# Patient Record
Sex: FEMALE | Race: WHITE | NOT HISPANIC OR LATINO | Employment: FULL TIME | URBAN - NONMETROPOLITAN AREA
[De-identification: names, ages, dates, MRNs, and addresses within clinical notes are randomized per-mention and may not be internally consistent; named-entity substitution may affect disease eponyms.]

---

## 2020-10-18 ENCOUNTER — APPOINTMENT (EMERGENCY)
Dept: RADIOLOGY | Facility: HOSPITAL | Age: 50
End: 2020-10-18
Payer: COMMERCIAL

## 2020-10-18 ENCOUNTER — HOSPITAL ENCOUNTER (OUTPATIENT)
Facility: HOSPITAL | Age: 50
Setting detail: OBSERVATION
Discharge: HOME | End: 2020-10-19
Attending: EMERGENCY MEDICINE | Admitting: INTERNAL MEDICINE
Payer: COMMERCIAL

## 2020-10-18 DIAGNOSIS — R79.89 ELEVATED TSH: ICD-10-CM

## 2020-10-18 DIAGNOSIS — R07.9 CHEST PAIN: Primary | ICD-10-CM

## 2020-10-18 DIAGNOSIS — I16.0 HYPERTENSIVE URGENCY: ICD-10-CM

## 2020-10-18 DIAGNOSIS — R79.89 ABNORMAL SERUM THYROID STIMULATING HORMONE (TSH) LEVEL: ICD-10-CM

## 2020-10-18 PROBLEM — R07.2 PRECORDIAL CHEST PAIN: Status: ACTIVE | Noted: 2020-10-18

## 2020-10-18 LAB
ALBUMIN SERPL BCP-MCNC: 3.5 G/DL (ref 3.5–5)
ALP SERPL-CCNC: 80 U/L (ref 46–116)
ALT SERPL W P-5'-P-CCNC: 25 U/L (ref 12–78)
ANION GAP SERPL CALCULATED.3IONS-SCNC: 8 MMOL/L (ref 4–13)
AST SERPL W P-5'-P-CCNC: 21 U/L (ref 5–45)
BASOPHILS # BLD AUTO: 0.03 THOUSANDS/ΜL (ref 0–0.1)
BASOPHILS NFR BLD AUTO: 1 % (ref 0–1)
BILIRUB SERPL-MCNC: 0.42 MG/DL (ref 0.2–1)
BUN SERPL-MCNC: 10 MG/DL (ref 5–25)
CALCIUM SERPL-MCNC: 8.3 MG/DL (ref 8.3–10.1)
CHLORIDE SERPL-SCNC: 104 MMOL/L (ref 100–108)
CHOLEST SERPL-MCNC: 214 MG/DL (ref 50–200)
CK SERPL-CCNC: 104 U/L (ref 26–192)
CO2 SERPL-SCNC: 27 MMOL/L (ref 21–32)
CREAT SERPL-MCNC: 0.67 MG/DL (ref 0.6–1.3)
EOSINOPHIL # BLD AUTO: 0.2 THOUSAND/ΜL (ref 0–0.61)
EOSINOPHIL NFR BLD AUTO: 3 % (ref 0–6)
ERYTHROCYTE [DISTWIDTH] IN BLOOD BY AUTOMATED COUNT: 12.5 % (ref 11.6–15.1)
EST. AVERAGE GLUCOSE BLD GHB EST-MCNC: 114 MG/DL
GFR SERPL CREATININE-BSD FRML MDRD: 104 ML/MIN/1.73SQ M
GLUCOSE SERPL-MCNC: 104 MG/DL (ref 65–140)
HBA1C MFR BLD: 5.6 %
HCG SERPL QL: NEGATIVE
HCT VFR BLD AUTO: 40.8 % (ref 34.8–46.1)
HDLC SERPL-MCNC: 47 MG/DL
HGB BLD-MCNC: 13.6 G/DL (ref 11.5–15.4)
IMM GRANULOCYTES # BLD AUTO: 0.01 THOUSAND/UL (ref 0–0.2)
IMM GRANULOCYTES NFR BLD AUTO: 0 % (ref 0–2)
INR PPP: 0.94 (ref 0.84–1.19)
LDLC SERPL CALC-MCNC: 143 MG/DL (ref 0–100)
LIPASE SERPL-CCNC: 57 U/L (ref 73–393)
LYMPHOCYTES # BLD AUTO: 1.68 THOUSANDS/ΜL (ref 0.6–4.47)
LYMPHOCYTES NFR BLD AUTO: 28 % (ref 14–44)
MAGNESIUM SERPL-MCNC: 1.9 MG/DL (ref 1.6–2.6)
MCH RBC QN AUTO: 30.6 PG (ref 26.8–34.3)
MCHC RBC AUTO-ENTMCNC: 33.3 G/DL (ref 31.4–37.4)
MCV RBC AUTO: 92 FL (ref 82–98)
MONOCYTES # BLD AUTO: 0.37 THOUSAND/ΜL (ref 0.17–1.22)
MONOCYTES NFR BLD AUTO: 6 % (ref 4–12)
NEUTROPHILS # BLD AUTO: 3.65 THOUSANDS/ΜL (ref 1.85–7.62)
NEUTS SEG NFR BLD AUTO: 62 % (ref 43–75)
NONHDLC SERPL-MCNC: 167 MG/DL
NRBC BLD AUTO-RTO: 0 /100 WBCS
PLATELET # BLD AUTO: 290 THOUSANDS/UL (ref 149–390)
PMV BLD AUTO: 8.9 FL (ref 8.9–12.7)
POTASSIUM SERPL-SCNC: 3.5 MMOL/L (ref 3.5–5.3)
PROT SERPL-MCNC: 7.6 G/DL (ref 6.4–8.2)
PROTHROMBIN TIME: 12.4 SECONDS (ref 11.6–14.5)
RBC # BLD AUTO: 4.44 MILLION/UL (ref 3.81–5.12)
SODIUM SERPL-SCNC: 139 MMOL/L (ref 136–145)
TRIGL SERPL-MCNC: 121 MG/DL
TROPONIN I SERPL-MCNC: <0.02 NG/ML
TSH SERPL DL<=0.05 MIU/L-ACNC: 18.61 UIU/ML (ref 0.36–3.74)
WBC # BLD AUTO: 5.94 THOUSAND/UL (ref 4.31–10.16)

## 2020-10-18 PROCEDURE — 83690 ASSAY OF LIPASE: CPT | Performed by: EMERGENCY MEDICINE

## 2020-10-18 PROCEDURE — 85610 PROTHROMBIN TIME: CPT | Performed by: EMERGENCY MEDICINE

## 2020-10-18 PROCEDURE — 84484 ASSAY OF TROPONIN QUANT: CPT | Performed by: FAMILY MEDICINE

## 2020-10-18 PROCEDURE — 85025 COMPLETE CBC W/AUTO DIFF WBC: CPT | Performed by: EMERGENCY MEDICINE

## 2020-10-18 PROCEDURE — 71045 X-RAY EXAM CHEST 1 VIEW: CPT

## 2020-10-18 PROCEDURE — 83735 ASSAY OF MAGNESIUM: CPT | Performed by: EMERGENCY MEDICINE

## 2020-10-18 PROCEDURE — 36415 COLL VENOUS BLD VENIPUNCTURE: CPT | Performed by: EMERGENCY MEDICINE

## 2020-10-18 PROCEDURE — 96360 HYDRATION IV INFUSION INIT: CPT

## 2020-10-18 PROCEDURE — 80053 COMPREHEN METABOLIC PANEL: CPT | Performed by: EMERGENCY MEDICINE

## 2020-10-18 PROCEDURE — 99285 EMERGENCY DEPT VISIT HI MDM: CPT | Performed by: EMERGENCY MEDICINE

## 2020-10-18 PROCEDURE — 99220 PR INITIAL OBSERVATION CARE/DAY 70 MINUTES: CPT | Performed by: FAMILY MEDICINE

## 2020-10-18 PROCEDURE — 84703 CHORIONIC GONADOTROPIN ASSAY: CPT | Performed by: EMERGENCY MEDICINE

## 2020-10-18 PROCEDURE — 82550 ASSAY OF CK (CPK): CPT | Performed by: EMERGENCY MEDICINE

## 2020-10-18 PROCEDURE — 99285 EMERGENCY DEPT VISIT HI MDM: CPT

## 2020-10-18 PROCEDURE — 80061 LIPID PANEL: CPT | Performed by: FAMILY MEDICINE

## 2020-10-18 PROCEDURE — 94760 N-INVAS EAR/PLS OXIMETRY 1: CPT

## 2020-10-18 PROCEDURE — 84484 ASSAY OF TROPONIN QUANT: CPT | Performed by: EMERGENCY MEDICINE

## 2020-10-18 PROCEDURE — 93005 ELECTROCARDIOGRAM TRACING: CPT

## 2020-10-18 PROCEDURE — 83036 HEMOGLOBIN GLYCOSYLATED A1C: CPT | Performed by: FAMILY MEDICINE

## 2020-10-18 PROCEDURE — 84443 ASSAY THYROID STIM HORMONE: CPT | Performed by: EMERGENCY MEDICINE

## 2020-10-18 RX ORDER — ATORVASTATIN CALCIUM 40 MG/1
40 TABLET, FILM COATED ORAL EVERY EVENING
Status: DISCONTINUED | OUTPATIENT
Start: 2020-10-18 | End: 2020-10-19 | Stop reason: HOSPADM

## 2020-10-18 RX ORDER — ACETAMINOPHEN 325 MG/1
650 TABLET ORAL EVERY 6 HOURS PRN
Status: DISCONTINUED | OUTPATIENT
Start: 2020-10-18 | End: 2020-10-19 | Stop reason: HOSPADM

## 2020-10-18 RX ORDER — KETOROLAC TROMETHAMINE 30 MG/ML
15 INJECTION, SOLUTION INTRAMUSCULAR; INTRAVENOUS ONCE
Status: COMPLETED | OUTPATIENT
Start: 2020-10-18 | End: 2020-10-18

## 2020-10-18 RX ORDER — ASPIRIN 81 MG/1
324 TABLET, CHEWABLE ORAL ONCE
Status: COMPLETED | OUTPATIENT
Start: 2020-10-18 | End: 2020-10-18

## 2020-10-18 RX ORDER — DOCUSATE SODIUM 100 MG/1
100 CAPSULE, LIQUID FILLED ORAL 2 TIMES DAILY
Status: DISCONTINUED | OUTPATIENT
Start: 2020-10-18 | End: 2020-10-19 | Stop reason: HOSPADM

## 2020-10-18 RX ORDER — ONDANSETRON 2 MG/ML
4 INJECTION INTRAMUSCULAR; INTRAVENOUS EVERY 6 HOURS PRN
Status: DISCONTINUED | OUTPATIENT
Start: 2020-10-18 | End: 2020-10-19 | Stop reason: HOSPADM

## 2020-10-18 RX ORDER — SODIUM CHLORIDE 9 MG/ML
3 INJECTION INTRAVENOUS
Status: DISCONTINUED | OUTPATIENT
Start: 2020-10-18 | End: 2020-10-19 | Stop reason: HOSPADM

## 2020-10-18 RX ORDER — POLYETHYLENE GLYCOL 3350 17 G/17G
17 POWDER, FOR SOLUTION ORAL DAILY
Status: DISCONTINUED | OUTPATIENT
Start: 2020-10-18 | End: 2020-10-19 | Stop reason: HOSPADM

## 2020-10-18 RX ORDER — NITROGLYCERIN 0.4 MG/1
0.4 TABLET SUBLINGUAL
Status: DISCONTINUED | OUTPATIENT
Start: 2020-10-18 | End: 2020-10-19 | Stop reason: HOSPADM

## 2020-10-18 RX ORDER — ASPIRIN 81 MG/1
81 TABLET ORAL DAILY
Status: DISCONTINUED | OUTPATIENT
Start: 2020-10-19 | End: 2020-10-19 | Stop reason: HOSPADM

## 2020-10-18 RX ORDER — LISINOPRIL 5 MG/1
5 TABLET ORAL DAILY
Status: DISCONTINUED | OUTPATIENT
Start: 2020-10-18 | End: 2020-10-19 | Stop reason: HOSPADM

## 2020-10-18 RX ORDER — MAGNESIUM HYDROXIDE/ALUMINUM HYDROXICE/SIMETHICONE 120; 1200; 1200 MG/30ML; MG/30ML; MG/30ML
30 SUSPENSION ORAL EVERY 6 HOURS PRN
Status: DISCONTINUED | OUTPATIENT
Start: 2020-10-18 | End: 2020-10-19 | Stop reason: HOSPADM

## 2020-10-18 RX ORDER — NITROGLYCERIN 0.4 MG/1
0.4 TABLET SUBLINGUAL ONCE
Status: COMPLETED | OUTPATIENT
Start: 2020-10-18 | End: 2020-10-18

## 2020-10-18 RX ADMIN — ASPIRIN 81 MG 324 MG: 81 TABLET ORAL at 09:28

## 2020-10-18 RX ADMIN — SODIUM CHLORIDE 500 ML: 0.9 INJECTION, SOLUTION INTRAVENOUS at 09:32

## 2020-10-18 RX ADMIN — NITROGLYCERIN 0.4 MG: 0.4 TABLET SUBLINGUAL at 09:37

## 2020-10-18 RX ADMIN — ATORVASTATIN CALCIUM 40 MG: 40 TABLET, FILM COATED ORAL at 17:05

## 2020-10-18 RX ADMIN — KETOROLAC TROMETHAMINE 15 MG: 30 INJECTION, SOLUTION INTRAMUSCULAR at 22:16

## 2020-10-18 RX ADMIN — ENOXAPARIN SODIUM 40 MG: 40 INJECTION SUBCUTANEOUS at 12:20

## 2020-10-18 RX ADMIN — DOCUSATE SODIUM 100 MG: 100 CAPSULE, LIQUID FILLED ORAL at 17:05

## 2020-10-18 RX ADMIN — Medication 400 MG: at 17:05

## 2020-10-18 RX ADMIN — NITROGLYCERIN 0.4 MG: 0.4 TABLET SUBLINGUAL at 09:29

## 2020-10-18 RX ADMIN — Medication 400 MG: at 13:38

## 2020-10-18 RX ADMIN — LISINOPRIL 5 MG: 10 TABLET ORAL at 12:18

## 2020-10-19 ENCOUNTER — APPOINTMENT (OUTPATIENT)
Dept: NUCLEAR MEDICINE | Facility: HOSPITAL | Age: 50
End: 2020-10-19
Payer: COMMERCIAL

## 2020-10-19 ENCOUNTER — APPOINTMENT (OUTPATIENT)
Dept: NON INVASIVE DIAGNOSTICS | Facility: HOSPITAL | Age: 50
End: 2020-10-19
Payer: COMMERCIAL

## 2020-10-19 VITALS
WEIGHT: 191.8 LBS | DIASTOLIC BLOOD PRESSURE: 77 MMHG | OXYGEN SATURATION: 97 % | BODY MASS INDEX: 32.74 KG/M2 | TEMPERATURE: 98.1 F | HEART RATE: 62 BPM | SYSTOLIC BLOOD PRESSURE: 120 MMHG | RESPIRATION RATE: 17 BRPM | HEIGHT: 64 IN

## 2020-10-19 LAB
ATRIAL RATE: 61 BPM
ATRIAL RATE: 69 BPM
P AXIS: 55 DEGREES
P AXIS: 59 DEGREES
PR INTERVAL: 168 MS
PR INTERVAL: 178 MS
QRS AXIS: -6 DEGREES
QRS AXIS: 12 DEGREES
QRSD INTERVAL: 76 MS
QRSD INTERVAL: 82 MS
QT INTERVAL: 428 MS
QT INTERVAL: 440 MS
QTC INTERVAL: 442 MS
QTC INTERVAL: 458 MS
T WAVE AXIS: 12 DEGREES
T WAVE AXIS: 17 DEGREES
T4 FREE SERPL-MCNC: 0.59 NG/DL (ref 0.76–1.46)
TSH SERPL DL<=0.05 MIU/L-ACNC: 21.91 UIU/ML (ref 0.36–3.74)
VENTRICULAR RATE: 61 BPM
VENTRICULAR RATE: 69 BPM

## 2020-10-19 PROCEDURE — G1004 CDSM NDSC: HCPCS

## 2020-10-19 PROCEDURE — 99217 PR OBSERVATION CARE DISCHARGE MANAGEMENT: CPT | Performed by: INTERNAL MEDICINE

## 2020-10-19 PROCEDURE — 84443 ASSAY THYROID STIM HORMONE: CPT | Performed by: FAMILY MEDICINE

## 2020-10-19 PROCEDURE — 93017 CV STRESS TEST TRACING ONLY: CPT

## 2020-10-19 PROCEDURE — 78452 HT MUSCLE IMAGE SPECT MULT: CPT

## 2020-10-19 PROCEDURE — A9502 TC99M TETROFOSMIN: HCPCS

## 2020-10-19 PROCEDURE — 84439 ASSAY OF FREE THYROXINE: CPT | Performed by: FAMILY MEDICINE

## 2020-10-19 RX ORDER — LISINOPRIL 5 MG/1
5 TABLET ORAL DAILY
Qty: 30 TABLET | Refills: 0 | Status: SHIPPED | OUTPATIENT
Start: 2020-10-20 | End: 2021-10-22

## 2020-10-19 RX ORDER — LEVOTHYROXINE SODIUM 0.05 MG/1
50 TABLET ORAL DAILY
Qty: 30 TABLET | Refills: 0 | Status: SHIPPED | OUTPATIENT
Start: 2020-10-19

## 2020-10-19 RX ADMIN — Medication 400 MG: at 09:01

## 2020-10-19 RX ADMIN — POLYETHYLENE GLYCOL 3350 17 G: 17 POWDER, FOR SOLUTION ORAL at 09:03

## 2020-10-19 RX ADMIN — REGADENOSON 0.4 MG: 0.08 INJECTION, SOLUTION INTRAVENOUS at 12:31

## 2020-10-19 RX ADMIN — LISINOPRIL 5 MG: 10 TABLET ORAL at 09:01

## 2020-10-19 RX ADMIN — DOCUSATE SODIUM 100 MG: 100 CAPSULE, LIQUID FILLED ORAL at 09:01

## 2020-10-19 RX ADMIN — ENOXAPARIN SODIUM 40 MG: 40 INJECTION SUBCUTANEOUS at 09:00

## 2020-10-19 RX ADMIN — ASPIRIN 81 MG: 81 TABLET, COATED ORAL at 09:01

## 2020-10-20 LAB
ARRHY DURING EX: NORMAL
CHEST PAIN STATEMENT: NORMAL
MAX DIASTOLIC BP: 110 MMHG
MAX HEART RATE: 125 BPM
MAX PREDICTED HEART RATE: 171 BPM
MAX. SYSTOLIC BP: 197 MMHG
PROTOCOL NAME: NORMAL
REASON FOR TERMINATION: NORMAL
TARGET HR FORMULA: NORMAL
TEST INDICATION: NORMAL
TIME IN EXERCISE PHASE: NORMAL

## 2021-10-22 ENCOUNTER — APPOINTMENT (EMERGENCY)
Dept: RADIOLOGY | Facility: HOSPITAL | Age: 51
End: 2021-10-22
Payer: COMMERCIAL

## 2021-10-22 ENCOUNTER — HOSPITAL ENCOUNTER (EMERGENCY)
Facility: HOSPITAL | Age: 51
Discharge: HOME | End: 2021-10-22
Attending: EMERGENCY MEDICINE | Admitting: EMERGENCY MEDICINE
Payer: COMMERCIAL

## 2021-10-22 VITALS
WEIGHT: 177 LBS | OXYGEN SATURATION: 100 % | HEIGHT: 64 IN | SYSTOLIC BLOOD PRESSURE: 163 MMHG | BODY MASS INDEX: 30.22 KG/M2 | RESPIRATION RATE: 17 BRPM | DIASTOLIC BLOOD PRESSURE: 79 MMHG | HEART RATE: 59 BPM | TEMPERATURE: 98.5 F

## 2021-10-22 DIAGNOSIS — R07.9 CHEST PAIN: Primary | ICD-10-CM

## 2021-10-22 LAB
ANION GAP SERPL CALCULATED.3IONS-SCNC: 7 MMOL/L (ref 4–13)
BASOPHILS # BLD AUTO: 0.04 THOUSANDS/ΜL (ref 0–0.1)
BASOPHILS NFR BLD AUTO: 1 % (ref 0–1)
BUN SERPL-MCNC: 19 MG/DL (ref 5–25)
CALCIUM SERPL-MCNC: 8.9 MG/DL (ref 8.3–10.1)
CHLORIDE SERPL-SCNC: 103 MMOL/L (ref 100–108)
CO2 SERPL-SCNC: 29 MMOL/L (ref 21–32)
CREAT SERPL-MCNC: 0.71 MG/DL (ref 0.6–1.3)
D DIMER PPP FEU-MCNC: 0.38 UG/ML FEU
EOSINOPHIL # BLD AUTO: 0.22 THOUSAND/ΜL (ref 0–0.61)
EOSINOPHIL NFR BLD AUTO: 3 % (ref 0–6)
ERYTHROCYTE [DISTWIDTH] IN BLOOD BY AUTOMATED COUNT: 12.2 % (ref 11.6–15.1)
GFR SERPL CREATININE-BSD FRML MDRD: 100 ML/MIN/1.73SQ M
GLUCOSE SERPL-MCNC: 96 MG/DL (ref 65–140)
HCT VFR BLD AUTO: 39.6 % (ref 34.8–46.1)
HGB BLD-MCNC: 13.7 G/DL (ref 11.5–15.4)
IMM GRANULOCYTES # BLD AUTO: 0.02 THOUSAND/UL (ref 0–0.2)
IMM GRANULOCYTES NFR BLD AUTO: 0 % (ref 0–2)
LYMPHOCYTES # BLD AUTO: 1.94 THOUSANDS/ΜL (ref 0.6–4.47)
LYMPHOCYTES NFR BLD AUTO: 28 % (ref 14–44)
MCH RBC QN AUTO: 31.5 PG (ref 26.8–34.3)
MCHC RBC AUTO-ENTMCNC: 34.6 G/DL (ref 31.4–37.4)
MCV RBC AUTO: 91 FL (ref 82–98)
MONOCYTES # BLD AUTO: 0.53 THOUSAND/ΜL (ref 0.17–1.22)
MONOCYTES NFR BLD AUTO: 8 % (ref 4–12)
NEUTROPHILS # BLD AUTO: 4.21 THOUSANDS/ΜL (ref 1.85–7.62)
NEUTS SEG NFR BLD AUTO: 60 % (ref 43–75)
NRBC BLD AUTO-RTO: 0 /100 WBCS
NT-PROBNP SERPL-MCNC: 303 PG/ML
PLATELET # BLD AUTO: 290 THOUSANDS/UL (ref 149–390)
PMV BLD AUTO: 9.2 FL (ref 8.9–12.7)
POTASSIUM SERPL-SCNC: 3.9 MMOL/L (ref 3.5–5.3)
RBC # BLD AUTO: 4.35 MILLION/UL (ref 3.81–5.12)
SODIUM SERPL-SCNC: 139 MMOL/L (ref 136–145)
TROPONIN I SERPL-MCNC: <0.02 NG/ML
TROPONIN I SERPL-MCNC: <0.02 NG/ML
WBC # BLD AUTO: 6.96 THOUSAND/UL (ref 4.31–10.16)

## 2021-10-22 PROCEDURE — 99285 EMERGENCY DEPT VISIT HI MDM: CPT

## 2021-10-22 PROCEDURE — 99285 EMERGENCY DEPT VISIT HI MDM: CPT | Performed by: EMERGENCY MEDICINE

## 2021-10-22 PROCEDURE — 93005 ELECTROCARDIOGRAM TRACING: CPT

## 2021-10-22 PROCEDURE — 36415 COLL VENOUS BLD VENIPUNCTURE: CPT | Performed by: EMERGENCY MEDICINE

## 2021-10-22 PROCEDURE — 85379 FIBRIN DEGRADATION QUANT: CPT | Performed by: EMERGENCY MEDICINE

## 2021-10-22 PROCEDURE — 84484 ASSAY OF TROPONIN QUANT: CPT | Performed by: EMERGENCY MEDICINE

## 2021-10-22 PROCEDURE — 85025 COMPLETE CBC W/AUTO DIFF WBC: CPT | Performed by: EMERGENCY MEDICINE

## 2021-10-22 PROCEDURE — 71046 X-RAY EXAM CHEST 2 VIEWS: CPT

## 2021-10-22 PROCEDURE — 80048 BASIC METABOLIC PNL TOTAL CA: CPT | Performed by: EMERGENCY MEDICINE

## 2021-10-22 PROCEDURE — 83880 ASSAY OF NATRIURETIC PEPTIDE: CPT | Performed by: EMERGENCY MEDICINE

## 2021-10-23 LAB
ATRIAL RATE: 70 BPM
P AXIS: 68 DEGREES
PR INTERVAL: 164 MS
QRS AXIS: -12 DEGREES
QRSD INTERVAL: 88 MS
QT INTERVAL: 432 MS
QTC INTERVAL: 466 MS
T WAVE AXIS: 52 DEGREES
VENTRICULAR RATE: 70 BPM

## 2021-12-14 ENCOUNTER — APPOINTMENT (EMERGENCY)
Dept: RADIOLOGY | Facility: HOSPITAL | Age: 51
End: 2021-12-14
Payer: COMMERCIAL

## 2021-12-14 ENCOUNTER — HOSPITAL ENCOUNTER (EMERGENCY)
Facility: HOSPITAL | Age: 51
Discharge: HOME | End: 2021-12-14
Attending: STUDENT IN AN ORGANIZED HEALTH CARE EDUCATION/TRAINING PROGRAM
Payer: COMMERCIAL

## 2021-12-14 VITALS
WEIGHT: 180 LBS | OXYGEN SATURATION: 99 % | HEART RATE: 77 BPM | RESPIRATION RATE: 18 BRPM | HEIGHT: 64 IN | BODY MASS INDEX: 30.73 KG/M2 | SYSTOLIC BLOOD PRESSURE: 205 MMHG | DIASTOLIC BLOOD PRESSURE: 99 MMHG | TEMPERATURE: 98.8 F

## 2021-12-14 DIAGNOSIS — S92.412A: Primary | ICD-10-CM

## 2021-12-14 PROCEDURE — 73630 X-RAY EXAM OF FOOT: CPT

## 2021-12-14 PROCEDURE — 99284 EMERGENCY DEPT VISIT MOD MDM: CPT | Performed by: STUDENT IN AN ORGANIZED HEALTH CARE EDUCATION/TRAINING PROGRAM

## 2021-12-14 PROCEDURE — 99283 EMERGENCY DEPT VISIT LOW MDM: CPT

## 2021-12-14 RX ORDER — OXYCODONE HYDROCHLORIDE 5 MG/1
5 TABLET ORAL EVERY 6 HOURS PRN
Qty: 6 TABLET | Refills: 0 | Status: SHIPPED | OUTPATIENT
Start: 2021-12-14

## 2021-12-14 RX ORDER — IBUPROFEN 600 MG/1
600 TABLET ORAL ONCE
Status: COMPLETED | OUTPATIENT
Start: 2021-12-14 | End: 2021-12-14

## 2021-12-14 RX ADMIN — IBUPROFEN 600 MG: 600 TABLET ORAL at 02:08

## 2022-06-23 DIAGNOSIS — M77.8 OTHER ENTHESOPATHIES, NOT ELSEWHERE CLASSIFIED: ICD-10-CM

## 2022-06-23 DIAGNOSIS — M67.879 OTHER SPECIFIED DISORDERS OF SYNOVIUM AND TENDON, UNSPECIFIED ANKLE AND FOOT: ICD-10-CM

## 2022-06-23 DIAGNOSIS — M21.40 FLAT FOOT (PES PLANUS) (ACQUIRED), UNSPECIFIED FOOT: ICD-10-CM

## 2022-10-28 ENCOUNTER — APPOINTMENT (EMERGENCY)
Dept: RADIOLOGY | Facility: HOSPITAL | Age: 52
End: 2022-10-28
Payer: COMMERCIAL

## 2022-10-28 ENCOUNTER — HOSPITAL ENCOUNTER (EMERGENCY)
Facility: HOSPITAL | Age: 52
Discharge: HOME | End: 2022-10-28
Attending: EMERGENCY MEDICINE
Payer: COMMERCIAL

## 2022-10-28 VITALS
RESPIRATION RATE: 18 BRPM | TEMPERATURE: 97.6 F | WEIGHT: 180 LBS | DIASTOLIC BLOOD PRESSURE: 120 MMHG | HEIGHT: 64 IN | OXYGEN SATURATION: 98 % | SYSTOLIC BLOOD PRESSURE: 174 MMHG | HEART RATE: 82 BPM | BODY MASS INDEX: 30.73 KG/M2

## 2022-10-28 DIAGNOSIS — R68.89 FLU-LIKE SYMPTOMS: Primary | ICD-10-CM

## 2022-10-28 DIAGNOSIS — J02.9 PHARYNGITIS: ICD-10-CM

## 2022-10-28 LAB
FLUAV RNA RESP QL NAA+PROBE: NEGATIVE
FLUBV RNA RESP QL NAA+PROBE: NEGATIVE
RSV RNA RESP QL NAA+PROBE: NEGATIVE
S PYO DNA THROAT QL NAA+PROBE: NOT DETECTED
SARS-COV-2 RNA RESP QL NAA+PROBE: NEGATIVE

## 2022-10-28 PROCEDURE — 0241U HB NFCT DS VIR RESP RNA 4 TRGT: CPT | Performed by: PHYSICIAN ASSISTANT

## 2022-10-28 PROCEDURE — 71045 X-RAY EXAM CHEST 1 VIEW: CPT

## 2022-10-28 PROCEDURE — 87651 STREP A DNA AMP PROBE: CPT | Performed by: PHYSICIAN ASSISTANT

## 2022-10-28 RX ORDER — KETOROLAC TROMETHAMINE 30 MG/ML
15 INJECTION, SOLUTION INTRAMUSCULAR; INTRAVENOUS ONCE
Status: COMPLETED | OUTPATIENT
Start: 2022-10-28 | End: 2022-10-28

## 2022-10-28 RX ADMIN — KETOROLAC TROMETHAMINE 15 MG: 30 INJECTION, SOLUTION INTRAMUSCULAR at 13:47

## 2022-10-28 NOTE — ED PROVIDER NOTES
History  Chief Complaint   Patient presents with   • Flu Symptoms     Pt reports sore throat with pain with swallowing and body aches x 3 days  Pt now reporting headache  Pt last took aleve 9pm last night  61-year-old female presents the emergency department for evaluation of flu-like symptoms onset yesterday 3 days ago  Patient reports she has a nonproductive cough, sore throat, body aches  Reports chills low-grade fever subjectively last night which resolved with Tylenol  Patient admits to mild headache  She denies any nausea or vomiting  Denies abdominal pain  Denies chest pain, shortness of breath on insulin  History provided by:  Patient  Flu Symptoms  Presenting symptoms: cough, fatigue, fever, headache, myalgias and sore throat    Presenting symptoms: no diarrhea, no nausea, no rhinorrhea, no shortness of breath and no vomiting    Cough:     Cough characteristics:  Non-productive  Fatigue:     Severity:  Mild  Fever:     Temp source:  Subjective    Progression:  Resolved  Headaches:     Severity:  Mild    Onset quality:  Gradual    Timing:  Constant    Progression:  Unchanged    Chronicity:  New  Severity:  Mild  Onset quality:  Gradual  Chronicity:  New  Associated symptoms: chills    Associated symptoms: no decreased appetite, no decrease in physical activity, no ear pain, no mental status change, no congestion, no neck stiffness and no syncope        Prior to Admission Medications   Prescriptions Last Dose Informant Patient Reported?  Taking?   levothyroxine 50 mcg tablet   No No   Sig: Take 1 tablet (50 mcg total) by mouth daily   Patient taking differently: Take 75 mcg by mouth daily    oxyCODONE (Roxicodone) 5 immediate release tablet   No No   Sig: Take 1 tablet (5 mg total) by mouth every 6 (six) hours as needed for moderate pain or severe pain Max Daily Amount: 20 mg      Facility-Administered Medications: None       Past Medical History:   Diagnosis Date   • Coronary artery disease • Disease of thyroid gland    • Hypertension    • MI (myocardial infarction) [facility])        Past Surgical History:   Procedure Laterality Date   • HYSTERECTOMY         History reviewed  No pertinent family history  I have reviewed and agree with the history as documented  E-Cigarette/Vaping   • E-Cigarette Use Never User      E-Cigarette/Vaping Substances     Social History     Tobacco Use   • Smoking status: Never Smoker   • Smokeless tobacco: Never Used   Vaping Use   • Vaping Use: Never used   Substance Use Topics   • Alcohol use: Never   • Drug use: Never       Review of Systems   Constitutional: Positive for chills, fatigue and fever  Negative for appetite change, decreased appetite and diaphoresis  HENT: Positive for sore throat  Negative for congestion, ear pain and rhinorrhea  Respiratory: Positive for cough  Negative for choking, chest tightness, shortness of breath, wheezing and stridor  Cardiovascular: Negative for chest pain, palpitations and leg swelling  Gastrointestinal: Negative  Negative for diarrhea, nausea and vomiting  Musculoskeletal: Positive for myalgias  Negative for back pain, gait problem, joint swelling, neck pain and neck stiffness  Skin: Negative  Neurological: Positive for headaches  Negative for dizziness, tremors, syncope, facial asymmetry, speech difficulty, weakness and light-headedness  All other systems reviewed and are negative  Physical Exam  Physical Exam  Vitals and nursing note reviewed  Constitutional:       General: She is not in acute distress  Appearance: Normal appearance  She is normal weight  She is not ill-appearing, toxic-appearing or diaphoretic  HENT:      Head: Normocephalic  Right Ear: Tympanic membrane, ear canal and external ear normal       Left Ear: Tympanic membrane, ear canal and external ear normal       Nose: Nose normal       Mouth/Throat:      Mouth: Mucous membranes are moist       Pharynx: Oropharynx is clear  Posterior oropharyngeal erythema present  Eyes:      Conjunctiva/sclera: Conjunctivae normal       Pupils: Pupils are equal, round, and reactive to light  Cardiovascular:      Rate and Rhythm: Normal rate and regular rhythm  Pulmonary:      Effort: Pulmonary effort is normal  No respiratory distress  Breath sounds: Normal breath sounds  No stridor  No wheezing, rhonchi or rales  Chest:      Chest wall: No tenderness  Abdominal:      General: Abdomen is flat  Bowel sounds are normal  There is no distension  Palpations: Abdomen is soft  Tenderness: There is no abdominal tenderness  There is no guarding  Musculoskeletal:         General: Normal range of motion  Cervical back: Normal range of motion  No tenderness  Lymphadenopathy:      Cervical: No cervical adenopathy  Skin:     General: Skin is warm and dry  Capillary Refill: Capillary refill takes less than 2 seconds  Findings: No bruising, erythema or rash  Neurological:      General: No focal deficit present  Mental Status: She is alert and oriented to person, place, and time     Psychiatric:         Mood and Affect: Mood normal          Behavior: Behavior normal          Vital Signs  ED Triage Vitals [10/28/22 1255]   Temperature Pulse Respirations Blood Pressure SpO2   97 6 °F (36 4 °C) 82 18 (!) 174/120 98 %      Temp src Heart Rate Source Patient Position - Orthostatic VS BP Location FiO2 (%)   -- -- -- -- --      Pain Score       6           Vitals:    10/28/22 1255   BP: (!) 174/120   Pulse: 82         Visual Acuity      ED Medications  Medications   ketorolac (TORADOL) injection 15 mg (15 mg Intramuscular Given 10/28/22 1347)       Diagnostic Studies  Results Reviewed     Procedure Component Value Units Date/Time    FLU/RSV/COVID - if FLU/RSV clinically relevant [[id number]]  (Normal) Collected: 10/28/22 1347    Lab Status: Final result Specimen: Nares from Nasopharyngeal Swab Updated: 10/28/22 7637 SARS-CoV-2 Negative     INFLUENZA A PCR Negative     INFLUENZA B PCR Negative     RSV PCR Negative    Narrative:      FOR PEDIATRIC PATIENTS - copy/paste COVID Guidelines URL to browser: [email or web address] [email or web address]  ashx    SARS-CoV-2 assay is a Nucleic Acid Amplification assay intended for the  qualitative detection of nucleic acid from SARS-CoV-2 in nasopharyngeal  swabs  Results are for the presumptive identification of SARS-CoV-2 RNA  Positive results are indicative of infection with SARS-CoV-2, the virus  causing COVID-19, but do not rule out bacterial infection or co-infection  with other viruses  Laboratories within the [place] and its  territories are required to report all positive results to the appropriate  public health authorities  Negative results do not preclude SARS-CoV-2  infection and should not be used as the sole basis for treatment or other  patient management decisions  Negative results must be combined with  clinical observations, patient history, and epidemiological information  This test has not been FDA cleared or approved  This test has been authorized by FDA under an Emergency Use Authorization  (EUA)  This test is only authorized for the duration of time the  declaration that circumstances exist justifying the authorization of the  emergency use of an in vitro diagnostic tests for detection of SARS-CoV-2  virus and/or diagnosis of COVID-19 infection under section 564(b)(1) of  the Act, 21 U  S C  811DOS-6(W)(7), unless the authorization is terminated  or revoked sooner  The test has been validated but independent review by FDA  and CLIA is pending  Test performed using [employer] Gene[employer]pert: This RT-PCR assay targets N2,  a region unique to SARS-CoV-2  A conserved region in the E-gene was chosen  for pan-Sarbecovirus detection which includes SARS-CoV-2      According to CMS-2020-01-R, this platform meets the definition of high-throughput technology  Strep A PCR [[id number]]  (Normal) Collected: 10/28/22 1347    Lab Status: Final result Specimen: Throat Updated: 10/28/22 1418     STREP A PCR Not Detected                 XR chest 1 view portable   ED Interpretation by Antonietta Prader, MD (10/28 1423)   NAD                 Procedures  Procedures         ED Course  ED Course as of 10/28/22 1458   Fri Oct 28, 2022   1420 STREP A PCR: Not Detected                               SBIRT 22yo+    Flowsheet Row Most Recent Value   SBIRT (25 yo +)    In order to provide better care to our patients, we are screening all of our patients for alcohol and drug use  Would it be okay to ask you these screening questions? Yes Filed at: 10/28/2022 1349   Initial Alcohol Screen: US AUDIT-C     1  How often do you have a drink containing alcohol? 0 Filed at: 10/28/2022 1349   2  How many drinks containing alcohol do you have on a typical day you are drinking? 0 Filed at: 10/28/2022 1349   3a  Male UNDER 65: How often do you have five or more drinks on one occasion? 0 Filed at: 10/28/2022 1349   3b  FEMALE Any Age, or MALE 65+: How often do you have 4 or more drinks on one occassion? 0 Filed at: 10/28/2022 1349   Audit-C Score 0 Filed at: 10/28/2022 1349   BILLY: How many times in the past year have you    Used an illegal drug or used a prescription medication for non-medical reasons? Never Filed at: 10/28/2022 1349                    MDM  Number of Diagnoses or Management Options  Flu-like symptoms: new and requires workup  Pharyngitis: new and requires workup  Diagnosis management comments: 47year old female presenting emergency department for evaluation of flu-like symptoms onset 3 days ago  Vitals and medical records reviewed  Patient made aware of elevated blood pressure will follow up with PCP  On exam she had some mild erythema in posterior oropharynx, no tonsillar hypertrophy or exudate  Heart regular rate and rhythm  Lungs clear  Strep negative    ED interpretation chest x-ray negative for acute cardiopulmonary findings  COVID, flu, RSV are pending and I educated patient on symptomatic care, will comment results  These results are negative and patient was made aware  She was clinically and hemodynamically stable for discharge       Amount and/or Complexity of Data Reviewed  Clinical lab tests: ordered and reviewed  Tests in the radiology section of CPT®: ordered and reviewed  Review and summarize past medical records: yes  Independent visualization of images, tracings, or specimens: yes        Disposition  Final diagnoses:   Flu-like symptoms   Pharyngitis     Time reflects when diagnosis was documented in both MDM as applicable and the Disposition within this note     Time User Action Codes Description Comment    10/28/2022  2:08 PM Jorge Dress [R68 89] Flu-like symptoms     10/28/2022  2:21 PM Rosaammon Ernandezjose miguel Add [J02 9] Pharyngitis       ED Disposition     ED Disposition   Discharge    Condition   Stable    Date/Time   Fri Oct 28, 2022  2:08 PM    Comment   Jodi Leila discharge to home/self care  Follow-up Information     Follow up With Specialties Details Why Contact Info    [address], [place] [address] [facility]  [phone]            Discharge Medication List as of 10/28/2022  2:21 PM      CONTINUE these medications which have NOT CHANGED    Details   levothyroxine 50 mcg tablet Take 1 tablet (50 mcg total) by mouth daily, Starting Mon 10/19/2020, Normal      oxyCODONE (Roxicodone) 5 immediate release tablet Take 1 tablet (5 mg total) by mouth every 6 (six) hours as needed for moderate pain or severe pain Max Daily Amount: 20 mg, Starting Tue 12/14/2021, Normal             No discharge procedures on file      PDMP Review       Value Time User    PDMP Reviewed  Yes 10/18/2020 11:24 AM Jordana Rush MD          ED Provider  Electronically Signed by           John Wilson PA-C  10/28/22 7912

## 2022-10-28 NOTE — DISCHARGE INSTRUCTIONS
Please alternate between Tylenol and ibuprofen as needed for body aches and chills  Please get plenty fluids, soft foods and rest  We will call you with the results of your COVID, flu RSV  Please follow-up with your family doctor, your blood pressure was high today and should be re-evaluated    Return with any new or worsening symptoms

## 2023-02-23 ENCOUNTER — OFFICE VISIT (OUTPATIENT)
Dept: URGENT CARE | Facility: CLINIC | Age: 53
End: 2023-02-23

## 2023-02-23 VITALS
HEART RATE: 64 BPM | WEIGHT: 181 LBS | BODY MASS INDEX: 30.9 KG/M2 | TEMPERATURE: 97 F | DIASTOLIC BLOOD PRESSURE: 90 MMHG | OXYGEN SATURATION: 99 % | SYSTOLIC BLOOD PRESSURE: 184 MMHG | RESPIRATION RATE: 18 BRPM | HEIGHT: 64 IN

## 2023-02-23 DIAGNOSIS — R68.89 FLU-LIKE SYMPTOMS: Primary | ICD-10-CM

## 2023-02-23 DIAGNOSIS — F41.9 ANXIETY AND DEPRESSION: ICD-10-CM

## 2023-02-23 DIAGNOSIS — L20.89 OTHER ATOPIC DERMATITIS: ICD-10-CM

## 2023-02-23 DIAGNOSIS — F32.A ANXIETY AND DEPRESSION: ICD-10-CM

## 2023-02-23 RX ORDER — BETAMETHASONE DIPROPIONATE 0.5 MG/ML
LOTION, AUGMENTED TOPICAL 2 TIMES DAILY
Qty: 30 ML | Refills: 0 | Status: SHIPPED | OUTPATIENT
Start: 2023-02-23

## 2023-02-23 NOTE — PROGRESS NOTES
[address] [facility]        NAME: Cassandra Childs is a 47 y o  female  : 1970    MRN: [id number]  DATE: 2023  TIME: 9:43 AM    Assessment and Plan   Flu-like symptoms [R68 89]  1  Flu-like symptoms        2  Anxiety and depression  Ambulatory Referral to Psychiatry      3  Other atopic dermatitis  betamethasone, augmented, (DIPROLENE) 0 05 % lotion        Reinforced the importance of calling 911, suicide hotline, or going to the emergency room she begins having suicidal homicidal ideation  Advised patient to try to follow-up with current PCP or attempt new office  And referral for practice  Patient Instructions   Drink plenty of fluids  May use over the counter cold medications for symptomatic treatment  Do not use medications with Pseudoephedrine or Phenylphrine if you have high blood pressure because it may worsen your blood pressure  Follow up with your PCP in 3-5 days if your symptoms do not improve or if you have any concerns  Go to the ER if symptoms become severe  Follow up with PCP in 3-5 days  Proceed to  ER if symptoms worsen  Chief Complaint     Chief Complaint   Patient presents with   • Cold Like Symptoms     Symptoms started Monday, chills, sinus congestion, fever, fatigue, sore throat, headache, both ears ache, productive cough with dark green and yellow mucus that is getting lighter  Tested for Covid  morning  History of Present Illness       Patient is a 60-year-old female with significant past medical history of hypertension, hypothyroidism, MI, coronary artery disease, and side attempt presents the office complaining of fever 101 2, chills, fatigue, congestion, rhinorrhea, sore throat, cough, earache, nausea, and vomiting for 4 days  Denies chest pain, shortness of breath, or abdominal pain  At home COVID test negative  States she is been having on and off sickness for few months    Reports significant difficulty getting into her PCP for follow-up  Reports she also has eczema on her hands which she has been unable to get rid of with over-the-counter medications  Of note, patient had positive depression screening  Admits she has suicidal thoughts few weeks ago due to finding out that she will likely lose her medical insurance  States she was able to talk to a coworker friend who made her feel better  Denies any active suicidal homicidal thoughts today  States she would not hurt herself because she has 2 grandchildren that she loves very much  Reports she has various abnormal labs according to her PCP including B12, lipids, and vitamin D deficiency but her PCP would not make an appointment for follow-up for 3 months  Review of Systems   Review of Systems   Constitutional: Positive for chills, fatigue and fever (101 )  HENT: Positive for congestion, ear pain and sore throat  Respiratory: Positive for cough  Negative for shortness of breath  Cardiovascular: Negative for chest pain and palpitations  Gastrointestinal: Positive for nausea and vomiting  Negative for abdominal pain and diarrhea  Genitourinary: Positive for flank pain  Negative for dysuria, frequency, hematuria and urgency  Musculoskeletal: Positive for myalgias  Skin: Negative for rash  Neurological: Positive for headaches  Negative for dizziness and light-headedness           Current Medications       Current Outpatient Medications:   •  betamethasone, augmented, (DIPROLENE) 0 05 % lotion, Apply topically 2 (two) times a day, Disp: 30 mL, Rfl: 0  •  levothyroxine 50 mcg tablet, Take 1 tablet (50 mcg total) by mouth daily (Patient taking differently: Take 75 mcg by mouth daily), Disp: 30 tablet, Rfl: 0  •  oxyCODONE (Roxicodone) 5 immediate release tablet, Take 1 tablet (5 mg total) by mouth every 6 (six) hours as needed for moderate pain or severe pain Max Daily Amount: 20 mg (Patient not taking: Reported on 2/23/2023), Disp: 6 tablet, Rfl: 0    Current Allergies     Allergies as of 02/23/2023 - Reviewed 02/23/2023   Allergen Reaction Noted   • Acetaminophen Other (See Comments) 10/22/2021            The following portions of the patient's history were reviewed and updated as appropriate: allergies, current medications, past family history, past medical history, past social history, past surgical history and problem list      Past Medical History:   Diagnosis Date   • Coronary artery disease    • Disease of thyroid gland    • Hypertension    • MI (myocardial infarction) ([facility] [address] )    • Suicide ([facility] [address] )     Attempt to OD on tylenol in 2021       Past Surgical History:   Procedure Laterality Date   • HYSTERECTOMY         History reviewed  No pertinent family history  Medications have been verified  Objective   BP (!) 184/90   Pulse 64   Temp (!) 97 °F (36 1 °C)   Resp 18   Ht 5' 4" (1 626 m)   Wt 82 1 kg (181 lb)   SpO2 99%   BMI 31 07 kg/m²   No LMP recorded  Patient has had a hysterectomy  Physical Exam     Physical Exam  Vitals and nursing note reviewed  Constitutional:       Appearance: Normal appearance  She is well-developed  Comments: Tearful on exam   HENT:      Head: Normocephalic and atraumatic  Right Ear: Tympanic membrane, ear canal and external ear normal       Left Ear: Tympanic membrane, ear canal and external ear normal       Nose: Congestion and rhinorrhea present  Mouth/Throat:      Pharynx: Uvula midline  Eyes:      General: Lids are normal       Conjunctiva/sclera: Conjunctivae normal       Pupils: Pupils are equal, round, and reactive to light  Cardiovascular:      Rate and Rhythm: Normal rate and regular rhythm  Pulses: Normal pulses  Heart sounds: Normal heart sounds  No murmur heard  No friction rub  No gallop  Pulmonary:      Effort: Pulmonary effort is normal       Breath sounds: Normal breath sounds  No wheezing, rhonchi or rales     Abdominal:      General: Bowel sounds are normal  Palpations: Abdomen is soft  Tenderness: There is no abdominal tenderness  Musculoskeletal:         General: Normal range of motion  Cervical back: Neck supple  Lymphadenopathy:      Cervical: No cervical adenopathy  Skin:     General: Skin is warm and dry  Capillary Refill: Capillary refill takes less than 2 seconds  Comments: Eczematic rash on bilateral hands and wrists   Neurological:      Mental Status: She is alert

## 2023-02-23 NOTE — LETTER
February 23, 2023     Patient: Jodi Dee   YOB: 1970   Date of Visit: 2/23/2023       To Whom It May Concern: It is my medical opinion that David Staff should remain out of work until fever free and symptoms improve           Sincerely,        Sheryle Chambers, PA-C

## 2023-02-23 NOTE — LETTER
February 23, 2023     Patient: Jodi Dee   YOB: 1970   Date of Visit: 2/23/2023       To Whom It May Concern: It is my medical opinion that Yamel Vanegas should remain out of work until 2/26/2023             Sincerely,        Adiel Henderson PA-C

## 2023-02-27 ENCOUNTER — TELEPHONE (OUTPATIENT)
Dept: URGENT CARE | Facility: CLINIC | Age: 53
End: 2023-02-27

## 2023-02-27 DIAGNOSIS — L30.9 DERMATITIS: Primary | ICD-10-CM

## 2023-02-27 RX ORDER — TRIAMCINOLONE ACETONIDE 1 MG/G
CREAM TOPICAL 2 TIMES DAILY
Qty: 30 G | Refills: 0 | Status: SHIPPED | OUTPATIENT
Start: 2023-02-27

## 2023-02-27 NOTE — TELEPHONE ENCOUNTER
Patient called stating that she needs a prior authorization for betamethasone prescription  Prior Auth not performed in the urgent care  We will replace betamethasone with triamcinolone

## 2023-03-02 ENCOUNTER — HOSPITAL ENCOUNTER (EMERGENCY)
Facility: HOSPITAL | Age: 53
Discharge: HOME | End: 2023-03-02
Attending: EMERGENCY MEDICINE

## 2023-03-02 VITALS
HEART RATE: 73 BPM | OXYGEN SATURATION: 100 % | WEIGHT: 189.6 LBS | DIASTOLIC BLOOD PRESSURE: 108 MMHG | RESPIRATION RATE: 18 BRPM | HEIGHT: 64 IN | BODY MASS INDEX: 32.37 KG/M2 | TEMPERATURE: 97.6 F | SYSTOLIC BLOOD PRESSURE: 218 MMHG

## 2023-03-02 DIAGNOSIS — L30.9 ECZEMA, UNSPECIFIED TYPE: Primary | ICD-10-CM

## 2023-03-02 RX ORDER — CLOBETASOL PROPIONATE 0.5 MG/G
OINTMENT TOPICAL 2 TIMES DAILY
Qty: 30 G | Refills: 0 | Status: SHIPPED | OUTPATIENT
Start: 2023-03-02

## 2023-03-02 NOTE — Clinical Note
Stefanie Lomas was seen and treated in our emergency department on 3/2/2023  Diagnosis:     Khushbu Lao  may return to work on return date  She may return on this date: 03/06/2023    Please call the ED with any questions you may have  [phone]       If you have any questions or concerns, please don't hesitate to call        Valdemar Aguilar MD    ______________________________           _______________          _______________  Hospital Representative                              Date                                Time

## 2023-03-03 NOTE — ED PROVIDER NOTES
History  Chief Complaint   Patient presents with   • Rash     Pt c/o worsening rash to right hand w/swelling and pain shooting up to shoulder over past 2-3 wks  Pt applying cortisone cream w/o relief  Pt unable to get appt with pcp due to office cancelled  Denies fevers     2 weeks of itchy painful rash to right hand  No rash elsewhere  No fevers  No other complaints  History provided by:  Patient   used: No    Rash  Location: Right hand  Quality: dryness, itchiness, painful, peeling, redness and scaling    Pain details:     Severity:  Moderate    Onset quality:  Gradual    Duration:  2 weeks    Timing:  Constant    Progression:  Unchanged  Severity:  Severe  Onset quality:  Gradual  Duration:  2 weeks  Timing:  Constant  Progression:  Unchanged  Chronicity:  New  Relieved by:  Nothing  Worsened by:  Nothing  Ineffective treatments:  Topical steroids (Has been using hydrocortisone without improvement)  Associated symptoms: no abdominal pain, no diarrhea, no fatigue, no fever, no headaches, no joint pain, no myalgias, no nausea, no shortness of breath, no sore throat, not vomiting and not wheezing        Prior to Admission Medications   Prescriptions Last Dose Informant Patient Reported? Taking?    betamethasone, augmented, (DIPROLENE) 0 05 % lotion   No No   Sig: Apply topically 2 (two) times a day   levothyroxine 50 mcg tablet   No No   Sig: Take 1 tablet (50 mcg total) by mouth daily   Patient taking differently: Take 75 mcg by mouth daily   oxyCODONE (Roxicodone) 5 immediate release tablet   No No   Sig: Take 1 tablet (5 mg total) by mouth every 6 (six) hours as needed for moderate pain or severe pain Max Daily Amount: 20 mg   Patient not taking: Reported on 2/23/2023   triamcinolone (KENALOG) 0 1 % cream   No No   Sig: Apply topically 2 (two) times a day      Facility-Administered Medications: None       Past Medical History:   Diagnosis Date   • Coronary artery disease    • Disease of thyroid gland    • Hypertension    • [facility] infarction) [facility] [address] [facility]    • [facility]     Attempt to OD on tylenol in 2021       Past Surgical History:   Procedure Laterality Date   • HYSTERECTOMY         History reviewed  No pertinent family history  I have reviewed and agree with the history as documented  E-Cigarette/Vaping   • E-Cigarette Use Never User      E-Cigarette/Vaping Substances     Social History     Tobacco Use   • Smoking status: Never   • Smokeless tobacco: Never   Vaping Use   • Vaping Use: Never used   Substance Use Topics   • Alcohol use: Never   • Drug use: Never       Review of Systems   Constitutional: Negative for chills, fatigue and fever  HENT: Negative for ear pain, hearing loss, sore throat, trouble swallowing and voice change  Eyes: Negative for pain and discharge  Respiratory: Negative for cough, shortness of breath and wheezing  Cardiovascular: Negative for chest pain and palpitations  Gastrointestinal: Negative for abdominal pain, blood in stool, constipation, diarrhea, nausea and vomiting  Genitourinary: Negative for dysuria, flank pain, frequency and hematuria  Musculoskeletal: Negative for arthralgias, joint swelling, myalgias, neck pain and neck stiffness  Skin: Positive for rash  Negative for wound  Neurological: Negative for dizziness, seizures, syncope, facial asymmetry and headaches  Psychiatric/Behavioral: Negative for hallucinations, self-injury and suicidal ideas  All other systems reviewed and are negative  Physical Exam  Physical Exam  Vitals and nursing note reviewed  Constitutional:       General: She is not in acute distress  Appearance: She is well-developed  She is not ill-appearing or diaphoretic  HENT:      Head: Normocephalic and atraumatic  Right Ear: External ear normal       Left Ear: External ear normal    Eyes:      General: No scleral icterus  Right eye: No discharge  Left eye: No discharge  Extraocular Movements: Extraocular movements intact  Conjunctiva/sclera: Conjunctivae normal    Pulmonary:      Effort: Pulmonary effort is normal  No respiratory distress  Musculoskeletal:         General: No deformity or signs of injury  Normal range of motion  Cervical back: Normal range of motion and neck supple  Skin:     General: Skin is warm and dry  Coloration: Skin is not jaundiced or pale  Comments: Please refer to included photographs  There is a rash of the right hand involving several of the digits, the dorsal aspect of the hand, and the palmar aspect of the hand  This is a dry rough scaly rash which causes itching to the patient  There is no drainage or discharge  There is no redness or warmth  Neurological:      General: No focal deficit present  Mental Status: She is alert and oriented to person, place, and time  Cranial Nerves: No cranial nerve deficit  Coordination: Coordination normal       Gait: Gait normal    Psychiatric:         Mood and Affect: Mood normal          Behavior: Behavior normal          Thought Content:  Thought content normal          Judgment: Judgment normal                          Vital Signs  ED Triage Vitals [03/02/23 1846]   Temperature Pulse Respirations Blood Pressure SpO2   97 6 °F (36 4 °C) 73 18 (!) 218/108 100 %      Temp Source Heart Rate Source Patient Position - Orthostatic VS BP Location FiO2 (%)   Temporal Monitor Sitting Left arm --      Pain Score       8           Vitals:    03/02/23 1846   BP: (!) 218/108   Pulse: 73   Patient Position - Orthostatic VS: Sitting         Visual Acuity      ED Medications  Medications - No data to display    Diagnostic Studies  Results Reviewed     None                 No orders to display              Procedures  Procedures         ED Course  ED Course as of 03/02/23 2134   Thu Mar 02, 2023   2005 Discussed with on-call dermatology, recommend 0 05% clobetasol ointment twice daily mixed with mupirocin ointment  They will arrange follow-up with patient  Medical Decision Making  Patient presents with 2 weeks of rash to the right hand not improving with topical steroids  Based on the history provided, the differential diagnosis includes but is not limited to dermatitis, eczema, autoimmune disorder, infection  Based on the work-up performed in the emergency department which includes direct physical examination and consultation with dermatology, it is felt that this is likely an eczematous process  Patient is prescribed topical steroid creams and will follow-up on an outpatient basis with the dermatology office  Eczema, unspecified type: acute illness or injury  Amount and/or Complexity of Data Reviewed  Labs:      Details: Not indicated  Radiology:      Details: Not indicated  Discussion of management or test interpretation with external provider(s): On-call dermatology, Dr Meet Rodas drug management  Disposition  Final diagnoses:   Eczema, unspecified type     Time reflects when diagnosis was documented in both MDM as applicable and the Disposition within this note     Time User Action Codes Description Comment    3/2/2023  8:06 PM Prosper Handing Add [L30 9] Eczema, unspecified type       ED Disposition     ED Disposition   Discharge    Condition   Stable    Date/Time   u Mar 2, 2023  8:05 PM    Comment   Jodi Dee discharge to home/self care                 Follow-up Information     Follow up With Specialties Details Why Contact Info    [address], [place] [address] [address] [place]  [phone]            Discharge Medication List as of 3/2/2023  8:07 PM      START taking these medications    Details   clobetasol (TEMOVATE) 0 05 % ointment Apply topically 2 (two) times a day, Starting Thu 3/2/2023, Normal      mupirocin (BACTROBAN) 2 % ointment Apply topically 3 (three) times a day, Starting Thu 3/2/2023, Normal         CONTINUE these medications which have NOT CHANGED    Details   betamethasone, augmented, (DIPROLENE) 0 05 % lotion Apply topically 2 (two) times a day, Starting Thu 2/23/2023, Normal      levothyroxine 50 mcg tablet Take 1 tablet (50 mcg total) by mouth daily, Starting Mon 10/19/2020, Normal      oxyCODONE (Roxicodone) 5 immediate release tablet Take 1 tablet (5 mg total) by mouth every 6 (six) hours as needed for moderate pain or severe pain Max Daily Amount: 20 mg, Starting Tue 12/14/2021, Normal      triamcinolone (KENALOG) 0 1 % cream Apply topically 2 (two) times a day, Starting Mon 2/27/2023, Normal             No discharge procedures on file      PDMP Review       Value Time User    PDMP Reviewed  Yes 10/18/2020 11:24 AM Leartis Collet, MD          ED Provider  Electronically Signed by           Bc Kent MD  03/02/23 1888

## 2023-03-07 ENCOUNTER — TELEPHONE (OUTPATIENT)
Dept: PSYCHIATRY | Facility: CLINIC | Age: 53
End: 2023-03-07

## 2023-03-21 ENCOUNTER — HOSPITAL ENCOUNTER (EMERGENCY)
Facility: HOSPITAL | Age: 53
Discharge: HOME | End: 2023-03-21
Attending: STUDENT IN AN ORGANIZED HEALTH CARE EDUCATION/TRAINING PROGRAM | Admitting: STUDENT IN AN ORGANIZED HEALTH CARE EDUCATION/TRAINING PROGRAM

## 2023-03-21 ENCOUNTER — APPOINTMENT (EMERGENCY)
Dept: RADIOLOGY | Facility: HOSPITAL | Age: 53
End: 2023-03-21

## 2023-03-21 VITALS
BODY MASS INDEX: 32.05 KG/M2 | DIASTOLIC BLOOD PRESSURE: 88 MMHG | SYSTOLIC BLOOD PRESSURE: 152 MMHG | WEIGHT: 186.73 LBS | RESPIRATION RATE: 18 BRPM | TEMPERATURE: 97.5 F | HEART RATE: 60 BPM | OXYGEN SATURATION: 98 %

## 2023-03-21 DIAGNOSIS — R07.89 ATYPICAL CHEST PAIN: Primary | ICD-10-CM

## 2023-03-21 LAB
2HR DELTA HS TROPONIN: 1 NG/L
ANION GAP SERPL CALCULATED.3IONS-SCNC: 8 MMOL/L (ref 4–13)
BASOPHILS # BLD AUTO: 0.06 THOUSANDS/ÂΜL (ref 0–0.1)
BASOPHILS NFR BLD AUTO: 1 % (ref 0–1)
BUN SERPL-MCNC: 19 MG/DL (ref 5–25)
CALCIUM SERPL-MCNC: 9.5 MG/DL (ref 8.4–10.2)
CARDIAC TROPONIN I PNL SERPL HS: 4 NG/L
CARDIAC TROPONIN I PNL SERPL HS: 5 NG/L
CHLORIDE SERPL-SCNC: 103 MMOL/L (ref 96–108)
CO2 SERPL-SCNC: 26 MMOL/L (ref 21–32)
CREAT SERPL-MCNC: 0.68 MG/DL (ref 0.6–1.3)
EOSINOPHIL # BLD AUTO: 0.4 THOUSAND/ÂΜL (ref 0–0.61)
EOSINOPHIL NFR BLD AUTO: 7 % (ref 0–6)
ERYTHROCYTE [DISTWIDTH] IN BLOOD BY AUTOMATED COUNT: 12.4 % (ref 11.6–15.1)
GFR SERPL CREATININE-BSD FRML MDRD: 101 ML/MIN/1.73SQ M
GLUCOSE SERPL-MCNC: 95 MG/DL (ref 65–140)
HCT VFR BLD AUTO: 41.3 % (ref 34.8–46.1)
HGB BLD-MCNC: 13.6 G/DL (ref 11.5–15.4)
IMM GRANULOCYTES # BLD AUTO: 0.02 THOUSAND/UL (ref 0–0.2)
IMM GRANULOCYTES NFR BLD AUTO: 0 % (ref 0–2)
LYMPHOCYTES # BLD AUTO: 2.02 THOUSANDS/ÂΜL (ref 0.6–4.47)
LYMPHOCYTES NFR BLD AUTO: 37 % (ref 14–44)
MAGNESIUM SERPL-MCNC: 1.9 MG/DL (ref 1.9–2.7)
MCH RBC QN AUTO: 29.9 PG (ref 26.8–34.3)
MCHC RBC AUTO-ENTMCNC: 32.9 G/DL (ref 31.4–37.4)
MCV RBC AUTO: 91 FL (ref 82–98)
MONOCYTES # BLD AUTO: 0.5 THOUSAND/ÂΜL (ref 0.17–1.22)
MONOCYTES NFR BLD AUTO: 9 % (ref 4–12)
NEUTROPHILS # BLD AUTO: 2.51 THOUSANDS/ÂΜL (ref 1.85–7.62)
NEUTS SEG NFR BLD AUTO: 46 % (ref 43–75)
NRBC BLD AUTO-RTO: 0 /100 WBCS
PLATELET # BLD AUTO: 296 THOUSANDS/UL (ref 149–390)
PMV BLD AUTO: 9.1 FL (ref 8.9–12.7)
POTASSIUM SERPL-SCNC: 4.3 MMOL/L (ref 3.5–5.3)
RBC # BLD AUTO: 4.55 MILLION/UL (ref 3.81–5.12)
SODIUM SERPL-SCNC: 137 MMOL/L (ref 135–147)
WBC # BLD AUTO: 5.51 THOUSAND/UL (ref 4.31–10.16)

## 2023-03-21 RX ORDER — KETOROLAC TROMETHAMINE 30 MG/ML
15 INJECTION, SOLUTION INTRAMUSCULAR; INTRAVENOUS ONCE
Status: COMPLETED | OUTPATIENT
Start: 2023-03-21 | End: 2023-03-21

## 2023-03-21 RX ADMIN — KETOROLAC TROMETHAMINE 15 MG: 30 INJECTION, SOLUTION INTRAMUSCULAR at 16:15

## 2023-03-21 RX ADMIN — SODIUM CHLORIDE 1000 ML: 0.9 INJECTION, SOLUTION INTRAVENOUS at 14:59

## 2023-03-21 NOTE — ED PROVIDER NOTES
History  Chief Complaint   Patient presents with   • Chest Pain     Patient c/o intermittent chest pain since Saturday with numbness going down left arm  History provided by:  Patient  Chest Pain  Pain location:  Substernal area  Pain quality: dull and pressure    Pain radiates to:  Does not radiate  Pain radiates to the back: no    Pain severity:  Moderate  Onset quality:  Gradual  Duration:  4 days  Timing:  Intermittent  Progression:  Waxing and waning  Chronicity:  New  Relieved by:  None tried  Worsened by:  Nothing tried  Ineffective treatments:  None tried  Associated symptoms: anxiety and dizziness    Associated symptoms: no abdominal pain, no altered mental status, no back pain, no cough, no diaphoresis, no fatigue, no fever, no headache, no heartburn, no nausea, no near-syncope, no palpitations, no shortness of breath, no syncope, not vomiting and no weakness    Risk factors: hypertension    Risk factors: no smoking       47year old F  Presents with chest pain  Intermittent over the last 4 days  No alleviating or aggravating factors  Hasn't taken anything for pain  Expresses mild shortness of breath  Hx of asthma  Had mild wheezing yesterday--improved with inhaler  Felt a little lightheaded while at work today  Eating/drinking well  Does not feel like previous GERD/acid reflux  S/p stress test in 2020 which was negative  Prior to Admission Medications   Prescriptions Last Dose Informant Patient Reported? Taking?    betamethasone, augmented, (DIPROLENE) 0 05 % lotion   No No   Sig: Apply topically 2 (two) times a day   clobetasol (TEMOVATE) 0 05 % ointment   No No   Sig: Apply topically 2 (two) times a day   levothyroxine 50 mcg tablet   No No   Sig: Take 1 tablet (50 mcg total) by mouth daily   Patient taking differently: Take 75 mcg by mouth daily   mupirocin (BACTROBAN) 2 % ointment   No No   Sig: Apply topically 3 (three) times a day   triamcinolone (KENALOG) 0 1 % cream   No No   Sig: Apply topically 2 (two) times a day      Facility-Administered Medications: None     Past Medical History:   Diagnosis Date   • Coronary artery disease    • Disease of thyroid gland    • Hypertension    • MI ([facility]ocardial infarction) ([facility] [address] )    • [facility]     Attempt to OD on tylenol in 2021       Past Surgical History:   Procedure Laterality Date   • HYSTERECTOMY         History reviewed  No pertinent family history  I have reviewed and agree with the history as documented  E-Cigarette/Vaping   • E-Cigarette Use Never User      E-Cigarette/Vaping Substances     Social History     Tobacco Use   • Smoking status: Never   • Smokeless tobacco: Never   Vaping Use   • Vaping Use: Never used   Substance Use Topics   • Alcohol use: Never   • Drug use: Never     Review of Systems   Constitutional: Negative for activity change, diaphoresis, fatigue and fever  HENT: Negative for congestion, rhinorrhea, sinus pressure and sinus pain  Eyes: Negative for photophobia, pain, redness and visual disturbance  Respiratory: Positive for chest tightness  Negative for cough, shortness of breath and wheezing  Cardiovascular: Positive for chest pain  Negative for palpitations, syncope and near-syncope  Gastrointestinal: Negative for abdominal distention, abdominal pain, constipation, diarrhea, heartburn, nausea and vomiting  Genitourinary: Negative for decreased urine volume, difficulty urinating, dysuria, flank pain, frequency, pelvic pain and urgency  Musculoskeletal: Negative for arthralgias, back pain, myalgias, neck pain and neck stiffness  Skin: Negative for color change, pallor, rash and wound  Neurological: Positive for dizziness and light-headedness  Negative for syncope, speech difficulty, weakness and headaches  Hematological: Does not bruise/bleed easily  Psychiatric/Behavioral: Negative for confusion and sleep disturbance  The patient is nervous/anxious      All other systems reviewed and are negative  Physical Exam  Physical Exam  Vitals and nursing note reviewed  Constitutional:       General: She is not in acute distress  Appearance: She is not ill-appearing or toxic-appearing  HENT:      Head: Normocephalic and atraumatic  Right Ear: External ear normal       Left Ear: External ear normal       Nose: No congestion or rhinorrhea  Mouth/Throat:      Mouth: Mucous membranes are moist       Pharynx: Oropharynx is clear  No oropharyngeal exudate or posterior oropharyngeal erythema  Eyes:      General:         Right eye: No discharge  Left eye: No discharge  Extraocular Movements: Extraocular movements intact  Conjunctiva/sclera: Conjunctivae normal       Pupils: Pupils are equal, round, and reactive to light  Cardiovascular:      Rate and Rhythm: Normal rate and regular rhythm  Pulses: Normal pulses  Heart sounds: Normal heart sounds  No murmur heard  Pulmonary:      Effort: Pulmonary effort is normal  No tachypnea, accessory muscle usage or respiratory distress  Breath sounds: Normal breath sounds  No stridor  No decreased breath sounds, wheezing, rhonchi or rales  Chest:      Chest wall: No tenderness  Abdominal:      General: Abdomen is flat  Bowel sounds are normal  There is no distension  Palpations: Abdomen is soft  There is no mass  Tenderness: There is no abdominal tenderness  There is no right CVA tenderness, left CVA tenderness, guarding or rebound  Hernia: No hernia is present  Musculoskeletal:         General: No swelling, tenderness, deformity or signs of injury  Cervical back: Normal range of motion and neck supple  No tenderness  Right lower leg: No tenderness  No edema  Left lower leg: No tenderness  No edema  Skin:     General: Skin is warm and dry  Capillary Refill: Capillary refill takes less than 2 seconds  Coloration: Skin is not cyanotic, jaundiced or pale        Findings: No bruising, ecchymosis, erythema or rash  Nails: There is no clubbing  Neurological:      General: No focal deficit present  Mental Status: She is alert and oriented to person, place, and time  Cranial Nerves: No cranial nerve deficit  Sensory: No sensory deficit  Motor: No weakness  Psychiatric:         Mood and Affect: Mood is anxious  Behavior: Behavior normal  Behavior is not agitated  Thought Content:  Thought content normal          Judgment: Judgment normal      Vital Signs  ED Triage Vitals   Temperature Pulse Respirations Blood Pressure SpO2   03/21/23 1421 03/21/23 1421 03/21/23 1421 03/21/23 1421 03/21/23 1421   97 5 °F (36 4 °C) 70 18 (!) 203/98 100 %      Temp Source Heart Rate Source Patient Position - Orthostatic VS BP Location FiO2 (%)   03/21/23 1421 03/21/23 1421 03/21/23 1421 03/21/23 1421 --   Temporal Monitor Lying Left arm       Pain Score       03/21/23 1615       7           Vitals:    03/21/23 1530 03/21/23 1630 03/21/23 1715 03/21/23 1745   BP: (!) 177/81 166/85 156/78 152/88   Pulse: 61 59 58 60   Patient Position - Orthostatic VS:  Lying  Lying     ED Medications  Medications   sodium chloride 0 9 % bolus 1,000 mL (0 mL Intravenous Stopped 3/21/23 1643)   ketorolac (TORADOL) injection 15 mg (15 mg Intravenous Given 3/21/23 1615)     Diagnostic Studies  Results Reviewed     Procedure Component Value Units Date/Time    HS Troponin I 2hr [[id number]]  (Normal) Collected: 03/21/23 1643    Lab Status: Final result Specimen: Blood from Arm, Left Updated: 03/21/23 1714     hs TnI 2hr 5 ng/L      Delta 2hr hsTnI 1 ng/L     HS Troponin I 4hr [[id number]]     Lab Status: No result Specimen: Blood     HS Troponin 0hr (reflex protocol) [[id number]]  (Normal) Collected: 03/21/23 1458    Lab Status: Final result Specimen: Blood from Line, Venous Updated: 03/21/23 1528     hs TnI 0hr 4 ng/L     Basic metabolic panel [[id number]] Collected: 03/21/23 1458    Lab Status: Final result Specimen: Blood from Line, Venous Updated: 03/21/23 1520     Sodium 137 mmol/L      Potassium 4 3 mmol/L      Chloride 103 mmol/L      CO2 26 mmol/L      ANION GAP 8 mmol/L      BUN 19 mg/dL      Creatinine 0 68 mg/dL      Glucose 95 mg/dL      Calcium 9 5 mg/dL      eGFR 101 ml/min/1 73sq m     Narrative:      [place] guidelines for Chronic Kidney Disease (CKD):   •  Stage 1 with normal or high GFR (GFR > 90 mL/min/1 73 square meters)  •  Stage 2 Mild CKD (GFR = 60-89 mL/min/1 73 square meters)  •  Stage 3A Moderate CKD (GFR = 45-59 mL/min/1 73 square meters)  •  Stage 3B Moderate CKD (GFR = 30-44 mL/min/1 73 square meters)  •  Stage 4 Severe CKD (GFR = 15-29 mL/min/1 73 square meters)  •  Stage 5 End Stage CKD (GFR <15 mL/min/1 73 square meters)  Note: GFR calculation is accurate only with a steady state creatinine    Magnesium [[id number]]  (Normal) Collected: 03/21/23 1458    Lab Status: Final result Specimen: Blood from Line, Venous Updated: 03/21/23 1520     Magnesium 1 9 mg/dL     CBC and differential [[id number]]  (Abnormal) Collected: 03/21/23 1458    Lab Status: Final result Specimen: Blood from Line, Venous Updated: 03/21/23 1505     WBC 5 51 Thousand/uL      RBC 4 55 Million/uL      Hemoglobin 13 6 g/dL      Hematocrit 41 3 %      MCV 91 fL      MCH 29 9 pg      MCHC 32 9 g/dL      RDW 12 4 %      MPV 9 1 fL      Platelets 977 Thousands/uL      nRBC 0 /100 WBCs      Neutrophils Relative 46 %      Immat GRANS % 0 %      Lymphocytes Relative 37 %      Monocytes Relative 9 %      Eosinophils Relative 7 %      Basophils Relative 1 %      Neutrophils Absolute 2 51 Thousands/µL      Immature Grans Absolute 0 02 Thousand/uL      Lymphocytes Absolute 2 02 Thousands/µL      Monocytes Absolute 0 50 Thousand/µL      Eosinophils Absolute 0 40 Thousand/µL      Basophils Absolute 0 06 Thousands/µL              XR chest 1 view portable   ED Interpretation by Myranda Aguirre DO Yani (03/21 1532)   No acute cardiopulmonary disease noted on chest x-ray             Procedures  ECG 12 Lead Documentation Only    Date/Time: 3/21/2023 2:21 PM  Performed by: Tessy Miller DO  Authorized by: Tessy Miller DO     Indications / Diagnosis:  Chest discomfort  ECG reviewed by me, the ED Provider: yes    Patient location:  ED  Previous ECG:     Previous ECG:  Unavailable  Interpretation:     Interpretation: normal    Rate:     ECG rate:  69    ECG rate assessment: normal    Rhythm:     Rhythm: sinus rhythm    Ectopy:     Ectopy: none    QRS:     QRS axis:  Normal    QRS intervals:  Normal  Conduction:     Conduction: normal    ST segments:     ST segments:  Normal  T waves:     T waves: inverted      Inverted:  III      ED Course  ED Course as of 03/21/23 1828   Tue Mar 21, 2023   1507 No leukocytosis  Hemoglobin is within normal limits  1521 BP improving without medication    1531 Troponin negative  ECG interpretation noted  1532 Chest x-ray without acute findings  1275 Oumou Hart troponin negative  Blood pressure continues to improve  Medical Decision Making  The differential diagnoses include but are not limited to ACS, PNA, hypertensive urgency   46 yo F  Hx of HTN  Presents with intermittent chest discomfort x 4 days  Vital signs stable throughout course of treatment  ECG w/o acute ischemic changes  Troponin x2 negative  Low concern for ACS  CXR without acute findings  BP improved without medication  PCP follow up encouraged  Return precautions discussed  Stable for discharge  Atypical chest pain: acute illness or injury  Amount and/or Complexity of Data Reviewed  External Data Reviewed: labs and notes  Labs: ordered  Decision-making details documented in ED Course  Radiology: ordered and independent interpretation performed  Decision-making details documented in ED Course  ECG/medicine tests: ordered and independent interpretation performed   Decision-making details documented in ED Course  Risk  Prescription drug management  Disposition  Final diagnoses:   Atypical chest pain     Time reflects when diagnosis was documented in both MDM as applicable and the Disposition within this note     Time User Action Codes Description Comment    3/21/2023  5:44 PM Ghazala Gonzalez Add [R07 89] Atypical chest pain       ED Disposition     ED Disposition   Discharge    Condition   Stable    Date/Time   Tue Mar 21, 2023  5:44 PM    Comment   Jodi Dee discharge to home/self care  Follow-up Information     Follow up With Specialties Details Why Contact Info    [address], [place] [address] [address] [place]  [phone]            Discharge Medication List as of 3/21/2023  5:45 PM      CONTINUE these medications which have NOT CHANGED    Details   betamethasone, augmented, (DIPROLENE) 0 05 % lotion Apply topically 2 (two) times a day, Starting Thu 2/23/2023, Normal      clobetasol (TEMOVATE) 0 05 % ointment Apply topically 2 (two) times a day, Starting Thu 3/2/2023, Normal      levothyroxine 50 mcg tablet Take 1 tablet (50 mcg total) by mouth daily, Starting Mon 10/19/2020, Normal      mupirocin (BACTROBAN) 2 % ointment Apply topically 3 (three) times a day, Starting Thu 3/2/2023, Normal      triamcinolone (KENALOG) 0 1 % cream Apply topically 2 (two) times a day, Starting Mon 2/27/2023, Normal             No discharge procedures on file      PDMP Review       Value Time User    PDMP Reviewed  Yes 10/18/2020 11:24 AM aMki Austin MD          ED Provider  Electronically Signed by           Ashley Aguayo DO  03/21/23 8221

## 2023-03-21 NOTE — DISCHARGE INSTRUCTIONS
The laboratory and imaging studies that were obtained did not show any significant abnormalities  Continue all prescribed medications and follow-up with your primary care provider  Do not hesitate to be reevaluated in the ED for any concerning signs or symptoms

## 2023-03-22 LAB
ATRIAL RATE: 69 BPM
P AXIS: 56 DEGREES
PR INTERVAL: 156 MS
QRS AXIS: -11 DEGREES
QRSD INTERVAL: 80 MS
QT INTERVAL: 420 MS
QTC INTERVAL: 450 MS
T WAVE AXIS: 5 DEGREES
VENTRICULAR RATE: 69 BPM

## 2023-07-08 ENCOUNTER — HOSPITAL ENCOUNTER (EMERGENCY)
Facility: HOSPITAL | Age: 53
End: 2023-07-09
Attending: STUDENT IN AN ORGANIZED HEALTH CARE EDUCATION/TRAINING PROGRAM
Payer: COMMERCIAL

## 2023-07-08 DIAGNOSIS — R45.851 SUICIDAL IDEATIONS: ICD-10-CM

## 2023-07-08 DIAGNOSIS — F32.A DEPRESSION: Primary | ICD-10-CM

## 2023-07-08 LAB
ANION GAP SERPL CALCULATED.3IONS-SCNC: 9 MMOL/L
B-HCG SERPL-ACNC: 1 MIU/ML (ref 0–5)
BASOPHILS # BLD AUTO: 0.05 THOUSANDS/ÂΜL (ref 0–0.1)
BASOPHILS NFR BLD AUTO: 1 % (ref 0–1)
BUN SERPL-MCNC: 17 MG/DL (ref 5–25)
CALCIUM SERPL-MCNC: 9.4 MG/DL (ref 8.4–10.2)
CHLORIDE SERPL-SCNC: 106 MMOL/L (ref 96–108)
CO2 SERPL-SCNC: 27 MMOL/L (ref 21–32)
CREAT SERPL-MCNC: 0.94 MG/DL (ref 0.6–1.3)
EOSINOPHIL # BLD AUTO: 0.3 THOUSAND/ÂΜL (ref 0–0.61)
EOSINOPHIL NFR BLD AUTO: 5 % (ref 0–6)
ERYTHROCYTE [DISTWIDTH] IN BLOOD BY AUTOMATED COUNT: 12.4 % (ref 11.6–15.1)
ETHANOL SERPL-MCNC: <10 MG/DL
GFR SERPL CREATININE-BSD FRML MDRD: 70 ML/MIN/1.73SQ M
GLUCOSE SERPL-MCNC: 111 MG/DL (ref 65–140)
HCT VFR BLD AUTO: 45.2 % (ref 34.8–46.1)
HGB BLD-MCNC: 15.1 G/DL (ref 11.5–15.4)
IMM GRANULOCYTES # BLD AUTO: 0.02 THOUSAND/UL (ref 0–0.2)
IMM GRANULOCYTES NFR BLD AUTO: 0 % (ref 0–2)
LYMPHOCYTES # BLD AUTO: 2.42 THOUSANDS/ÂΜL (ref 0.6–4.47)
LYMPHOCYTES NFR BLD AUTO: 38 % (ref 14–44)
MCH RBC QN AUTO: 29.8 PG (ref 26.8–34.3)
MCHC RBC AUTO-ENTMCNC: 33.4 G/DL (ref 31.4–37.4)
MCV RBC AUTO: 89 FL (ref 82–98)
MONOCYTES # BLD AUTO: 0.45 THOUSAND/ÂΜL (ref 0.17–1.22)
MONOCYTES NFR BLD AUTO: 7 % (ref 4–12)
NEUTROPHILS # BLD AUTO: 3.08 THOUSANDS/ÂΜL (ref 1.85–7.62)
NEUTS SEG NFR BLD AUTO: 49 % (ref 43–75)
NRBC BLD AUTO-RTO: 0 /100 WBCS
PLATELET # BLD AUTO: 292 THOUSANDS/UL (ref 149–390)
PMV BLD AUTO: 9.2 FL (ref 8.9–12.7)
POTASSIUM SERPL-SCNC: 3.6 MMOL/L (ref 3.5–5.3)
RBC # BLD AUTO: 5.06 MILLION/UL (ref 3.81–5.12)
SODIUM SERPL-SCNC: 142 MMOL/L (ref 135–147)
WBC # BLD AUTO: 6.32 THOUSAND/UL (ref 4.31–10.16)

## 2023-07-08 PROCEDURE — 84702 CHORIONIC GONADOTROPIN TEST: CPT | Performed by: STUDENT IN AN ORGANIZED HEALTH CARE EDUCATION/TRAINING PROGRAM

## 2023-07-08 PROCEDURE — 99285 EMERGENCY DEPT VISIT HI MDM: CPT

## 2023-07-08 PROCEDURE — 82077 ASSAY SPEC XCP UR&BREATH IA: CPT

## 2023-07-08 PROCEDURE — 80048 BASIC METABOLIC PNL TOTAL CA: CPT | Performed by: STUDENT IN AN ORGANIZED HEALTH CARE EDUCATION/TRAINING PROGRAM

## 2023-07-08 PROCEDURE — 36415 COLL VENOUS BLD VENIPUNCTURE: CPT | Performed by: STUDENT IN AN ORGANIZED HEALTH CARE EDUCATION/TRAINING PROGRAM

## 2023-07-08 PROCEDURE — 85025 COMPLETE CBC W/AUTO DIFF WBC: CPT | Performed by: STUDENT IN AN ORGANIZED HEALTH CARE EDUCATION/TRAINING PROGRAM

## 2023-07-08 NOTE — ED NOTES
Pt taken to ED 7 by RN. Process/plan of care explained to patient by Lily Aguillon RN. Room being prepared for safety. ED Tech, [facility], assigned to sit 1:1. Continual 1:1 order placed as per protocol.       Dario Barrett RN  07/08/23 1256

## 2023-07-08 NOTE — ED PROVIDER NOTES
History  Chief Complaint   Patient presents with   • Psychiatric Evaluation     Thinking about suicide for 1 week, wrote multiple suicide notes. Pt reports " I came close the other night to committing suicide but didn't do it" Pt reports " I'm losing my health insurance, my work is stressful, I'm behind on  bills". History provided by:  Patient  Psychiatric Evaluation  Presenting symptoms: depression, suicidal thoughts and suicidal threats    Presenting symptoms: no agitation, no bizarre behavior, no delusions, no hallucinations, no homicidal ideas, no paranoid behavior, no self-mutilation and no suicide attempt    Degree of incapacity (severity): Moderate  Onset quality:  Gradual  Duration:  2 weeks  Timing:  Constant  Progression:  Worsening  Chronicity:  New  Context: stressful life event    Context comment:  Hx of suicide attempt in 2021. Worsening depression/stress. Losing her health insurance and having problems paying bills. Intermittent SI x 2 weeks. Has plan (OD on pills). Wrote multiple suicide notes. Relieved by:  Nothing  Worsened by:  Nothing  Associated symptoms: anxiety, appetite change, fatigue, feelings of worthlessness and insomnia    Associated symptoms: no abdominal pain, no chest pain, not distractible, no headaches and no irritability    Risk factors: hx of mental illness and hx of suicide attempts      Prior to Admission Medications   Prescriptions Last Dose Informant Patient Reported? Taking?    betamethasone, augmented, (DIPROLENE) 0.05 % lotion   No No   Sig: Apply topically 2 (two) times a day   clobetasol (TEMOVATE) 0.05 % ointment   No No   Sig: Apply topically 2 (two) times a day   levothyroxine 50 mcg tablet   No No   Sig: Take 1 tablet (50 mcg total) by mouth daily   Patient taking differently: Take 75 mcg by mouth daily   mupirocin (BACTROBAN) 2 % ointment   No No   Sig: Apply topically 3 (three) times a day   triamcinolone (KENALOG) 0.1 % cream   No No   Sig: Apply topically 2 (two) times a day      Facility-Administered Medications: None       Past Medical History:   Diagnosis Date   • Coronary artery disease    • Disease of thyroid gland    • Hypertension    • MI (myocardial infarction) ([address])    • Suicide ([address])     Attempt to OD on tylenol in 2021       Past Surgical History:   Procedure Laterality Date   • HYSTERECTOMY         History reviewed. No pertinent family history. I have reviewed and agree with the history as documented. E-Cigarette/Vaping   • E-Cigarette Use Never User      E-Cigarette/Vaping Substances     Social History     Tobacco Use   • Smoking status: Never   • Smokeless tobacco: Never   Vaping Use   • Vaping Use: Never used   Substance Use Topics   • Alcohol use: Never   • Drug use: Never       Review of Systems   Constitutional: Positive for activity change, appetite change and fatigue. Negative for chills, diaphoresis, fever and irritability. HENT: Negative for congestion, rhinorrhea, sinus pressure, sinus pain and sore throat. Eyes: Negative for pain, discharge, redness, itching and visual disturbance. Respiratory: Negative for cough, chest tightness, shortness of breath and wheezing. Cardiovascular: Negative for chest pain, palpitations and leg swelling. Gastrointestinal: Negative for abdominal distention, abdominal pain, constipation, diarrhea, nausea and vomiting. Genitourinary: Negative for decreased urine volume, difficulty urinating, dysuria, flank pain, frequency and urgency. Musculoskeletal: Negative for arthralgias, back pain, myalgias, neck pain and neck stiffness. Skin: Negative for color change, pallor, rash and wound. Neurological: Negative for dizziness, syncope, facial asymmetry, weakness, light-headedness, numbness and headaches. Hematological: Does not bruise/bleed easily. Psychiatric/Behavioral: Positive for suicidal ideas.  Negative for agitation, behavioral problems, confusion, decreased concentration, hallucinations, homicidal ideas, paranoia, self-injury and sleep disturbance. The patient is nervous/anxious and has insomnia. All other systems reviewed and are negative. Physical Exam  Physical Exam  Vitals and nursing note reviewed. Constitutional:       General: She is not in acute distress. Appearance: She is not ill-appearing or toxic-appearing. HENT:      Head: Normocephalic and atraumatic. Right Ear: External ear normal.      Left Ear: External ear normal.      Nose: No congestion or rhinorrhea. Eyes:      General:         Right eye: No discharge. Left eye: No discharge. Extraocular Movements: Extraocular movements intact. Conjunctiva/sclera: Conjunctivae normal.   Cardiovascular:      Rate and Rhythm: Normal rate and regular rhythm. Pulses: Normal pulses. Heart sounds: Normal heart sounds. No murmur heard. Pulmonary:      Effort: Pulmonary effort is normal. No respiratory distress. Breath sounds: Normal breath sounds. No stridor. No wheezing, rhonchi or rales. Chest:      Chest wall: No tenderness. Abdominal:      General: Abdomen is flat. Bowel sounds are normal. There is no distension. Palpations: Abdomen is soft. There is no mass. Tenderness: There is no abdominal tenderness. There is no right CVA tenderness, left CVA tenderness, guarding or rebound. Hernia: No hernia is present. Musculoskeletal:         General: No swelling, tenderness, deformity or signs of injury. Cervical back: Neck supple. No tenderness. Right lower leg: No edema. Left lower leg: No edema. Skin:     General: Skin is warm and dry. Capillary Refill: Capillary refill takes less than 2 seconds. Coloration: Skin is not jaundiced or pale. Findings: No bruising, erythema, lesion or rash. Neurological:      General: No focal deficit present. Mental Status: She is alert and oriented to person, place, and time.       Cranial Nerves: No cranial nerve deficit. Sensory: No sensory deficit. Motor: No weakness. Coordination: Coordination normal.   Psychiatric:         Mood and Affect: Mood is anxious and depressed. Affect is tearful. Behavior: Behavior normal.         Thought Content: Thought content normal.         Judgment: Judgment normal.       Vital Signs  ED Triage Vitals [07/08/23 1853]   Temperature Pulse Respirations Blood Pressure SpO2   99.2 °F (37.3 °C) 69 16 (!) 200/131 100 %      Temp Source Heart Rate Source Patient Position - Orthostatic VS BP Location FiO2 (%)   Temporal Monitor -- Left arm --      Pain Score       No Pain           Vitals:    07/08/23 1853   BP: (!) 200/131   Pulse: 69         Visual Acuity      ED Medications  Medications - No data to display    Diagnostic Studies  Results Reviewed     Procedure Component Value Units Date/Time    Rapid drug screen, urine [[id number]]  (Normal) Collected: 07/09/23 0606    Lab Status: Final result Specimen: Urine, Catheter Updated: 07/09/23 7207     Amph/Meth UR Negative     Barbiturate Ur Negative     Benzodiazepine Urine Negative     Cocaine Urine Negative     Methadone Urine Negative     Opiate Urine Negative     PCP Ur Negative     THC Urine Negative     Oxycodone Urine Negative    Narrative:      FOR MEDICAL PURPOSES ONLY. IF CONFIRMATION NEEDED PLEASE CONTACT THE LAB WITHIN 5 DAYS.     Drug Screen Cutoff Levels:  AMPHETAMINE/METHAMPHETAMINES  1000 ng/mL  BARBITURATES     200 ng/mL  BENZODIAZEPINES     200 ng/mL  COCAINE      300 ng/mL  METHADONE      300 ng/mL  OPIATES      300 ng/mL  PHENCYCLIDINE     25 ng/mL  THC       50 ng/mL  OXYCODONE      100 ng/mL    hCG, quantitative [[id number]]  (Normal) Collected: 07/08/23 1940    Lab Status: Final result Specimen: Blood from Arm, Left Updated: 07/08/23 2018     HCG, Quant 1 mIU/mL     Narrative:       Expected Ranges:    HCG results between 5 and 25 mIU/mL may be indicative of early pregnancy but should be interpreted in light of the total clinical presentation. HCG can rise to detectable levels in francisco and post menopausal women (0-11.6 mIU/mL).      Approximate               Approximate HCG  Gestation age          Concentration ( mIU/mL)  _____________          ______________________   Edyta Brothers                      HCG values  0.2-1                       5-50  1-2                           2-3                         100-5000  3-4                         500-45518  4-5                         1000-57418  5-6                         35688-107780  6-8                         25547-038724  8-12                        99533-665330      Ethanol [[id number]]  (Normal) Collected: 07/08/23 1940    Lab Status: Final result Specimen: Blood from Arm, Left Updated: 07/08/23 2015     Ethanol Lvl <10 mg/dL     Basic metabolic panel [[id number]] Collected: 07/08/23 1940    Lab Status: Final result Specimen: Blood from Arm, Left Updated: 07/08/23 2006     Sodium 142 mmol/L      Potassium 3.6 mmol/L      Chloride 106 mmol/L      CO2 27 mmol/L      ANION GAP 9 mmol/L      BUN 17 mg/dL      Creatinine 0.94 mg/dL      Glucose 111 mg/dL      Calcium 9.4 mg/dL      eGFR 70 ml/min/1.73sq m     Narrative:      [place] guidelines for Chronic Kidney Disease (CKD):   •  Stage 1 with normal or high GFR (GFR > 90 mL/min/1.73 square meters)  •  Stage 2 Mild CKD (GFR = 60-89 mL/min/1.73 square meters)  •  Stage 3A Moderate CKD (GFR = 45-59 mL/min/1.73 square meters)  •  Stage 3B Moderate CKD (GFR = 30-44 mL/min/1.73 square meters)  •  Stage 4 Severe CKD (GFR = 15-29 mL/min/1.73 square meters)  •  Stage 5 End Stage CKD (GFR <15 mL/min/1.73 square meters)  Note: GFR calculation is accurate only with a steady state creatinine    CBC and differential [[id number]] Collected: 07/08/23 1940    Lab Status: Final result Specimen: Blood from Arm, Left Updated: 07/08/23 1944     WBC 6.32 Thousand/uL      RBC 5.06 Million/uL      Hemoglobin 15.1 g/dL      Hematocrit 45.2 %      MCV 89 fL      MCH 29.8 pg      MCHC 33.4 g/dL      RDW 12.4 %      MPV 9.2 fL      Platelets 977 Thousands/uL      nRBC 0 /100 WBCs      Neutrophils Relative 49 %      Immat GRANS % 0 %      Lymphocytes Relative 38 %      Monocytes Relative 7 %      Eosinophils Relative 5 %      Basophils Relative 1 %      Neutrophils Absolute 3.08 Thousands/µL      Immature Grans Absolute 0.02 Thousand/uL      Lymphocytes Absolute 2.42 Thousands/µL      Monocytes Absolute 0.45 Thousand/µL      Eosinophils Absolute 0.30 Thousand/µL      Basophils Absolute 0.05 Thousands/µL                  No orders to display              Procedures  Procedures     ED Course  ED Course as of 07/09/23 1338   Sat Jul 08, 2023 2019 No leukocytosis. Hemoglobin is within normal limits. Ethanol negative. No significant abnormalities noted on BMP. Pregnancy negative. SBIRT 22yo+    Flowsheet Row Most Recent Value   Initial Alcohol Screen: US AUDIT-C     1. How often do you have a drink containing alcohol? 0 Filed at: 07/08/2023 1853   2. How many drinks containing alcohol do you have on a typical day you are drinking? 0 Filed at: 07/08/2023 1853   3a. Male UNDER 65: How often do you have five or more drinks on one occasion? 0 Filed at: 07/08/2023 1853   3b. FEMALE Any Age, or MALE 65+: How often do you have 4 or more drinks on one occassion? 0 Filed at: 07/08/2023 1853   Audit-C Score 0 Filed at: 07/08/2023 1853   BILLY: How many times in the past year have you. .. Used an illegal drug or used a prescription medication for non-medical reasons? Never Filed at: 07/08/2023 1853        Medical Decision Making  The differential diagnoses include but are not limited to depression, anxiety, suicidal ideations, mood disorder  Vital signs reviewed. Worsening suicidal ideations with plan, depression. Medically cleared. Awaiting crisis evaluation. Signed out to Dr. Adelia Ugalde.   Plan discussed. Depression: acute illness or injury  Suicidal ideations: acute illness or injury  Amount and/or Complexity of Data Reviewed  Labs: ordered. Decision-making details documented in ED Course. Risk  Decision regarding hospitalization. Disposition  Final diagnoses:   Depression   Suicidal ideations     Time reflects when diagnosis was documented in both MDM as applicable and the Disposition within this note     Time User Action Codes Description Comment    7/9/2023  1:01 AM Ninfa Maharaj Add Pamela.[place]. A] Depression     7/9/2023  1:01 AM Ninfa Maharaj Add [U85.703] Suicidal ideations       ED Disposition     ED Disposition   Transfer to [address] [place] [address]   --    Date/Time   Sun Jul 9, 2023  1:01 AM    Comment   Kyree Dee should be transferred out to [facility] and has been medically cleared. Follow-up Information    None         Patient's Medications   Discharge Prescriptions    No medications on file       No discharge procedures on file.     PDMP Review       Value Time User    PDMP Reviewed  Yes 10/18/2020 11:24 AM Alta Helm MD          ED Provider  Electronically Signed by           Wes Robb DO  07/09/23 6281

## 2023-07-09 ENCOUNTER — HOSPITAL ENCOUNTER (INPATIENT)
Facility: HOSPITAL | Age: 53
LOS: 11 days | Discharge: HOME | DRG: 751 | End: 2023-07-20
Attending: STUDENT IN AN ORGANIZED HEALTH CARE EDUCATION/TRAINING PROGRAM | Admitting: STUDENT IN AN ORGANIZED HEALTH CARE EDUCATION/TRAINING PROGRAM
Payer: COMMERCIAL

## 2023-07-09 VITALS
OXYGEN SATURATION: 99 % | BODY MASS INDEX: 30.55 KG/M2 | SYSTOLIC BLOOD PRESSURE: 148 MMHG | TEMPERATURE: 98.1 F | RESPIRATION RATE: 17 BRPM | HEART RATE: 67 BPM | DIASTOLIC BLOOD PRESSURE: 92 MMHG | WEIGHT: 178 LBS

## 2023-07-09 DIAGNOSIS — F33.9 RECURRENT MAJOR DEPRESSIVE DISORDER (HCC): ICD-10-CM

## 2023-07-09 DIAGNOSIS — F41.9 ANXIETY: ICD-10-CM

## 2023-07-09 DIAGNOSIS — E78.5 HYPERLIPIDEMIA: ICD-10-CM

## 2023-07-09 DIAGNOSIS — I10 HYPERTENSION: ICD-10-CM

## 2023-07-09 DIAGNOSIS — E03.9 HYPOTHYROIDISM: Primary | ICD-10-CM

## 2023-07-09 DIAGNOSIS — F32.A DEPRESSION: ICD-10-CM

## 2023-07-09 LAB
AMPHETAMINES SERPL QL SCN: NEGATIVE
BARBITURATES UR QL: NEGATIVE
BENZODIAZ UR QL: NEGATIVE
COCAINE UR QL: NEGATIVE
METHADONE UR QL: NEGATIVE
OPIATES UR QL SCN: NEGATIVE
OXYCODONE+OXYMORPHONE UR QL SCN: NEGATIVE
PCP UR QL: NEGATIVE
THC UR QL: NEGATIVE

## 2023-07-09 PROCEDURE — 80307 DRUG TEST PRSMV CHEM ANLYZR: CPT

## 2023-07-09 RX ORDER — DIPHENHYDRAMINE HYDROCHLORIDE 50 MG/ML
50 INJECTION INTRAMUSCULAR; INTRAVENOUS EVERY 6 HOURS PRN
Status: DISCONTINUED | OUTPATIENT
Start: 2023-07-09 | End: 2023-07-20 | Stop reason: HOSPADM

## 2023-07-09 RX ORDER — OLANZAPINE 5 MG/1
5 TABLET ORAL
Status: DISCONTINUED | OUTPATIENT
Start: 2023-07-09 | End: 2023-07-20 | Stop reason: HOSPADM

## 2023-07-09 RX ORDER — HYDROXYZINE 50 MG/1
50 TABLET, FILM COATED ORAL
Status: DISCONTINUED | OUTPATIENT
Start: 2023-07-09 | End: 2023-07-20 | Stop reason: HOSPADM

## 2023-07-09 RX ORDER — IBUPROFEN 800 MG/1
800 TABLET ORAL EVERY 8 HOURS PRN
Status: DISCONTINUED | OUTPATIENT
Start: 2023-07-09 | End: 2023-07-20 | Stop reason: HOSPADM

## 2023-07-09 RX ORDER — IBUPROFEN 400 MG/1
400 TABLET ORAL EVERY 4 HOURS PRN
Status: CANCELLED | OUTPATIENT
Start: 2023-07-09

## 2023-07-09 RX ORDER — IBUPROFEN 400 MG/1
800 TABLET ORAL EVERY 8 HOURS PRN
Status: CANCELLED | OUTPATIENT
Start: 2023-07-09

## 2023-07-09 RX ORDER — HYDROXYZINE 50 MG/1
100 TABLET, FILM COATED ORAL
Status: DISCONTINUED | OUTPATIENT
Start: 2023-07-09 | End: 2023-07-20 | Stop reason: HOSPADM

## 2023-07-09 RX ORDER — BENZTROPINE MESYLATE 1 MG/1
1 TABLET ORAL
Status: DISCONTINUED | OUTPATIENT
Start: 2023-07-09 | End: 2023-07-20 | Stop reason: HOSPADM

## 2023-07-09 RX ORDER — LORAZEPAM 2 MG/ML
2 INJECTION INTRAMUSCULAR EVERY 6 HOURS PRN
Status: CANCELLED | OUTPATIENT
Start: 2023-07-09

## 2023-07-09 RX ORDER — BISACODYL 10 MG
10 SUPPOSITORY, RECTAL RECTAL DAILY PRN
Status: CANCELLED | OUTPATIENT
Start: 2023-07-09

## 2023-07-09 RX ORDER — POLYETHYLENE GLYCOL 3350 17 G/17G
17 POWDER, FOR SOLUTION ORAL DAILY PRN
Status: DISCONTINUED | OUTPATIENT
Start: 2023-07-09 | End: 2023-07-10

## 2023-07-09 RX ORDER — IBUPROFEN 400 MG/1
400 TABLET ORAL EVERY 4 HOURS PRN
Status: DISCONTINUED | OUTPATIENT
Start: 2023-07-09 | End: 2023-07-11

## 2023-07-09 RX ORDER — HYDROXYZINE HYDROCHLORIDE 25 MG/1
100 TABLET, FILM COATED ORAL
Status: CANCELLED | OUTPATIENT
Start: 2023-07-09

## 2023-07-09 RX ORDER — LANOLIN ALCOHOL/MO/W.PET/CERES
3 CREAM (GRAM) TOPICAL
Status: CANCELLED | OUTPATIENT
Start: 2023-07-09

## 2023-07-09 RX ORDER — BENZTROPINE MESYLATE 1 MG/ML
1 INJECTION INTRAMUSCULAR; INTRAVENOUS
Status: CANCELLED | OUTPATIENT
Start: 2023-07-09

## 2023-07-09 RX ORDER — BENZTROPINE MESYLATE 1 MG/1
1 TABLET ORAL
Status: CANCELLED | OUTPATIENT
Start: 2023-07-09

## 2023-07-09 RX ORDER — DIPHENHYDRAMINE HYDROCHLORIDE 50 MG/ML
50 INJECTION INTRAMUSCULAR; INTRAVENOUS EVERY 6 HOURS PRN
Status: CANCELLED | OUTPATIENT
Start: 2023-07-09

## 2023-07-09 RX ORDER — HYDROXYZINE HYDROCHLORIDE 25 MG/1
25 TABLET, FILM COATED ORAL
Status: DISCONTINUED | OUTPATIENT
Start: 2023-07-09 | End: 2023-07-20 | Stop reason: HOSPADM

## 2023-07-09 RX ORDER — MAGNESIUM HYDROXIDE/ALUMINUM HYDROXICE/SIMETHICONE 120; 1200; 1200 MG/30ML; MG/30ML; MG/30ML
30 SUSPENSION ORAL EVERY 4 HOURS PRN
Status: DISCONTINUED | OUTPATIENT
Start: 2023-07-09 | End: 2023-07-20 | Stop reason: HOSPADM

## 2023-07-09 RX ORDER — PROPRANOLOL HYDROCHLORIDE 20 MG/1
10 TABLET ORAL EVERY 8 HOURS PRN
Status: CANCELLED | OUTPATIENT
Start: 2023-07-09

## 2023-07-09 RX ORDER — OLANZAPINE 2.5 MG/1
2.5 TABLET ORAL
Status: DISCONTINUED | OUTPATIENT
Start: 2023-07-09 | End: 2023-07-20 | Stop reason: HOSPADM

## 2023-07-09 RX ORDER — BENZTROPINE MESYLATE 1 MG/ML
1 INJECTION INTRAMUSCULAR; INTRAVENOUS
Status: DISCONTINUED | OUTPATIENT
Start: 2023-07-09 | End: 2023-07-20 | Stop reason: HOSPADM

## 2023-07-09 RX ORDER — AMOXICILLIN 250 MG
1 CAPSULE ORAL DAILY PRN
Status: DISCONTINUED | OUTPATIENT
Start: 2023-07-09 | End: 2023-07-20 | Stop reason: HOSPADM

## 2023-07-09 RX ORDER — IBUPROFEN 600 MG/1
600 TABLET ORAL EVERY 6 HOURS PRN
Status: DISCONTINUED | OUTPATIENT
Start: 2023-07-09 | End: 2023-07-20 | Stop reason: HOSPADM

## 2023-07-09 RX ORDER — HYDROXYZINE HYDROCHLORIDE 25 MG/1
50 TABLET, FILM COATED ORAL
Status: CANCELLED | OUTPATIENT
Start: 2023-07-09

## 2023-07-09 RX ORDER — LANOLIN ALCOHOL/MO/W.PET/CERES
3 CREAM (GRAM) TOPICAL
Status: DISCONTINUED | OUTPATIENT
Start: 2023-07-09 | End: 2023-07-14

## 2023-07-09 RX ORDER — OLANZAPINE 10 MG/1
2.5 INJECTION, POWDER, LYOPHILIZED, FOR SOLUTION INTRAMUSCULAR
Status: CANCELLED | OUTPATIENT
Start: 2023-07-09

## 2023-07-09 RX ORDER — AMOXICILLIN 250 MG
1 CAPSULE ORAL DAILY PRN
Status: CANCELLED | OUTPATIENT
Start: 2023-07-09

## 2023-07-09 RX ORDER — OLANZAPINE 10 MG/1
5 INJECTION, POWDER, LYOPHILIZED, FOR SOLUTION INTRAMUSCULAR
Status: CANCELLED | OUTPATIENT
Start: 2023-07-09

## 2023-07-09 RX ORDER — OLANZAPINE 2.5 MG/1
5 TABLET ORAL
Status: CANCELLED | OUTPATIENT
Start: 2023-07-09

## 2023-07-09 RX ORDER — BISACODYL 10 MG
10 SUPPOSITORY, RECTAL RECTAL DAILY PRN
Status: DISCONTINUED | OUTPATIENT
Start: 2023-07-09 | End: 2023-07-10

## 2023-07-09 RX ORDER — LORAZEPAM 2 MG/ML
2 INJECTION INTRAMUSCULAR EVERY 6 HOURS PRN
Status: DISCONTINUED | OUTPATIENT
Start: 2023-07-09 | End: 2023-07-20 | Stop reason: HOSPADM

## 2023-07-09 RX ORDER — HYDROXYZINE HYDROCHLORIDE 25 MG/1
25 TABLET, FILM COATED ORAL
Status: CANCELLED | OUTPATIENT
Start: 2023-07-09

## 2023-07-09 RX ORDER — PROPRANOLOL HYDROCHLORIDE 10 MG/1
10 TABLET ORAL EVERY 8 HOURS PRN
Status: DISCONTINUED | OUTPATIENT
Start: 2023-07-09 | End: 2023-07-20 | Stop reason: HOSPADM

## 2023-07-09 RX ORDER — OLANZAPINE 2.5 MG/1
2.5 TABLET ORAL
Status: CANCELLED | OUTPATIENT
Start: 2023-07-09

## 2023-07-09 RX ORDER — POLYETHYLENE GLYCOL 3350 17 G/17G
17 POWDER, FOR SOLUTION ORAL DAILY PRN
Status: CANCELLED | OUTPATIENT
Start: 2023-07-09

## 2023-07-09 RX ORDER — MAGNESIUM HYDROXIDE/ALUMINUM HYDROXICE/SIMETHICONE 120; 1200; 1200 MG/30ML; MG/30ML; MG/30ML
30 SUSPENSION ORAL EVERY 4 HOURS PRN
Status: CANCELLED | OUTPATIENT
Start: 2023-07-09

## 2023-07-09 RX ORDER — OLANZAPINE 10 MG/1
2.5 INJECTION, POWDER, LYOPHILIZED, FOR SOLUTION INTRAMUSCULAR
Status: DISCONTINUED | OUTPATIENT
Start: 2023-07-09 | End: 2023-07-20 | Stop reason: HOSPADM

## 2023-07-09 RX ORDER — LOSARTAN POTASSIUM 25 MG/1
25 TABLET ORAL 2 TIMES DAILY
Status: DISCONTINUED | OUTPATIENT
Start: 2023-07-09 | End: 2023-07-10

## 2023-07-09 RX ORDER — OLANZAPINE 10 MG/1
5 INJECTION, POWDER, LYOPHILIZED, FOR SOLUTION INTRAMUSCULAR
Status: DISCONTINUED | OUTPATIENT
Start: 2023-07-09 | End: 2023-07-20 | Stop reason: HOSPADM

## 2023-07-09 RX ADMIN — LOSARTAN POTASSIUM 25 MG: 25 TABLET, FILM COATED ORAL at 21:11

## 2023-07-09 NOTE — ED NOTES
Pt provided a hot meal and beverage. Aware of need for urine. Presently offers no complaints or needs. Awaiting Dr Vinny Lorenzo to complete 201.       Bryce Medina RN  07/09/23 6086

## 2023-07-09 NOTE — ED NOTES
[facility]  Patient is accepted by Dr. Howard Clancy. Patient may go to the floor at anytime          Nurse report is to be called to [phone]   prior to patient transfer.

## 2023-07-09 NOTE — EMTALA/ACUTE CARE TRANSFER
[address] [facility]  [address] [facility] [address]  Dept: [phone]      EMTALA TRANSFER CONSENT    NAME [address] [facility]                                         1970                              MRN [id number]    I have been informed of my rights regarding examination, treatment, and transfer   by Dr. Huey Green DO    Benefits: Specialized equipment and/or services available at the receiving facility (Include comment)________________________    Risks: Potential for delay in receiving treatment      Consent for Transfer:  I acknowledge that my medical condition has been evaluated and explained to me by the emergency department physician or other qualified medical person and/or my attending physician, who has recommended that I be transferred to the service of  Accepting Physician: Santos at [address] Name, [address] [facility] [address] : [address]. The above potential benefits of such transfer, the potential risks associated with such transfer, and the probable risks of not being transferred have been explained to me, and I fully understand them. The doctor has explained that, in my case, the benefits of transfer outweigh the risks. I agree to be transferred. I authorize the performance of emergency medical procedures and treatments upon me in both transit and upon arrival at the receiving facility. Additionally, I authorize the release of any and all medical records to the receiving facility and request they be transported with me, if possible. I understand that the safest mode of transportation during a medical emergency is an ambulance and that the Hospital advocates the use of this mode of transport. Risks of traveling to the receiving facility by car, including absence of medical control, life sustaining equipment, such as oxygen, and medical personnel has been explained to me and I fully understand them.     (KENDALL CORRECT BOX BELOW)  [x  ]  I consent to the stated transfer and to be transported by ambulance/helicopter. [  ]  I consent to the stated transfer, but refuse transportation by ambulance and accept full responsibility for my transportation by car. I understand the risks of non-ambulance transfers and I exonerate the Hospital and its staff from any deterioration in my condition that results from this refusal.    X___________________________________________    DATE  23  TIME________  Signature of patient or legally responsible individual signing on patient behalf           RELATIONSHIP TO PATIENT_________________________          Provider Certification    NAME Roddy Sal                                         1970                              MRN [id number]    A medical screening exam was performed on the above named patient. Based on the examination:    Condition Necessitating Transfer The primary encounter diagnosis was Depression. A diagnosis of Suicidal ideations was also pertinent to this visit. Patient Condition: The patient has been stabilized such that within reasonable medical probability, no material deterioration of the patient condition or the condition of the unborn child(gene) is likely to result from the transfer    Reason for Transfer: Level of Care needed not available at this facility    Transfer Requirements: [address] [address]   · Space available and qualified personnel available for treatment as acknowledged by    · Agreed to accept transfer and to provide appropriate medical treatment as acknowledged by       Santos  · Appropriate medical records of the examination and treatment of the patient are provided at the time of transfer   [address] _______  · Transfer will be performed by qualified personnel from    and appropriate transfer equipment as required, including the use of necessary and appropriate life support measures.     Provider Certification: I have examined the patient and explained the following risks and benefits of being transferred/refusing transfer to the patient/family:  General risk, such as traffic hazards, adverse weather conditions, rough terrain or turbulence, possible failure of equipment (including vehicle or aircraft), or consequences of actions of persons outside the control of the transport personnel      Based on these reasonable risks and benefits to the patient and/or the unborn child(gene), and based upon the information available at the time of the patient’s examination, I certify that the medical benefits reasonably to be expected from the provision of appropriate medical treatments at another medical facility outweigh the increasing risks, if any, to the individual’s medical condition, and in the case of labor to the unborn child, from effecting the transfer.     X____________________________________________ DATE 07/09/23        TIME_______      ORIGINAL - SEND TO MEDICAL RECORDS   COPY - SEND WITH PATIENT DURING TRANSFER

## 2023-07-09 NOTE — ED NOTES
Son at the bedside. Pt is having son take her keys home with him.      Vita Mendoza RN  07/09/23 3572

## 2023-07-09 NOTE — ED NOTES
Called placed to [facility] for evaluation, will have a counselor call back when available.       Jair Maciel RN  07/08/23 4400

## 2023-07-09 NOTE — ED NOTES
Transportation is arranged with [facility]   Transportation is scheduled for 6:45pm   Patient may go to the floor at anytime        Nurse report is to be called to [phone] prior to patient transfer.

## 2023-07-09 NOTE — ED NOTES
Pt informed of the results   Mailed the lab order to pt home address.    Referral sent to central intake

## 2023-07-09 NOTE — ED NOTES
Pt medically cleared by Dr Tarah Trinh. Moved to secure hold by security. Oriented to environment. Aware of need for urine specimen. No needs at present time.       Tatiana Delaney RN  07/08/23 2032

## 2023-07-09 NOTE — ED NOTES
Insurance Authorization for admission:   Phone call placed to [facility]. Phone number: [phone]. Spoke to [place].     5 days approved. Level of care: Inpatient . Review on 7/13/2023. Authorization # [id number]. Coverage ends on 7/31/2023        EVS (Eligibility Verification System) called - [phone]. Automated system indicates: **    Insurance Authorization for Transportation:    Phone call placed to **. Phone number **. Spoke to **.    Authorization #: **

## 2023-07-09 NOTE — CONSULTS
TeleConsultation - [address] 47 y.o. female MRN: [id number]  Unit/Bed#: 1161 [facility] Rashmi Mancini 02 Encounter: [id number]        REQUIRED DOCUMENTATION:     1. This service was provided via Telemedicine. 2. Provider located at [place].  3. TeleMed provider: Shukri Cheek MD.  4. Identify all parties in room with patient during tele consult:  patient  5. Patient was then informed that this was a Telemedicine visit and that the exam was being conducted confidentially over secure lines. My office door was closed. No one else was in the room. Patient acknowledged consent and understanding of privacy and security of the Telemedicine visit, and gave us permission to have the assistant stay in the room in order to assist with the history and to conduct the exam.  I informed the patient that I have reviewed their record in Epic and presented the opportunity for them to ask any questions regarding the visit today. The patient agreed to participate. Assessment/Plan     Present on Admission:  **None**    Assessment:  Ankit Juarez is a 71-year-old female, domiciled alone, currently employed at [employer], with past psychiatric history of anxiety and depressed mood as well as "suicidal tendencies" with no prior psychiatric hospitalizations, no substance use, family history of suicide who presents with suicidal ideation without intent or plan in the context of various social and financial stressors. Ankit Juarez states that she is concerned about returning home with current ideations and no medication management. She is requesting admission at this time. Major Depressive Disorder, single, severe without psychotic features  Generalized Anxiety Disorder    Treatment Plan:  Patient meets criteria for inpatient psychiatric hospitalization and may sign voluntarily. Patient verbalizes suicidal ideation but feels safe in the hospital setting. She denies any current plan or intent. Routine observation is acceptable.   Patient would like to restart her home medications. She has been compliant for 1 week due to limited access. Planned Medication Changes:    Recommend restarting BuSpar 5 mg by mouth twice a day for anxiety  Recommend restarting Sertraline 25 mg by mouth once daily for anxiety and depressed mood    Current Medications:             Risks / Benefits of Treatment:    Risks, benefits, and possible side effects of medications explained to patient and patient verbalizes understanding. Other treatment modalities recommended as indicated:    · None applicable at this time      Consults  Physician Requesting Consult: Benito Barajas DO  Principal Problem:<principal problem not specified>    Reason for Consult: suicidal ideation without plan      History of Present Illness      Diabetes has experienced suicidal ideation and worsening of symptoms over the past 2 weeks. She reports anxiety from financial stressors such as losing her healthcare insurance and "living check to check."  She states that she has been avoiding doctors appointments and refilling her much-needed medications because of fear of financial responsibility. Because she has been noncompliant with her medications she has seen an increase in the depressed mood and anxiety, increase in fatigue and anhedonia, and suicidal ideation. She is able to name her support system which consists of her son that lives 4 miles away and various family members, however she still is concerned about returning home and maintaining her safety. She denies suicidal intent and plan, denies auditory and visual hallucinations, and denies active/passive homicidal ideation intent and plan. She has experienced losses in her family from completed suicide which includes her brother and her cousin. Received medication management for depressed mood and  Struggled with substance use.      Psychiatric Review Of Systems:    sleep: yes, impaired  appetite changes: no  weight changes: no  energy/anergy: yes, impaired  interest/pleasure/anhedonia: no  somatic symptoms: no  anxiety/panic: yes  awa: no  guilty/hopeless: no  self injurious behavior/risky behavior: no    Historical Information     Past Psychiatric History:     Psychiatric Hospitalizations:   • No history of past inpatient psychiatric admissions  Outpatient Treatment History:   • Outpatient psychiatrist  Suicide Attempts:   • None reported by patient however as per records attempted to overdose on Tylenol in 2021. This was reported to family medicine physician patient did not undergo hospitalization with  H  •   History of self-harm:   • None  Violence History:   • no  Past Psychiatric medication trials: BuSpar and sertraline    Substance Abuse History:    Denies    Family Psychiatric History:      Brother and cousin completed suicide    Social History:    Currently works at [employer] and lives alone has 1 son that lives 4 miles away  Social History     Socioeconomic History   • Marital status:       Spouse name: Not on file   • Number of children: Not on file   • Years of education: Not on file   • Highest education level: Not on file   Occupational History   • Not on file   Tobacco Use   • Smoking status: Never   • Smokeless tobacco: Never   Vaping Use   • Vaping Use: Never used   Substance and Sexual Activity   • Alcohol use: Never   • Drug use: Never   • Sexual activity: Never   Other Topics Concern   • Not on file   Social History Narrative   • Not on file     Social Determinants of Health     Financial Resource Strain: Not on file   Food Insecurity: Not on file   Transportation Needs: Not on file   Physical Activity: Not on file   Stress: Not on file   Social Connections: Not on file   Intimate Partner Violence: Not on file   Housing Stability: Not on file       Traumatic History:     Abuse: Did not report  Other Traumatic Events: Losing state health care    Past Medical History:   Diagnosis Date   • Coronary artery disease    • Disease of thyroid gland    • Hypertension    • MI (myocardial infarction) ([address])    • Suicide ([address])     Attempt to OD on tylenol in 2021       Medical Review Of Systems:    Review of Systems    Meds/Allergies     all current active meds have been reviewed  Allergies   Allergen Reactions   • Acetaminophen Other (See Comments)     Pt attempted to OD on this med in the past and doesn't want to take it ever again       Objective     Vital signs in last 24 hours:  Temp:  [98.4 °F (36.9 °C)-99.2 °F (37.3 °C)] 98.4 °F (36.9 °C)  HR:  [69-74] 71  Resp:  [16-17] 17  BP: (128-200)/() 147/92    No intake or output data in the 24 hours ending 07/09/23 1401    Mental Status Evaluation:    Appearance:  age appropriate   Behavior:  normal   Speech:  normal pitch and normal volume   Mood:  anxious   Affect:  mood-congruent and Anxiety and depressed mood   Language: naming objects   Thought Process:  normal and [place]   Associations intact associations   Thought Content:  normal   Perceptual Disturbances: None   Risk Potential: Suicidal Ideations without plan  Homicidal Ideations none  Potential for Aggression No   Sensorium:  person, place, time/date and situation   Cognition:  recent and remote memory grossly intact   Consciousness:  alert and awake    Attention: attention span and concentration were age appropriate   Intellect: not examined   Fund of Knowledge: awareness of current events: wnl   Insight:  age appropriate   Judgment: age appropriate   Muscle Strength:  Muscle Tone: Not formally tested  Not formally tested   Gait/Station: Not observed   Motor Activity: no abnormal movements       Lab Results: I have personally reviewed all pertinent laboratory/tests results.      Most Recent Labs:   Lab Results   Component Value Date    WBC 6.32 07/08/2023    RBC 5.06 07/08/2023    HGB 15.1 07/08/2023    HCT 45.2 07/08/2023     07/08/2023    RDW 12.4 07/08/2023    NEUTROABS 3.08 07/08/2023    SODIUM 142 2023    K 3.6 2023     2023    CO2 27 2023    BUN 17 2023    CREATININE 0.94 2023    GLUC 111 2023    CALCIUM 9.4 2023    AST 21 10/18/2020    ALT 25 10/18/2020    ALKPHOS 80 10/18/2020    TP 7.6 10/18/2020    ALB 3.5 10/18/2020    TBILI 0.42 10/18/2020    CHOLESTEROL 214 (H) 10/18/2020    HDL 47 10/18/2020    TRIG 121 10/18/2020    LDLCALC 143 (H) 10/18/2020    [address] 167 10/18/2020    XWB4KSRKRZII 21.909 (H) 10/19/2020    FREET4 0.59 (L) 10/19/2020    PREGSERUM Negative 10/18/2020    HCGQUANT 1 2023    HGBA1C 5.6 2021     2021     Labs in last 72 hours:   Recent Labs     23  1940   WBC 6.32   RBC 5.06   HGB 15.1   HCT 45.2      RDW 12.4   NEUTROABS 3.08   SODIUM 142   K 3.6      CO2 27   BUN 17   CREATININE 0.94   GLUC 111   CALCIUM 9.4   HCGQUANT 1       Imaging Studies: No results found. EKG/Pathology/Other Studies:   Lab Results   Component Value Date    VENTRATE 69 2023    ATRIALRATE 69 2023    PRINT 156 2023    QRSDINT 80 2023    QTINT 420 2023    QTCINT 450 2023    PAXIS 56 2023    QRSAXIS -11 2023    TWAVEAXIS 5 2023        Code Status: Prior  Advance Directive and Living Will:      Power of :    POLST:      Screenings:    1. Nutrition Screening  · Not applicable    2. Pain Screening  Pain Level - none. Pain Scale - 0    3. Suicide Screening  Suicide Risk Assessment completed by the Consultant: The following ratings are based on assessment at the time of the interview. Demographic risk factors include: , age: over 48 or older. Historical Risk Factors include: history of depression, history of anxiety, history of suicide attempt, a relative or close friend who  by suicide. Recent Specific Risk Factors include: has suicidal ideation without a plan.  Protective Factors: being a parent, no substance use problems, sense of importance of health and wellness, supportive friends. Based on today's assessment, Telma Cortez presents the following risk of harm to self: moderate. Counseling / Coordination of Care: Total floor / unit time spent today 60 minutes. Greater than 50% of total time was spent with the patient and / or family counseling and / or coordination of care.  A description of the counseling / coordination of care: Chart review, direct patient care, handoff to primary team

## 2023-07-10 PROBLEM — E03.9 HYPOTHYROIDISM: Status: ACTIVE | Noted: 2023-07-10

## 2023-07-10 PROBLEM — E78.5 HYPERLIPIDEMIA: Status: ACTIVE | Noted: 2023-07-10

## 2023-07-10 PROBLEM — E44.0 MODERATE PROTEIN-CALORIE MALNUTRITION (HCC): Status: ACTIVE | Noted: 2023-07-10

## 2023-07-10 PROBLEM — F33.9 RECURRENT MAJOR DEPRESSIVE DISORDER (HCC): Status: ACTIVE | Noted: 2023-07-10

## 2023-07-10 PROBLEM — F41.9 ANXIETY: Status: ACTIVE | Noted: 2023-07-10

## 2023-07-10 PROBLEM — Z00.8 MEDICAL CLEARANCE FOR PSYCHIATRIC ADMISSION: Status: ACTIVE | Noted: 2023-07-10

## 2023-07-10 PROBLEM — I10 HYPERTENSION: Status: ACTIVE | Noted: 2023-07-10

## 2023-07-10 LAB
25(OH)D3 SERPL-MCNC: 46.8 NG/ML (ref 30–100)
ALBUMIN SERPL BCP-MCNC: 4.5 G/DL (ref 3.5–5)
ALP SERPL-CCNC: 67 U/L (ref 34–104)
ALT SERPL W P-5'-P-CCNC: 8 U/L (ref 7–52)
ANION GAP SERPL CALCULATED.3IONS-SCNC: 7 MMOL/L
AST SERPL W P-5'-P-CCNC: 15 U/L (ref 13–39)
BASOPHILS # BLD AUTO: 0.04 THOUSANDS/ÂΜL (ref 0–0.1)
BASOPHILS NFR BLD AUTO: 1 % (ref 0–1)
BILIRUB SERPL-MCNC: 0.51 MG/DL (ref 0.2–1)
BUN SERPL-MCNC: 19 MG/DL (ref 5–25)
CALCIUM SERPL-MCNC: 9.4 MG/DL (ref 8.4–10.2)
CHLORIDE SERPL-SCNC: 105 MMOL/L (ref 96–108)
CHOLEST SERPL-MCNC: 171 MG/DL
CO2 SERPL-SCNC: 28 MMOL/L (ref 21–32)
CREAT SERPL-MCNC: 0.7 MG/DL (ref 0.6–1.3)
EOSINOPHIL # BLD AUTO: 0.26 THOUSAND/ÂΜL (ref 0–0.61)
EOSINOPHIL NFR BLD AUTO: 4 % (ref 0–6)
ERYTHROCYTE [DISTWIDTH] IN BLOOD BY AUTOMATED COUNT: 12.3 % (ref 11.6–15.1)
GFR SERPL CREATININE-BSD FRML MDRD: 100 ML/MIN/1.73SQ M
GLUCOSE P FAST SERPL-MCNC: 89 MG/DL (ref 65–99)
GLUCOSE SERPL-MCNC: 89 MG/DL (ref 65–140)
HCT VFR BLD AUTO: 44.3 % (ref 34.8–46.1)
HDLC SERPL-MCNC: 52 MG/DL
HGB BLD-MCNC: 14.6 G/DL (ref 11.5–15.4)
IMM GRANULOCYTES # BLD AUTO: 0.03 THOUSAND/UL (ref 0–0.2)
IMM GRANULOCYTES NFR BLD AUTO: 1 % (ref 0–2)
LDLC SERPL CALC-MCNC: 94 MG/DL (ref 0–100)
LYMPHOCYTES # BLD AUTO: 2.2 THOUSANDS/ÂΜL (ref 0.6–4.47)
LYMPHOCYTES NFR BLD AUTO: 34 % (ref 14–44)
MCH RBC QN AUTO: 29.5 PG (ref 26.8–34.3)
MCHC RBC AUTO-ENTMCNC: 33 G/DL (ref 31.4–37.4)
MCV RBC AUTO: 90 FL (ref 82–98)
MONOCYTES # BLD AUTO: 0.44 THOUSAND/ÂΜL (ref 0.17–1.22)
MONOCYTES NFR BLD AUTO: 7 % (ref 4–12)
NEUTROPHILS # BLD AUTO: 3.51 THOUSANDS/ÂΜL (ref 1.85–7.62)
NEUTS SEG NFR BLD AUTO: 53 % (ref 43–75)
NONHDLC SERPL-MCNC: 119 MG/DL
NRBC BLD AUTO-RTO: 0 /100 WBCS
PLATELET # BLD AUTO: 272 THOUSANDS/UL (ref 149–390)
PMV BLD AUTO: 9.8 FL (ref 8.9–12.7)
POTASSIUM SERPL-SCNC: 4 MMOL/L (ref 3.5–5.3)
PROT SERPL-MCNC: 7.7 G/DL (ref 6.4–8.4)
RBC # BLD AUTO: 4.95 MILLION/UL (ref 3.81–5.12)
SODIUM SERPL-SCNC: 140 MMOL/L (ref 135–147)
T4 FREE SERPL-MCNC: 0.85 NG/DL (ref 0.61–1.12)
TREPONEMA PALLIDUM IGG+IGM AB [PRESENCE] IN SERUM OR PLASMA BY IMMUNOASSAY: NORMAL
TRIGL SERPL-MCNC: 124 MG/DL
TSH SERPL DL<=0.05 MIU/L-ACNC: 12.26 UIU/ML (ref 0.45–4.5)
VIT B12 SERPL-MCNC: 231 PG/ML (ref 180–914)
WBC # BLD AUTO: 6.48 THOUSAND/UL (ref 4.31–10.16)

## 2023-07-10 PROCEDURE — 84439 ASSAY OF FREE THYROXINE: CPT | Performed by: PSYCHIATRY & NEUROLOGY

## 2023-07-10 PROCEDURE — 86780 TREPONEMA PALLIDUM: CPT | Performed by: PSYCHIATRY & NEUROLOGY

## 2023-07-10 PROCEDURE — 99222 1ST HOSP IP/OBS MODERATE 55: CPT

## 2023-07-10 PROCEDURE — 80061 LIPID PANEL: CPT | Performed by: PSYCHIATRY & NEUROLOGY

## 2023-07-10 PROCEDURE — 99223 1ST HOSP IP/OBS HIGH 75: CPT | Performed by: STUDENT IN AN ORGANIZED HEALTH CARE EDUCATION/TRAINING PROGRAM

## 2023-07-10 PROCEDURE — 80053 COMPREHEN METABOLIC PANEL: CPT | Performed by: PSYCHIATRY & NEUROLOGY

## 2023-07-10 PROCEDURE — 84443 ASSAY THYROID STIM HORMONE: CPT | Performed by: PSYCHIATRY & NEUROLOGY

## 2023-07-10 PROCEDURE — 82306 VITAMIN D 25 HYDROXY: CPT | Performed by: PSYCHIATRY & NEUROLOGY

## 2023-07-10 PROCEDURE — 82607 VITAMIN B-12: CPT | Performed by: PSYCHIATRY & NEUROLOGY

## 2023-07-10 PROCEDURE — 85025 COMPLETE CBC W/AUTO DIFF WBC: CPT | Performed by: PSYCHIATRY & NEUROLOGY

## 2023-07-10 RX ORDER — BUSPIRONE HYDROCHLORIDE 5 MG/1
5 TABLET ORAL 2 TIMES DAILY
Status: DISCONTINUED | OUTPATIENT
Start: 2023-07-10 | End: 2023-07-20 | Stop reason: HOSPADM

## 2023-07-10 RX ORDER — BISACODYL 10 MG
10 SUPPOSITORY, RECTAL RECTAL DAILY PRN
Status: DISCONTINUED | OUTPATIENT
Start: 2023-07-10 | End: 2023-07-20 | Stop reason: HOSPADM

## 2023-07-10 RX ORDER — LOSARTAN POTASSIUM 50 MG/1
50 TABLET ORAL DAILY
Status: DISCONTINUED | OUTPATIENT
Start: 2023-07-11 | End: 2023-07-19

## 2023-07-10 RX ORDER — SERTRALINE HYDROCHLORIDE 25 MG/1
25 TABLET, FILM COATED ORAL DAILY
Status: DISCONTINUED | OUTPATIENT
Start: 2023-07-10 | End: 2023-07-20 | Stop reason: HOSPADM

## 2023-07-10 RX ORDER — POLYETHYLENE GLYCOL 3350 17 G/17G
17 POWDER, FOR SOLUTION ORAL DAILY PRN
Status: DISCONTINUED | OUTPATIENT
Start: 2023-07-10 | End: 2023-07-20 | Stop reason: HOSPADM

## 2023-07-10 RX ORDER — LEVOTHYROXINE SODIUM 0.07 MG/1
75 TABLET ORAL
Status: DISCONTINUED | OUTPATIENT
Start: 2023-07-10 | End: 2023-07-20 | Stop reason: HOSPADM

## 2023-07-10 RX ORDER — NAPROXEN 500 MG/1
500 TABLET ORAL ONCE
Status: COMPLETED | OUTPATIENT
Start: 2023-07-10 | End: 2023-07-10

## 2023-07-10 RX ORDER — ATORVASTATIN CALCIUM 40 MG/1
40 TABLET, FILM COATED ORAL
Status: DISCONTINUED | OUTPATIENT
Start: 2023-07-10 | End: 2023-07-20 | Stop reason: HOSPADM

## 2023-07-10 RX ADMIN — NAPROXEN 500 MG: 500 TABLET ORAL at 19:25

## 2023-07-10 RX ADMIN — BUSPIRONE HYDROCHLORIDE 5 MG: 5 TABLET ORAL at 17:25

## 2023-07-10 RX ADMIN — SERTRALINE HYDROCHLORIDE 25 MG: 25 TABLET ORAL at 09:59

## 2023-07-10 RX ADMIN — LOSARTAN POTASSIUM 25 MG: 25 TABLET, FILM COATED ORAL at 09:59

## 2023-07-10 RX ADMIN — BUSPIRONE HYDROCHLORIDE 5 MG: 5 TABLET ORAL at 09:59

## 2023-07-10 RX ADMIN — ATORVASTATIN CALCIUM 40 MG: 40 TABLET, FILM COATED ORAL at 17:25

## 2023-07-10 NOTE — NURSING NOTE
Patient reports having a "good day." She attended groups and walked the halls today. She requested a journal and plans on writing every day even when she goes home. Denies any SI/HI/ and hallucinations.

## 2023-07-10 NOTE — DISCHARGE INSTR - OTHER ORDERS
Crisis Intervention services are available 24 hours a day   Emergency Crisis Services: [phone]  After hours (4:30 p.m.-8:30 a.m. and weekends/holidays):  [phone] or [phone]  Emergency crisis services are available 24 hours a day, 7 days a week, and 365 days a year. Walk-ins go to the rear of [address] [place] [address]. (behind [address] [facility] on [address] ). Text CONNECT to 873547 from anywhere in the [facility], anytime, about any type of crisis. A live, trained Crisis Counselor receives the text and lets you know that they are here to listen. The volunteer Crisis Counselor will help you move from a hot moment to a cool moment. The [facility] Mental [facility]ness ([facility]) offers various education & support groups for you & your family. For more information visit their website at    http://www.[employer]esk/.      Dial 2-1-1 to get connected/get help. Free, confidential information & referral available 24/7: Aging Services, Child & Youth Services, Counseling, Education/Training, Food/Shelter/Clothing, Health Services, Parenting, Substance Abuse, Support Groups, Volunteer Opportunities, & much more. Phone: 2-1-1 or 811-565-9910, Web: PHZ.JT640RYIV.[facility], Email: Mario@BitAnimate      Support & Referral Helpline   Support and Referral Helpline is a 24-hour, 7 days-a-week, listening and referral service provided to all of [place]. Please call [phone] or tty [phone] to speak with one of our specialists. The helpline is possible through partnerships with the [facility] [place]  [employer]. The [address] [place] [address] (formerly known as the [employer]) provides free and confidential emotional support to people in suicidal crisis or emotional distress 24 hours a day, 7 days a week, across the [facility].  The [employer] is comprised of a national network of over 200 local crisis centers, combining custom local care and resources with national standards and best practices.

## 2023-07-10 NOTE — PLAN OF CARE
Problem: Ineffective Coping  Goal: Identifies healthy coping skills  Outcome: Progressing     Problem: Depression  Goal: Refrain from isolation  Description: Interventions:  - Develop a trusting relationship   - Encourage socialization   Outcome: Progressing  Goal: Refrain from self-neglect  Outcome: Progressing  Goal: Complete daily ADLs, including personal hygiene independently, as able  Description: Interventions:  - Observe, teach, and assist patient with ADLS  -  Monitor and promote a balance of rest/activity, with adequate nutrition and elimination   Outcome: Progressing     Problem: Anxiety  Goal: Anxiety is at manageable level  Description: Interventions:  - Assess and monitor patient's anxiety level. - Monitor for signs and symptoms (heart palpitations, chest pain, shortness of breath, headaches, nausea, feeling jumpy, restlessness, irritable, apprehensive). - Collaborate with interdisciplinary team and initiate plan and interventions as ordered. - [place] patient to unit/surroundings  - Explain treatment plan  - Encourage participation in care  - Encourage verbalization of concerns/fears  - Identify coping mechanisms  - Assist in developing anxiety-reducing skills  - Administer/offer alternative therapies  - Limit or eliminate stimulants  Outcome: Progressing     Problem: Nutrition/Hydration-ADULT  Goal: Nutrient/Hydration intake appropriate for improving, restoring or maintaining nutritional needs  Description: Monitor and assess patient's nutrition/hydration status for malnutrition. Collaborate with interdisciplinary team and initiate plan and interventions as ordered. Monitor patient's weight and dietary intake as ordered or per policy. Utilize nutrition screening tool and intervene as necessary. Determine patient's food preferences and provide high-protein, high-caloric foods as appropriate.      INTERVENTIONS:  - Monitor oral intake, urinary output, labs, and treatment plans  - Assess nutrition and hydration status and recommend course of action  - Evaluate amount of meals eaten  - Assist patient with eating if necessary   - Allow adequate time for meals  - Recommend/ encourage appropriate diets, oral nutritional supplements, and vitamin/mineral supplements  - Order, calculate, and assess calorie counts as needed  - Recommend, monitor, and adjust tube feedings and TPN/PPN based on assessed needs  - Assess need for intravenous fluids  - Provide specific nutrition/hydration education as appropriate  - Include patient/family/caregiver in decisions related to nutrition  Outcome: Progressing

## 2023-07-10 NOTE — TREATMENT TEAM
07/10/23 1000   Activity/Group Checklist   Group Exercise   Attendance Attended   Attendance Duration (min) 16-30   Interactions Interacted appropriately   Affect/Mood Appropriate;Calm   Goals Achieved Able to listen to others; Able to engage in interactions; Identified feelings; Other (Comment)  (walked laps on the unit with peers and this therapist)

## 2023-07-10 NOTE — CMS CERTIFICATION NOTE
Recertification: Based upon physical, mental and social evaluations, I certify that inpatient psychiatric services continue to be medically necessary for this patient for a duration of 7 midnights for the treatment of  Recurrent major depressive disorder ([address])   Available alternative community resources still do not meet the patient's mental health care needs. I further attest that an established written individualized plan of care has been updated and is outlined in the patient's medical records.

## 2023-07-10 NOTE — NURSING NOTE
Pt pleasant during interaction. Reports increased anxiety related to anniversary date of  death on Wednesday. Pt misses grandchildren. Denies SI or negative thoughts. Denies HI or hallucinations. Pt educated on medication. Attending groups. Denies any question or concern.

## 2023-07-10 NOTE — ASSESSMENT & PLAN NOTE
• Admission labs reviewed, CBC, CMP, lipid panel, acceptable  • TSH elevated, T4 pending   • Vitals stable  • UDS negative  • COVID-19 negative  • EKG pending  • Medically stable for continued inpatient psychiatric treatment based on available results  • Please contact [facility] with any questions or concerns

## 2023-07-10 NOTE — TREATMENT PLAN
TREATMENT PLAN REVIEW - [address] [place] [address] 47 y.o. 1970 female MRN: [id number]    Inge Mancini Room / Bed: [place] [address]/[facility] Encounter: [id number]          Admit Date/Time:  7/9/2023  8:04 PM    Treatment Team: Attending Provider: Radha Cárdenas MD; Security: Haisharifastevenson David; Charge Nurse: Mich Chung RN; Registered Nurse: Zeynep Guerrero RN; Licensed Practical Nurse: Lissette Garcia LPN; Care Manager: James Arellano RN; Security: Anitra Lomas; Patient Care Assistant: Christopher Bravo; Licensed Practical Nurse: Armida Acevedo LPN; Charge Nurse: Ankit Olmstead, BJ; Registered Nurse: Tong Stewart RN; Patient Care Assistant: Jose Miguel Leroy    Diagnosis: Principal Problem:    Recurrent major depressive disorder [facility])  Active Problems:    Anxiety    Moderate protein-calorie malnutrition ([address])      Patient Strengths/Assets: cooperative, motivation for treatment/growth, patient is on a voluntary commitment    Patient Barriers/Limitations: financial instability, limited support system    Short Term Goals: decrease in depressive symptoms, decrease in anxiety symptoms, sleep improvement, improvement in appetite, mood stabilization    Long Term Goals: improvement in depression, improvement in anxiety, stabilization of mood, free of suicidal thoughts, improved insight, adequate self care, adequate sleep, adequate appetite    Progress Towards Goals: starting psychiatric medications as prescribed    Recommended Treatment: medication management, patient medication education, group therapy, milieu therapy, continued Behavioral Health psychiatric evaluation/assessment process    Treatment Frequency: daily medication monitoring, group and milieu therapy daily, monitoring through interdisciplinary rounds, monitoring through weekly patient care conferences    Expected Discharge Date:  5-7 days    Discharge Plan: referral for outpatient medication management with a psychiatrist, referral for outpatient psychotherapy    Treatment Plan Created/Updated By: Mary Jane Retana MD

## 2023-07-10 NOTE — H&P
Psychiatric Evaluation - [address] [facility] 47 y.o. female MRN: [id number]  Unit/Bed#: [facility] 211-01 Encounter: [id number]    Assessment/Plan   Principal Problem:    Recurrent major depressive disorder ([address] [facility])  Active Problems:    Anxiety    Plan:   Start Zoloft 25 mg Daily  Buspar 5 mg BID  Admit to [address] on 201 status for safety and treatment  No 1:1 continuous observation needed at this time, as patient feels safe on the unit. Check admission labs. Get collaterals. Collaborate with family for baseline assessment and disposition planning. Case discussed with treatment team.    Treatment options and alternatives were reviewed with the patient, who concurs with the above plan. Risks, benefits, and possible side effects of medications were explained to the patient, and she verbalizes understanding.      -----------------------------------    Chief Complaint: "iwanted to die"    History of Present Illness     Per ED provider on 7/8:"Thinking about suicide for 1 week, wrote multiple suicide notes. Pt reports " I came close the other night to committing suicide but didn't do it" Pt reports " I'm losing my health insurance, my work is stressful, I'm behind on  bills"."    This is 46 yo female with hx of depression/anxiety admitted to inpatient unit on voluntary status for worsening of mood and suicidal ideation with plan in the context of psychosocial stressors. Patient endorses multiple stressors. She has financial stressors, going to loose health insurance and death anniversary of her  coming up in this month. She endorses depressed mood, anhedonia, lack of motivation, poor concentration, fatigue and isolating self. She also been having suicidal thoughts, wrote suicide notes to family members and planned to overdose on pills. She did overdose in the past, 2 years ago.  She didn't wanted to do again, so aborted and brought herself to hospital.   Psychiatric Review Of Systems:  Medication side effects: none  Sleep: Poor  Appetite: Poor lost 10 pounds  Hygiene: able to tend to instrumental and basic ADLs  Anxiety and panic attacks: Yes  Psychotic Symptoms: denies  Depression Symptoms: Yes   Manic Symptoms: denies  PTSD Symptoms: denies  Suicidal Thoughts: denies  Homicidal Thoughts: denies    Medical Review Of Systems:   Complete ROS is negative, except as noted above. Historical Information     Psychiatric History:   Psychiatry diagnosis:depression  Inpatient Hx: Yes in the ED  Suicidal Hx:Yes OD  On pills  Self harming behavior Hx:  Violent behavior Hx:  Outpatient Hx: None  Medications/Trials: None Buspar sertraline    Substance Abuse History:  Denies  I spent time with Jodi in counseling and education on risk of substance abuse. I assessed motivation and encouraged her for treatment as appropriate. Family Psychiatric History:   Brother-shot himself/alcohol use  Alcohol runs in family    Social History:  Education: GED  Learning Disabilities:Denies  Marital history:    Living arrangement: Lives by herself  Occupational History: Employed  Functioning Relationships: Yes  Other Pertinent History:    Hx: Denies  Legal Hx:     Traumatic History:   None  Past Medical History:   HTN HLD Low B12 and  Low Vit D  History of Seizures: no  History of Head injury with loss of consciousness: no    Past Medical History:   Past Medical History:   Diagnosis Date   • Coronary artery disease    • Disease of thyroid gland    • Hypertension    • MI (myocardial infarction) ([address])    • Suicide ([address])     Attempt to OD on tylenol in 2021        -----------------------------------  Objective    Temp:  [97.9 °F (36.6 °C)-98.4 °F (36.9 °C)] 98.4 °F (36.9 °C)  HR:  [67-77] 71  Resp:  [16-18] 18  BP: (148-174)/() 148/105    Mental Status Evaluation:    Appearance:  age appropriate, disheveled and older than stated age   Behavior:  cooperative   Speech:  normal pitch and normal volume   Mood:  anxious and depressed   Affect:  constricted   Thought Process:  goal directed and logical   Thought Content:  normal   Perceptual Disturbances: None   Risk Potential: Suicidal Ideations none  Homicidal Ideations none  Potential for Aggression No   Sensorium:  person, place and time/date   Memory:  recent and remote memory grossly intact   Consciousness:  alert and awake    Attention: attention span appeared shorter than expected for age   Insight:  limited   Judgment: limited   Gait/Station: normal gait/station   Motor Activity: no abnormal movements       Meds/Allergies   Allergies   Allergen Reactions   • Acetaminophen Other (See Comments)     Pt attempted to OD on this med in the past and doesn't want to take it ever again     all current active meds have been reviewed    Behavioral Health Medications: all current active meds have been reviewed. Changes as above. Laboratory results:  I have personally reviewed all pertinent laboratory/tests results.   Recent Results (from the past 48 hour(s))   Ethanol    Collection Time: 07/08/23  7:40 PM   Result Value Ref Range    Ethanol Lvl <10 <10 mg/dL   CBC and differential    Collection Time: 07/08/23  7:40 PM   Result Value Ref Range    WBC 6.32 4.31 - 10.16 Thousand/uL    RBC 5.06 3.81 - 5.12 Million/uL    Hemoglobin 15.1 11.5 - 15.4 g/dL    Hematocrit 45.2 34.8 - 46.1 %    MCV 89 82 - 98 fL    MCH 29.8 26.8 - 34.3 pg    MCHC 33.4 31.4 - 37.4 g/dL    RDW 12.4 11.6 - 15.1 %    MPV 9.2 8.9 - 12.7 fL    Platelets 981 433 - 038 Thousands/uL    nRBC 0 /100 WBCs    Neutrophils Relative 49 43 - 75 %    Immat GRANS % 0 0 - 2 %    Lymphocytes Relative 38 14 - 44 %    Monocytes Relative 7 4 - 12 %    Eosinophils Relative 5 0 - 6 %    Basophils Relative 1 0 - 1 %    Neutrophils Absolute 3.08 1.85 - 7.62 Thousands/µL    Immature Grans Absolute 0.02 0.00 - 0.20 Thousand/uL    Lymphocytes Absolute 2.42 0.60 - 4.47 Thousands/µL    Monocytes Absolute 0.45 0.17 - 1.22 Thousand/µL Eosinophils Absolute 0.30 0.00 - 0.61 Thousand/µL    Basophils Absolute 0.05 0.00 - 0.10 Thousands/µL   Basic metabolic panel    Collection Time: 07/08/23  7:40 PM   Result Value Ref Range    Sodium 142 135 - 147 mmol/L    Potassium 3.6 3.5 - 5.3 mmol/L    Chloride 106 96 - 108 mmol/L    CO2 27 21 - 32 mmol/L    ANION GAP 9 mmol/L    BUN 17 5 - 25 mg/dL    Creatinine 0.94 0.60 - 1.30 mg/dL    Glucose 111 65 - 140 mg/dL    Calcium 9.4 8.4 - 10.2 mg/dL    eGFR 70 ml/min/1.73sq m   hCG, quantitative    Collection Time: 07/08/23  7:40 PM   Result Value Ref Range    HCG, Quant 1 0 - 5 mIU/mL   Rapid drug screen, urine    Collection Time: 07/09/23  6:06 AM   Result Value Ref Range    Amph/Meth UR Negative Negative    Barbiturate Ur Negative Negative    Benzodiazepine Urine Negative Negative    Cocaine Urine Negative Negative    Methadone Urine Negative Negative    Opiate Urine Negative Negative    PCP Ur Negative Negative    THC Urine Negative Negative    Oxycodone Urine Negative Negative   Comprehensive metabolic panel    Collection Time: 07/10/23  6:21 AM   Result Value Ref Range    Sodium 140 135 - 147 mmol/L    Potassium 4.0 3.5 - 5.3 mmol/L    Chloride 105 96 - 108 mmol/L    CO2 28 21 - 32 mmol/L    ANION GAP 7 mmol/L    BUN 19 5 - 25 mg/dL    Creatinine 0.70 0.60 - 1.30 mg/dL    Glucose 89 65 - 140 mg/dL    Glucose, Fasting 89 65 - 99 mg/dL    Calcium 9.4 8.4 - 10.2 mg/dL    AST 15 13 - 39 U/L    ALT 8 7 - 52 U/L    Alkaline Phosphatase 67 34 - 104 U/L    Total Protein 7.7 6.4 - 8.4 g/dL    Albumin 4.5 3.5 - 5.0 g/dL    Total Bilirubin 0.51 0.20 - 1.00 mg/dL    eGFR 100 ml/min/1.73sq m   CBC and differential    Collection Time: 07/10/23  6:21 AM   Result Value Ref Range    WBC 6.48 4.31 - 10.16 Thousand/uL    RBC 4.95 3.81 - 5.12 Million/uL    Hemoglobin 14.6 11.5 - 15.4 g/dL    Hematocrit 44.3 34.8 - 46.1 %    MCV 90 82 - 98 fL    MCH 29.5 26.8 - 34.3 pg    MCHC 33.0 31.4 - 37.4 g/dL    RDW 12.3 11.6 - 15.1 % MPV 9.8 8.9 - 12.7 fL    Platelets 778 822 - 057 Thousands/uL    nRBC 0 /100 WBCs    Neutrophils Relative 53 43 - 75 %    Immat GRANS % 1 0 - 2 %    Lymphocytes Relative 34 14 - 44 %    Monocytes Relative 7 4 - 12 %    Eosinophils Relative 4 0 - 6 %    Basophils Relative 1 0 - 1 %    Neutrophils Absolute 3.51 1.85 - 7.62 Thousands/µL    Immature Grans Absolute 0.03 0.00 - 0.20 Thousand/uL    Lymphocytes Absolute 2.20 0.60 - 4.47 Thousands/µL    Monocytes Absolute 0.44 0.17 - 1.22 Thousand/µL    Eosinophils Absolute 0.26 0.00 - 0.61 Thousand/µL    Basophils Absolute 0.04 0.00 - 0.10 Thousands/µL   TSH, 3rd generation with Free T4 reflex    Collection Time: 07/10/23  6:21 AM   Result Value Ref Range    TSH 3RD GENERATON 12.256 (H) 0.450 - 4.500 uIU/mL   Lipid panel    Collection Time: 07/10/23  6:21 AM   Result Value Ref Range    Cholesterol 171 See Comment mg/dL    Triglycerides 124 See Comment mg/dL    HDL, Direct 52 >=50 mg/dL    LDL Calculated 94 0 - 100 mg/dL    Non-HDL-Chol (CHOL-HDL) 119 mg/dl              -----------------------------------    Risks / Benefits of Treatment:     Risks, benefits, and possible side effects of medications explained to patient. The patient verbalizes understanding and agreement for treatment. Counseling / Coordination of Care:     Patient's presentation on admission and proposed treatment plan were discussed with the treatment team.  Diagnosis, medication changes and treatment plan were reviewed with the patient. Recent stressors were discussed with the patient. Events leading to admission were reviewed with the patient. Importance of medication and treatment compliance was reviewed with the patient.           Inpatient Psychiatric Certification:     Certification: Based upon physical, mental and social evaluations, I certify that inpatient psychiatric services are medically necessary for this patient for a duration of 7 midnights for the treatment of Recurrent major depressive disorder ([address] [facility])

## 2023-07-10 NOTE — NURSING NOTE
Jodi scored "Moderate Risk" on Lifetime C-SSRS. Dr. Shahram Dawson of [address] [place] [address] notified via [facility] Text. No new orders obtained at this time. Mariela Sosa denies current SI, endorses feeling of safety on the unit. Q7 minute observational rounds enacted for promotion of patient safety.

## 2023-07-10 NOTE — TREATMENT TEAM
07/10/23 1030   Activity/Group Checklist   Group Other (Comment)  (art therapy)   Attendance Attended   Attendance Duration (min) 46-60   Interactions Interacted appropriately   Affect/Mood Appropriate;Calm   Goals Achieved Able to listen to others; Able to engage in interactions; Other (Comment)  (authentic, spontaneous self expression,c onnection, and insight)

## 2023-07-10 NOTE — MALNUTRITION/BMI
This medical record reflects one or more clinical indicators suggestive of malnutrition and/or morbid obesity. Malnutrition Findings:   Adult Malnutrition type: Chronic illness  Adult Degree of Malnutrition: Malnutrition of moderate degree  Malnutrition Characteristics: Inadequate energy, Weight loss                  360 Statement: Pt presents with moderate protein calorie malnutrition as evidenced by 9% wt loss in 2 months (1/13/23 193 lbs, 7/10/23 175 lbs) and <75% po intake > 1 month. Treat with regular diet x 3 meals daily, if po intake less than 50% for greater than 2 meals daily can consider po supplements BID. BMI Findings: Body mass index is 30.07 kg/m². See Nutrition note dated 7/10/23  for additional details. Completed nutrition assessment is viewable in the nutrition documentation.

## 2023-07-10 NOTE — TREATMENT PLAN
TREATMENT PLAN REVIEW - [address] [place] [address] 47 y.o. 1970 female MRN: [id number]    Inge Mancini Room / Bed: [address] [address] Encounter: [id number]          Admit Date/Time:  7/9/2023  8:04 PM    Treatment Team: Attending Provider: Isabella Avila MD; Security: Mari Madera; Charge Nurse: Jessica Cifuentes RN; Registered Nurse: Ugo Costa RN; Licensed Practical Nurse: Emerson Abraham LPN; Care Manager: Judy Grider RN; Security: Juan J Harley; Patient Care Assistant: Leyla Groves; Licensed Practical Nurse: Nicolas Puckett LPN; Charge Nurse: Vivian Longo RN; Registered Nurse: Ck Gregorio RN; Patient Care Assistant: Laci Daigle    Diagnosis: Principal Problem:    Recurrent major depressive disorder [facility])  Active Problems:    Anxiety      Patient Strengths/Assets: cooperative, motivation for treatment/growth, patient is on a voluntary commitment    Patient Barriers/Limitations: financial instability, limited support system, poor support system    Short Term Goals: decrease in depressive symptoms, decrease in anxiety symptoms, decrease in suicidal thoughts, improvement in insight, sleep improvement, improvement in appetite, mood stabilization    Long Term Goals: improvement in depression, improvement in anxiety, resolution of depressive symptoms, stabilization of mood, free of suicidal thoughts, improved insight, adequate self care, adequate sleep, adequate appetite    Progress Towards Goals: starting psychiatric medications as prescribed    Recommended Treatment: medication management, patient medication education, group therapy, milieu therapy, continued Behavioral Health psychiatric evaluation/assessment process    Treatment Frequency: daily medication monitoring, group and milieu therapy daily, monitoring through interdisciplinary rounds, monitoring through weekly patient care conferences    Expected Discharge Date:  5-7 days    Discharge Plan: referral for outpatient medication management with a psychiatrist, referral for outpatient psychotherapy    Treatment Plan Created/Updated By: Jennifer Chou MD

## 2023-07-10 NOTE — NURSING NOTE
Vikki Wade is a 46 y/o female, here on a 201 from [facility]. Patient presented to the ED with +SI to O/D on pills. Vikki Wade has one prior suicide attempt in 2021 via O/D on Tylenol. Patient reports, "I'm losing my health insurance, and Wednesday is the eighth anniversary of my 's death." Vikki Wade reports work and financial stressors, as well. Patient reports decreased sleep and unintentional ten pound weight loss r/t financial strain. Vikki Wade reports she typically only eats one meal a day, as that is all she can afford. Patient has been non-compliant with medications r/t inability to pay for them. Vikki Wade wrote multiple suicide notes to her family and exhibits a depressed affect. Patient endorses history of thyroid disease, HTN, hypercholesterolemia, as well as bone spurs on bilateral heels. Patient's brother committed suicide in 2009. Vikki Wade lives alone, but reports her son lives four miles away. UDS: negative.

## 2023-07-10 NOTE — PROGRESS NOTES
Patient is a 201. Patient reported to ED due to SI. Patient reported multiple stressors in his life that have led to SI thoughts. UDS, PAWSS, AUDIT, and BAT reviewed. Discharge Date: TBD      07/10/23 0815   Team Meeting   Meeting Type Daily Rounds   Team Members Present   Team Members Present Physician;Nurse;; Other (Discipline and Name)   Physician Team Member Dr. Sue Pickens Team Member Don/[place]   Care Management Team Member Jose/Tom/ Michelle/Chaz   Other (Discipline and Name) Art Therapist, Jose   Patient/Family Present   Patient Present No   Patient's Family Present No

## 2023-07-10 NOTE — CONSULTS
[address]  Consult  Name: Sven Dacosta 47 y.o. female I MRN: [id number]  Unit/Bed#: -01 I Date of Admission: 7/9/2023   Date of Service: 7/10/2023 I Hospital Day: 1    Inpatient consult for Medical Clearance for [facility] patient  Consult performed by: Tin Ledezma PA-C  Consult ordered by: Ed Hank, DO          Assessment/Plan   Medical clearance for psychiatric admission  Assessment & Plan  • Admission labs reviewed, CBC, CMP, lipid panel, acceptable  • TSH elevated, T4 pending   • Vitals stable  • UDS negative  • COVID-19 negative  • EKG pending  • Medically stable for continued inpatient psychiatric treatment based on available results  • Please contact [facility] with any questions or concerns      Hyperlipidemia  Assessment & Plan  · Continue home crestor/formlary equivalent    Hypertension  Assessment & Plan  · Continue home cozaar 50 mg daily   · SBP 140s this morning     Hypothyroidism  Assessment & Plan  · Continue synthroid 75 mcg daily   · TSH elevated at 12, T4 pending            Counseling / Coordination of Care Time: 30 minutes. Greater than 50% of total time spent on patient counseling and coordination of care. Collaboration of Care: Were Recommendations Directly Discussed with Primary Treatment Team? - No     History of Present Illness: Sven Dacosta is a 47 y.o. female with PMH of hypertension, hyperlipidemia, hypothyroidism, hx of prior partial hysterectomy, anxiety and depression who is originally admitted to the psychiatry service due to worsening depression and expression of SI. We are consulted for medical clearance for admission to [address] [place] [address] and treatment of underlying psychiatric illness. Discussed patient's medical history as above. Patient reports she takes Synthroid 75 mcg daily, Zoloft, BuSpar, Cozaar 50 mg daily, as well as a statin.   Patient also brought her home nabumetone 500 mg daily, which is used for arthritis/pain. Patient denies any alcohol, tobacco use, drug use. Patient reports that she currently has mild right pelvic pain similar to prior episodes of right pelvic pain in the past secondary to simple ovarian cysts. He denies any fevers or chills, chest pain, cough, congestion, shortness of breath, nausea/vomiting/diarrhea, vaginal discharge or bleeding, dysuria, urinary frequency, flank pain, or additional complaints or concerns. Labs/vitals reviewed, based on all available data, patient appears medically stable to continue inpatient psychiatric treatment. Review of Systems:    Review of Systems   Constitutional: Negative for activity change, chills, fatigue and fever. HENT: Negative for congestion, rhinorrhea and sore throat. Eyes: Negative for visual disturbance. Respiratory: Negative for cough, chest tightness, shortness of breath and wheezing. Cardiovascular: Negative for chest pain, palpitations and leg swelling. Gastrointestinal: Positive for abdominal pain. Negative for constipation, diarrhea, nausea and vomiting. Right-sided pelvic pain   Genitourinary: Negative for difficulty urinating, dysuria, frequency and urgency. Musculoskeletal: Negative for arthralgias, back pain and myalgias. Skin: Negative for rash and wound. Neurological: Negative for dizziness, light-headedness and headaches. All other systems reviewed and are negative. Past Medical and Surgical History:     Past Medical History:   Diagnosis Date   • Coronary artery disease    • Disease of thyroid gland    • Hypertension    • MI (myocardial infarction) ([address] [facility])    • Suicide ([address] [facility])     Attempt to OD on tylenol in 2021       Past Surgical History:   Procedure Laterality Date   • HYSTERECTOMY         Meds/Allergies:    PTA meds:   Prior to Admission Medications   Prescriptions Last Dose Informant Patient Reported? Taking?    betamethasone, augmented, (DIPROLENE) 0.05 % lotion   No No   Sig: Apply topically 2 (two) times a day   Patient not taking: Reported on 7/9/2023   clobetasol (TEMOVATE) 0.05 % ointment   No No   Sig: Apply topically 2 (two) times a day   Patient not taking: Reported on 7/9/2023   levothyroxine 50 mcg tablet   No No   Sig: Take 1 tablet (50 mcg total) by mouth daily   Patient not taking: Reported on 7/9/2023   mupirocin (BACTROBAN) 2 % ointment   No No   Sig: Apply topically 3 (three) times a day   Patient not taking: Reported on 7/9/2023   triamcinolone (KENALOG) 0.1 % cream   No No   Sig: Apply topically 2 (two) times a day   Patient not taking: Reported on 7/9/2023      Facility-Administered Medications: None       Allergies: Allergies   Allergen Reactions   • Acetaminophen Other (See Comments)     Pt attempted to OD on this med in the past and doesn't want to take it ever again       Social History:     Marital Status:     Substance Use History:   Social History     Substance and Sexual Activity   Alcohol Use Never     Social History     Tobacco Use   Smoking Status Never   Smokeless Tobacco Never   Tobacco Comments    Non-smoker. Social History     Substance and Sexual Activity   Drug Use Never       Family History:    History reviewed. No pertinent family history. Physical Exam:     Vitals:   Blood Pressure: (!) 148/105 (07/10/23 0739)  Pulse: 71 (07/10/23 0739)  Temperature: 98.4 °F (36.9 °C) (07/10/23 0739)  Temp Source: Tympanic (07/10/23 0739)  Respirations: 18 (07/10/23 0739)  Height: 5' 4" (162.6 cm) (07/09/23 2051)  Weight - Scale: 79.5 kg (175 lb 3.2 oz) (07/10/23 0957)  SpO2: 100 % (07/09/23 2051)    Physical Exam  Vitals and nursing note reviewed. Constitutional:       General: She is not in acute distress. Appearance: She is not ill-appearing. HENT:      Head: Normocephalic and atraumatic. Nose: Nose normal.   Eyes:      General:         Right eye: No discharge. Left eye: No discharge. Extraocular Movements: Extraocular movements intact. Conjunctiva/sclera: Conjunctivae normal.   Cardiovascular:      Rate and Rhythm: Normal rate and regular rhythm. Heart sounds: Normal heart sounds. No murmur heard. Pulmonary:      Effort: Pulmonary effort is normal. No respiratory distress. Breath sounds: Normal breath sounds. No wheezing, rhonchi or rales. Abdominal:      General: Bowel sounds are normal.      Palpations: Abdomen is soft. Tenderness: There is abdominal tenderness. There is no guarding. Comments: Mild right lower quadrant tenderness   Musculoskeletal:      Right lower leg: No edema. Left lower leg: No edema. Skin:     General: Skin is warm and dry. Neurological:      General: No focal deficit present. Mental Status: She is alert and oriented to person, place, and time. Cranial Nerves: No cranial nerve deficit. Psychiatric:         Mood and Affect: Mood normal.         Behavior: Behavior normal.         Additional Data:     Lab Results: I have personally reviewed pertinent reports. Results from last 7 days   Lab Units 07/10/23  0621   WBC Thousand/uL 6.48   HEMOGLOBIN g/dL 14.6   HEMATOCRIT % 44.3   PLATELETS Thousands/uL 272   NEUTROS PCT % 53   LYMPHS PCT % 34   MONOS PCT % 7   EOS PCT % 4     Results from last 7 days   Lab Units 07/10/23  0621   SODIUM mmol/L 140   POTASSIUM mmol/L 4.0   CHLORIDE mmol/L 105   CO2 mmol/L 28   BUN mg/dL 19   CREATININE mg/dL 0.70   ANION GAP mmol/L 7   CALCIUM mg/dL 9.4   ALBUMIN g/dL 4.5   TOTAL BILIRUBIN mg/dL 0.51   ALK PHOS U/L 67   ALT U/L 8   AST U/L 15   GLUCOSE RANDOM mg/dL 89             Lab Results   Component Value Date/Time    HGBA1C 5.6 05/05/2021 09:26 AM    HGBA1C 5.6 10/18/2020 09:22 AM           EKG, Pathology, and Other Studies Reviewed on Admission:   · EKG pending    ** Please Note: This note has been constructed using a voice recognition system.  **

## 2023-07-10 NOTE — NURSING NOTE
BIN   Blue pants strings   Plaid Shirt with strings   usb and box   cellphone   Green wallet   Check book   Pen   Loose change qters   Band-Aid's and nail clippers  Yellow paper with one loose qter by it self  Insurance cards   kohls card   Reward cards  tremaine gift card x2  Cracker barrel   ollies card   10 % discount card to the [facility]   ebt   [employer]und[employer]at card   fermin fischer   [employer] rounting number , your account number   cellphone         Bedside   Bra

## 2023-07-10 NOTE — TREATMENT PLAN
RN will meet with patient at least twice per day to assess for questions or concerns, educate on the treatment plan, and encourage healthy coping skills and group attendance.

## 2023-07-11 PROCEDURE — 99232 SBSQ HOSP IP/OBS MODERATE 35: CPT | Performed by: STUDENT IN AN ORGANIZED HEALTH CARE EDUCATION/TRAINING PROGRAM

## 2023-07-11 RX ORDER — NAPROXEN 500 MG/1
500 TABLET ORAL 2 TIMES DAILY PRN
Status: DISCONTINUED | OUTPATIENT
Start: 2023-07-11 | End: 2023-07-20 | Stop reason: HOSPADM

## 2023-07-11 RX ADMIN — BUSPIRONE HYDROCHLORIDE 5 MG: 5 TABLET ORAL at 17:21

## 2023-07-11 RX ADMIN — LOSARTAN POTASSIUM 50 MG: 50 TABLET, FILM COATED ORAL at 09:30

## 2023-07-11 RX ADMIN — ATORVASTATIN CALCIUM 40 MG: 40 TABLET, FILM COATED ORAL at 17:21

## 2023-07-11 RX ADMIN — SERTRALINE HYDROCHLORIDE 25 MG: 25 TABLET ORAL at 09:30

## 2023-07-11 RX ADMIN — BUSPIRONE HYDROCHLORIDE 5 MG: 5 TABLET ORAL at 09:30

## 2023-07-11 RX ADMIN — LEVOTHYROXINE SODIUM 75 MCG: 75 TABLET ORAL at 05:38

## 2023-07-11 NOTE — PLAN OF CARE
Problem: DISCHARGE PLANNING  Goal: Discharge to home or other facility with appropriate resources  Description: INTERVENTIONS:  - Identify barriers to discharge w/patient and caregiver  - Arrange for needed discharge resources and transportation as appropriate  - Identify discharge learning needs (meds, wound care, etc.)  - Arrange for interpretive services to assist at discharge as needed  - Refer to Case Management Department for coordinating discharge planning if the patient needs post-hospital services based on physician/advanced practitioner order or complex needs related to functional status, cognitive ability, or social support system  Outcome: Progressing   - CM met with patient and went over Treatment Plan, Intake, and HARRY's.

## 2023-07-11 NOTE — DISCHARGE INSTR - APPOINTMENTS
You are being discharged to your confirmed address of [address] [place] [address]. You will be contacted at your confirmed phone number of [phone]. Your  while you were at [address] [place] [address] was Xavier Kim who can be reached at phone number [phone] and fax number [phone]. Patt José or Mechelle, our Lynda and Reuben, will be calling you after your discharge, on the phone number that you provided. They will be available as an additional support, if needed. If you wish to speak with one of them, you may contact Patt José at [phone] or Luana Andre at [phone].

## 2023-07-11 NOTE — NURSING NOTE
Patient resting in bed, eyes closed, respirations regular, even and non-labored. Pt. arouses easily. No s/s of pain or distress noted. Personal items within reach. Q 7 minute safety checks maintained throughout shift.

## 2023-07-11 NOTE — NURSING NOTE
Pt denies HI/SI/AVH. Pt expresses continuing worry/anxiety r/t financial struggles. Pt uses journal to write down thoughts and says this is helpful. Pt is pleasant during interactions and med compliant. Pt is social with peers and frequently attends groups. No questions or concerns at this time.

## 2023-07-11 NOTE — PROGRESS NOTES
Progress Note - [address] 47 y.o. female MRN: [id number]  Unit/Bed#: [facility] 211-01 Encounter: [id number]    Assessment/Plan   Principal Problem:    Recurrent major depressive disorder ([address] [facility])  Active Problems:    Anxiety    Moderate protein-calorie malnutrition (HCC)    Medical clearance for psychiatric admission    Hypothyroidism    Hypertension    Hyperlipidemia      Subjective: Patient was seen, chart was reviewed, and case was discussed with the team. Patient appears depressed, anxious and tearful. She is worried about her family, financial issues, health and bills she has to pay. She is also worried for tomorrow as it is her husbands death anniversary. She will approach staff if she needs any help. Denies any thoughts to hurt self. She is compliant with medications. Denies any side effects.     Behavior over the last 24 hours:  unchanged  Sleep: poor  Appetite: normal  Medication side effects: No    Medical ROS: Pertinent items are noted in HPI.all other systems are negative    Current Medications:  Current Facility-Administered Medications   Medication Dose Route Frequency   • aluminum-magnesium hydroxide-simethicone (MAALOX) oral suspension 30 mL  30 mL Oral Q4H PRN   • atorvastatin (LIPITOR) tablet 40 mg  40 mg Oral Daily With Dinner   • benztropine (COGENTIN) injection 1 mg  1 mg Intramuscular Q4H PRN Max 6/day   • benztropine (COGENTIN) tablet 1 mg  1 mg Oral Q4H PRN Max 6/day   • bisacodyl (DULCOLAX) rectal suppository 10 mg  10 mg Rectal Daily PRN   • busPIRone (BUSPAR) tablet 5 mg  5 mg Oral BID   • hydrOXYzine HCL (ATARAX) tablet 50 mg  50 mg Oral Q6H PRN Max 4/day    Or   • diphenhydrAMINE (BENADRYL) injection 50 mg  50 mg Intramuscular Q6H PRN   • glycerin-hypromellose- (ARTIFICIAL TEARS) ophthalmic solution 1 drop  1 drop Both Eyes Q3H PRN   • hydrOXYzine HCL (ATARAX) tablet 100 mg  100 mg Oral Q6H PRN Max 4/day    Or   • LORazepam (ATIVAN) injection 2 mg  2 mg Intramuscular Q6H PRN   • hydrOXYzine HCL (ATARAX) tablet 25 mg  25 mg Oral Q6H PRN Max 4/day   • ibuprofen (MOTRIN) tablet 600 mg  600 mg Oral Q6H PRN   • ibuprofen (MOTRIN) tablet 800 mg  800 mg Oral Q8H PRN   • levothyroxine tablet 75 mcg  75 mcg Oral Early Morning   • losartan (COZAAR) tablet 50 mg  50 mg Oral Daily   • melatonin tablet 3 mg  3 mg Oral HS PRN   • naproxen (NAPROSYN) tablet 500 mg  500 mg Oral BID PRN   • OLANZapine (ZyPREXA) tablet 5 mg  5 mg Oral Q4H PRN Max 3/day    Or   • OLANZapine (ZyPREXA) IM injection 2.5 mg  2.5 mg Intramuscular Q4H PRN Max 3/day   • OLANZapine (ZyPREXA) tablet 5 mg  5 mg Oral Q3H PRN Max 3/day    Or   • OLANZapine (ZyPREXA) IM injection 5 mg  5 mg Intramuscular Q3H PRN Max 3/day   • OLANZapine (ZyPREXA) tablet 2.5 mg  2.5 mg Oral Q4H PRN Max 6/day   • polyethylene glycol (MIRALAX) packet 17 g  17 g Oral Daily PRN   • propranolol (INDERAL) tablet 10 mg  10 mg Oral Q8H PRN   • senna-docusate sodium (SENOKOT S) 8.6-50 mg per tablet 1 tablet  1 tablet Oral Daily PRN   • sertraline (ZOLOFT) tablet 25 mg  25 mg Oral Daily       Behavioral Health Medications:   all current active meds have been reviewed. Vitals:  Vitals:    07/11/23 0721   BP: 136/89   Pulse: 92   Resp: 17   Temp: (!) 97.2 °F (36.2 °C)   SpO2:        Laboratory results:    I have personally reviewed all pertinent laboratory/tests results.     Mental Status Evaluation:    Appearance:  disheveled and older than stated age   Behavior:  cooperative   Speech:  soft   Mood:  anxious and depressed   Affect:  blunted, constricted and tearful   Thought Process:  goal directed and logical   Thought Content:  normal   Perceptual Disturbances: None   Risk Potential: Suicidal Ideations none  Homicidal Ideations none  Potential for Aggression No   Sensorium:  person, place and time/date   Memory:  recent and remote memory grossly intact   Consciousness:  alert and awake    Attention: attention span appeared shorter than expected for age   Insight:  limited   Judgment: limited   Gait/Station: normal gait/station   Motor Activity: no abnormal movements       Progress Toward Goals: Progressing    Recommended Treatment: Continue with pharmacotherapy, group therapy, milieu therapy and occupational therapy. Risks, benefits and possible side effects of Medications:   Risks, benefits, alternatives, and possible side effects of patient's psychiatric medications were discussed with patient.

## 2023-07-11 NOTE — CASE MANAGEMENT
Intake           Patient Name:     :  1970   Admit Date/Time:   23 8:04 pm Discharge Date: TBD   Treatment Plan:   Reviewed and Signed   Confirmed Address:    [place]: [address] [place] [address], [address] [place]    Resides in the home with:   Alone   Will Return Home at Discharge: Address listed above      Confirmed Phone Number:   [phone]   Confirmed Email Address:   n/a    Marital Status:  Children:   1 son   Family History:            Parents:                       Siblings: Patient reported that her brother committed suicide, however she did not report any mental illness within the family     1 brother ()   Commitment Status:    Status Changes:  201    n/a    Admitted from:   [facility]   Presenting C/O:         Patient reported feeling overwhelmed with finances and work stress. As per ED Note:    Thinking about suicide for 1 week, wrote multiple suicide notes. Pt reports " I came close the other night to committing suicide but didn't do it"   Pt reports " I'm losing my health insurance, my work is stressful, I'm behind on bills". Past Inpatient Tx:   Patient recalls 2 previous inpatient stays when she was a teen. Past Suicide Attempts:   Recalls having thoughts as a teen but no attempts    Current outpatient:  Will be referred- no services in place at the moment    Psychiatrist:   Will be referred - patient reports that her insurance will be changing in one week    Therapist:   Will be referred - patient reports that her insurance will be changing in one week    ACT/ICM/CPS/WRT/SC:   Declined    PCP:   Dr. Rodriguez [facility]    Med Hx/Concern:   HBP  High Chl  Thyroid concerns    Medications:   Zoloft 25 mg  Buspar 5 mg    Pharmacy:   [facility]/PHARMACY #[id number]    Spirituality/Pentecostalism:   n/a   Education:   GED    Work/Income:    Preferred time for appts:  at [employer] on work    Legal: Probation/[place] Ofc: No    Access to Firearms:   Yes (reports guns in the home that are antique and belong to her son, has not ammunition for the guns and does not know where the ammunition is). Transportation:   Drives    Strength: Motivated to get well    Coping Skills: Tv, and playing games   Goal:   To get better    Referrals Needed:     [facility] - does not want to attend any facility in [facility] for outpatient    Transport at Discharge:   Hospital will arrange    IMM:  Dilcia [place] HARRY: n/a    Emergency Contact:    Evaristo Fung (patient’s son) phone number not listed in EPIC and patient could not recall    ROIs obtained:     [facility]    Insurance:    [id number] [facility]   Audit:       0 PAWSS: 0   Substance Abuse: Freq.  BAT: 0 UDS: negative    Heroin Denies  Amount Last Use Notes:   Amp/Meth Denies      Alcohol Denies      Cocaine Denies      Cannabis Denies      Benzodiazepine Denies      Barbituartes Denies      Other Denies      Tobacco Denies

## 2023-07-11 NOTE — TREATMENT TEAM
07/11/23 1225   Activity/Group Checklist   Group Admission/Discharge   Attendance Attended   Attendance Duration (min) 0-15   Interactions Interacted appropriately   Affect/Mood Appropriate;Calm   Goals Achieved Identified feelings; Able to listen to others; Able to engage in interactions; Other (Comment)  (pt independently filled out the admission self-assessment form with this therapist available for support if needed. once completed, pt had no questions or concerns.  copy of form to be placed in DC folder.)

## 2023-07-11 NOTE — TREATMENT TEAM
07/11/23 0970   Activity/Group Checklist   Group Community meeting   Attendance Attended   Attendance Duration (min) 16-30   Interactions Interacted appropriately   Affect/Mood Appropriate   Goals Achieved Able to listen to others; Able to engage in interactions; Other (Comment)  (walked laps on the unit with peers and this therapist)

## 2023-07-11 NOTE — CASE MANAGEMENT
Patient reported that there are antique guns in the home that she is holding for her son. Patient reported that she does not have ammunition for the guns, she does not know where it is. Patient reported that there is no one else that can hold the guns for her son and her son is not able to have the guns in his home at the time being.

## 2023-07-11 NOTE — PROGRESS NOTES
Patient is described as pleasant during interactions with other and cooperative. Patient is compliant with meals and medications. Patient has reported an increase in anxiety symptoms. Patient denies SI/HI or hallucinations. Patient has attended groups. Discharge Date: TBD 07/11/23 0830   Team Meeting   Meeting Type Daily Rounds   Team Members Present   Team Members Present Physician;Nurse;; Other (Discipline and Name)   Physician Team Member Dr. Morris Jackson/ [place] Student   Nursing Team Member Dominique/Don   Care Management Team Member Michelle/Jose/Chaz/Chrissie   Other (Discipline and Name) Art Therapist, Jose   Patient/Family Present   Patient Present No   Patient's Family Present No

## 2023-07-11 NOTE — PROGRESS NOTES
PROCTOR Group Note     07/11/23 1000 07/11/23 1230   Activity/Group Checklist   Group Community meeting  (Goals/Positive Affirmations) Life Skills  (Anger/Anxiety/Depression Board Game)   Attendance Attended Attended   Attendance Duration (min) 16-30 46-60   Interactions Interacted appropriately  (More open to discussing feelings and stressors with group) Interacted appropriately  (participated with encouragement)   Affect/Mood Appropriate;Calm  (Focused and motivated) Appropriate;Bright  (Focused)   Goals Achieved Identified feelings; Identified triggers; Discussed coping strategies; Discussed self-esteem issues; Displayed empathy;Able to listen to others; Able to engage in interactions; Able to reflect/comment on own behavior;Able to self-disclose; Able to recieve feedback; Able to give feedback to another  (developed appropriate goals) Identified feelings; Identified triggers; Discussed coping strategies; Discussed self-esteem issues; Able to listen to others; Able to engage in interactions; Able to reflect/comment on own behavior;Able to self-disclose; Able to recieve feedback; Able to give feedback to another

## 2023-07-11 NOTE — PROGRESS NOTES
Reviewed Diagnosis of: Principal Problem:    Recurrent major depressive disorder ([address])  Active Problems:    Anxiety    Moderate protein-calorie malnutrition ([address])    Reviewed Short Term Goals of:decrease in depressive symptoms, decrease in anxiety symptoms, sleep improvement, improvement in appetite, mood stabilization      All parties in agreement.         07/11/23 0805   Team Meeting   Meeting Type Tx Team Meeting   Team Members Present   Team Members Present Physician;Nurse;   Physician Team Member Dr. Andrey Salazar Team Member [id number] [facility] Management Team Member Cora Adams   Patient/Family Present   Patient Present No  (Patient declined to participate)   Patient's Family Present No

## 2023-07-12 PROCEDURE — 99232 SBSQ HOSP IP/OBS MODERATE 35: CPT | Performed by: STUDENT IN AN ORGANIZED HEALTH CARE EDUCATION/TRAINING PROGRAM

## 2023-07-12 RX ADMIN — BUSPIRONE HYDROCHLORIDE 5 MG: 5 TABLET ORAL at 17:02

## 2023-07-12 RX ADMIN — LOSARTAN POTASSIUM 50 MG: 50 TABLET, FILM COATED ORAL at 08:00

## 2023-07-12 RX ADMIN — BUSPIRONE HYDROCHLORIDE 5 MG: 5 TABLET ORAL at 08:00

## 2023-07-12 RX ADMIN — LEVOTHYROXINE SODIUM 75 MCG: 75 TABLET ORAL at 05:37

## 2023-07-12 RX ADMIN — SERTRALINE HYDROCHLORIDE 25 MG: 25 TABLET ORAL at 08:00

## 2023-07-12 RX ADMIN — ATORVASTATIN CALCIUM 40 MG: 40 TABLET, FILM COATED ORAL at 17:02

## 2023-07-12 NOTE — PROGRESS NOTES
Progress Note - [address] 47 y.o. female MRN: [id number]  Unit/Bed#: [email or web address] 211-01 Encounter: [id number]    Assessment/Plan   Principal Problem:    Recurrent major depressive disorder ([address])  Active Problems:    Anxiety    Moderate protein-calorie malnutrition (HCC)    Medical clearance for psychiatric admission    Hypothyroidism    Hypertension    Hyperlipidemia      Subjective: Patient was seen, chart was reviewed, and case was discussed with the team. Patient appears tearful, anxious and depressed. Reports that she struggles in July ever year as it is death anniversary and wedding anniversary on 13 th. She endorses depressed mood, anxiety and guilt. Sleep was disturbed. Appetite is poor. She doesn't want any medication adjustment yet. Denies any side effects.    Behavior over the last 24 hours:  unchanged  Sleep: normal  Appetite: normal  Medication side effects: No    Medical ROS: Pertinent items are noted in HPI.all other systems are negative    Current Medications:  Current Facility-Administered Medications   Medication Dose Route Frequency   • aluminum-magnesium hydroxide-simethicone (MAALOX) oral suspension 30 mL  30 mL Oral Q4H PRN   • atorvastatin (LIPITOR) tablet 40 mg  40 mg Oral Daily With Dinner   • benztropine (COGENTIN) injection 1 mg  1 mg Intramuscular Q4H PRN Max 6/day   • benztropine (COGENTIN) tablet 1 mg  1 mg Oral Q4H PRN Max 6/day   • bisacodyl (DULCOLAX) rectal suppository 10 mg  10 mg Rectal Daily PRN   • busPIRone (BUSPAR) tablet 5 mg  5 mg Oral BID   • hydrOXYzine HCL (ATARAX) tablet 50 mg  50 mg Oral Q6H PRN Max 4/day    Or   • diphenhydrAMINE (BENADRYL) injection 50 mg  50 mg Intramuscular Q6H PRN   • glycerin-hypromellose- (ARTIFICIAL TEARS) ophthalmic solution 1 drop  1 drop Both Eyes Q3H PRN   • hydrOXYzine HCL (ATARAX) tablet 100 mg  100 mg Oral Q6H PRN Max 4/day    Or   • LORazepam (ATIVAN) injection 2 mg  2 mg Intramuscular Q6H PRN   • hydrOXYzine HCL (ATARAX) tablet 25 mg  25 mg Oral Q6H PRN Max 4/day   • ibuprofen (MOTRIN) tablet 600 mg  600 mg Oral Q6H PRN   • ibuprofen (MOTRIN) tablet 800 mg  800 mg Oral Q8H PRN   • levothyroxine tablet 75 mcg  75 mcg Oral Early Morning   • losartan (COZAAR) tablet 50 mg  50 mg Oral Daily   • melatonin tablet 3 mg  3 mg Oral HS PRN   • naproxen (NAPROSYN) tablet 500 mg  500 mg Oral BID PRN   • OLANZapine (ZyPREXA) tablet 5 mg  5 mg Oral Q4H PRN Max 3/day    Or   • OLANZapine (ZyPREXA) IM injection 2.5 mg  2.5 mg Intramuscular Q4H PRN Max 3/day   • OLANZapine (ZyPREXA) tablet 5 mg  5 mg Oral Q3H PRN Max 3/day    Or   • OLANZapine (ZyPREXA) IM injection 5 mg  5 mg Intramuscular Q3H PRN Max 3/day   • OLANZapine (ZyPREXA) tablet 2.5 mg  2.5 mg Oral Q4H PRN Max 6/day   • polyethylene glycol (MIRALAX) packet 17 g  17 g Oral Daily PRN   • propranolol (INDERAL) tablet 10 mg  10 mg Oral Q8H PRN   • senna-docusate sodium (SENOKOT S) 8.6-50 mg per tablet 1 tablet  1 tablet Oral Daily PRN   • sertraline (ZOLOFT) tablet 25 mg  25 mg Oral Daily       Behavioral Health Medications:   all current active meds have been reviewed. Vitals:  Vitals:    07/12/23 1300   BP: 159/94   Pulse: 71   Resp:    Temp:    SpO2:        Laboratory results:    I have personally reviewed all pertinent laboratory/tests results.     Mental Status Evaluation:    Appearance:  disheveled and older than stated age   Behavior:  restless and fidgety   Speech:  soft   Mood:  anxious and depressed   Affect:  constricted and tearful   Thought Process:  goal directed and logical   Thought Content:  normal   Perceptual Disturbances: None   Risk Potential: Suicidal Ideations none  Homicidal Ideations none  Potential for Aggression No   Sensorium:  person, place, time/date, situation, day of week, month of year and year   Memory:  recent and remote memory grossly intact   Consciousness:  alert and awake    Attention: attention span appeared shorter than expected for age Insight:  limited   Judgment: limited   Gait/Station: normal gait/station   Motor Activity: no abnormal movements       Progress Toward Goals: Progressing    Recommended Treatment: Continue with pharmacotherapy, group therapy, milieu therapy and occupational therapy. 1. Might need further titration of medications. Risks, benefits and possible side effects of Medications:   Risks, benefits, alternatives, and possible side effects of patient's psychiatric medications were discussed with patient.

## 2023-07-12 NOTE — PLAN OF CARE
Problem: Ineffective Coping  Goal: Identifies healthy coping skills  Outcome: Progressing  Goal: Participates in unit activities  Description: Interventions:  - Provide therapeutic environment   - Provide required programming   - Redirect inappropriate behaviors   Outcome: Progressing     Problem: Depression  Goal: Refrain from isolation  Description: Interventions:  - Develop a trusting relationship   - Encourage socialization   Outcome: Progressing  Goal: Refrain from self-neglect  Outcome: Progressing  Goal: Complete daily ADLs, including personal hygiene independently, as able  Description: Interventions:  - Observe, teach, and assist patient with ADLS  -  Monitor and promote a balance of rest/activity, with adequate nutrition and elimination   Outcome: Progressing     Problem: Anxiety  Goal: Anxiety is at manageable level  Description: Interventions:  - Assess and monitor patient's anxiety level. - Monitor for signs and symptoms (heart palpitations, chest pain, shortness of breath, headaches, nausea, feeling jumpy, restlessness, irritable, apprehensive). - Collaborate with interdisciplinary team and initiate plan and interventions as ordered. - [place] patient to unit/surroundings  - Explain treatment plan  - Encourage participation in care  - Encourage verbalization of concerns/fears  - Identify coping mechanisms  - Assist in developing anxiety-reducing skills  - Administer/offer alternative therapies  - Limit or eliminate stimulants  Outcome: Progressing     Problem: Nutrition/Hydration-ADULT  Goal: Nutrient/Hydration intake appropriate for improving, restoring or maintaining nutritional needs  Description: Monitor and assess patient's nutrition/hydration status for malnutrition. Collaborate with interdisciplinary team and initiate plan and interventions as ordered. Monitor patient's weight and dietary intake as ordered or per policy. Utilize nutrition screening tool and intervene as necessary. Determine patient's food preferences and provide high-protein, high-caloric foods as appropriate.      INTERVENTIONS:  - Monitor oral intake, urinary output, labs, and treatment plans  - Assess nutrition and hydration status and recommend course of action  - Evaluate amount of meals eaten  - Assist patient with eating if necessary   - Allow adequate time for meals  - Recommend/ encourage appropriate diets, oral nutritional supplements, and vitamin/mineral supplements  - Order, calculate, and assess calorie counts as needed  - Recommend, monitor, and adjust tube feedings and TPN/PPN based on assessed needs  - Assess need for intravenous fluids  - Provide specific nutrition/hydration education as appropriate  - Include patient/family/caregiver in decisions related to nutrition  Outcome: Progressing

## 2023-07-12 NOTE — NURSING NOTE
Pt continues to express concerns about taking medications; this writer encouraged pt to give Buspar a chance as it does not work immediately and may take time to see results. Remains with sad affect, reports "just having a bad day because its the death anniversary of ", hopeful for a better day tomorrow. Denies SI/HI/AVH.

## 2023-07-12 NOTE — PLAN OF CARE
Problem: Ineffective Coping  Goal: Identifies healthy coping skills  Outcome: Progressing  Goal: Participates in unit activities  Description: Interventions:  - Provide therapeutic environment   - Provide required programming   - Redirect inappropriate behaviors   Outcome: Progressing     Problem: Depression  Goal: Refrain from isolation  Description: Interventions:  - Develop a trusting relationship   - Encourage socialization   Outcome: Progressing  Goal: Refrain from self-neglect  Outcome: Progressing  Goal: Complete daily ADLs, including personal hygiene independently, as able  Description: Interventions:  - Observe, teach, and assist patient with ADLS  -  Monitor and promote a balance of rest/activity, with adequate nutrition and elimination   Outcome: Progressing     Problem: Anxiety  Goal: Anxiety is at manageable level  Description: Interventions:  - Assess and monitor patient's anxiety level. - Monitor for signs and symptoms (heart palpitations, chest pain, shortness of breath, headaches, nausea, feeling jumpy, restlessness, irritable, apprehensive). - Collaborate with interdisciplinary team and initiate plan and interventions as ordered. - [place] patient to unit/surroundings  - Explain treatment plan  - Encourage participation in care  - Encourage verbalization of concerns/fears  - Identify coping mechanisms  - Assist in developing anxiety-reducing skills  - Administer/offer alternative therapies  - Limit or eliminate stimulants  Outcome: Progressing     Problem: Nutrition/Hydration-ADULT  Goal: Nutrient/Hydration intake appropriate for improving, restoring or maintaining nutritional needs  Description: Monitor and assess patient's nutrition/hydration status for malnutrition. Collaborate with interdisciplinary team and initiate plan and interventions as ordered. Monitor patient's weight and dietary intake as ordered or per policy. Utilize nutrition screening tool and intervene as necessary. Determine patient's food preferences and provide high-protein, high-caloric foods as appropriate.      INTERVENTIONS:  - Monitor oral intake, urinary output, labs, and treatment plans  - Assess nutrition and hydration status and recommend course of action  - Evaluate amount of meals eaten  - Assist patient with eating if necessary   - Allow adequate time for meals  - Recommend/ encourage appropriate diets, oral nutritional supplements, and vitamin/mineral supplements  - Order, calculate, and assess calorie counts as needed  - Recommend, monitor, and adjust tube feedings and TPN/PPN based on assessed needs  - Assess need for intravenous fluids  - Provide specific nutrition/hydration education as appropriate  - Include patient/family/caregiver in decisions related to nutrition  Outcome: Progressing

## 2023-07-12 NOTE — NURSING NOTE
Patient stated she was having pain in her left side, "it's either a kidney stone forming, or an ovarian cyst". Heat pack offered for pain relief, but she stated, "no, I'm just going to go to bed." Denies SI / HI / AVH. Stated she is feeling okay right now, but said tomorrow is the 8 year anniversary of her 's death, and their wedding anniversary is on the 15th of the month. Fair appetite for dinner, compliant with medications this shift.

## 2023-07-12 NOTE — NURSING NOTE
Patient observed lying down with eyes closed throughout shift. Pt. appears to be sleeping. Q 7 minute safety checks maintained.

## 2023-07-12 NOTE — TREATMENT TEAM
07/12/23 0930   Activity/Group Checklist   Group Exercise   Attendance Attended   Attendance Duration (min) 16-30   Interactions Interacted appropriately   Affect/Mood Appropriate   Goals Achieved Able to listen to others; Able to engage in interactions; Other (Comment)  (walked laps on the unit with peers and this therapist)

## 2023-07-12 NOTE — NURSING NOTE
Pt tearful and anxious this morning. Stating she's upset related to the anniversary of her husbands death. Refused offer of PRN medication stating "I'm not a pill person". Noted to be attending groups. Affect flat. Denies SI/HI/AVH. Reports anxiety remains unchanged throughout shift.

## 2023-07-12 NOTE — PROGRESS NOTES
PROCTOR Group Note     07/12/23 1230 07/12/23 1530   Activity/Group Checklist   Group Personal control  (Mindfulness Techniques - 5 Senses Grounding and Progressive Muscle Relaxation) Other (Comment)  (Open Discussion About Control, Perspectives, and Mindful Self-Awareness)   Attendance Attended Attended   Attendance Duration (min) 46-60 46-60   Interactions Interacted appropriately  (but reserved and guarded at times) Interacted appropriately   Affect/Mood Appropriate;Calm Appropriate;Calm   Goals Achieved Identified feelings; Discussed coping strategies; Able to listen to others; Able to engage in interactions; Able to reflect/comment on own behavior;Able to recieve feedback; Able to give feedback to another Able to listen to others; Able to recieve feedback; Able to give feedback to another  (received resources/benefited from social presence of group)

## 2023-07-12 NOTE — PROGRESS NOTES
[facility] Group Note     07/12/23 1000   Activity/Group Checklist   Group [employer] meeting  (Life Balance Wheel)   Attendance Attended   Attendance Duration (min) 16-30   Interactions Did not interact  (completed activity but did not share with group)   Affect/Mood Constricted  (Unfocused)   Goals Achieved Able to listen to others; Able to recieve feedback; Able to give feedback to another  (received resources/benefited from social presence of group)   Pt was visibly upset at the end of group and disclosed that today was the anniversary of her 's death. Pt discussed feelings of guilt over going to work instead of taking him to the emergency room and felt her son was justified in blaming her for him witnessing his father's passing. PROCTOR/L offered emotional support and encouraged self-expression and exploration in mindfulness techniques that could offer insight and deeper understanding of the emotions she's struggling to cope with. Plan to follow-up.

## 2023-07-12 NOTE — TREATMENT TEAM
07/12/23 1045   Activity/Group Checklist   Group Other (Comment)  (art therapy)   Attendance Attended   Attendance Duration (min) 16-30   Interactions Interacted appropriately   Affect/Mood Appropriate   Goals Achieved Able to engage in interactions; Able to listen to others; Other (Comment); Able to experience relief/decrease in symptoms  (authentic, spontaneous self expression, connection, and insight)

## 2023-07-13 PROCEDURE — 99232 SBSQ HOSP IP/OBS MODERATE 35: CPT | Performed by: STUDENT IN AN ORGANIZED HEALTH CARE EDUCATION/TRAINING PROGRAM

## 2023-07-13 RX ADMIN — SERTRALINE HYDROCHLORIDE 25 MG: 25 TABLET ORAL at 09:33

## 2023-07-13 RX ADMIN — BUSPIRONE HYDROCHLORIDE 5 MG: 5 TABLET ORAL at 09:33

## 2023-07-13 RX ADMIN — LEVOTHYROXINE SODIUM 75 MCG: 75 TABLET ORAL at 06:08

## 2023-07-13 RX ADMIN — LOSARTAN POTASSIUM 50 MG: 50 TABLET, FILM COATED ORAL at 09:33

## 2023-07-13 RX ADMIN — ATORVASTATIN CALCIUM 40 MG: 40 TABLET, FILM COATED ORAL at 16:54

## 2023-07-13 RX ADMIN — MELATONIN 3 MG: at 21:13

## 2023-07-13 RX ADMIN — BUSPIRONE HYDROCHLORIDE 5 MG: 5 TABLET ORAL at 17:41

## 2023-07-13 NOTE — TREATMENT TEAM
07/13/23 1300   Activity/Group Checklist   Group Other (Comment)  (art therapy/structured journaling)   Attendance Attended   Attendance Duration (min) 46-60   Interactions Interacted appropriately   Affect/Mood Appropriate   Goals Achieved Able to engage in interactions; Able to listen to others; Other (Comment)  (authentic, spontaneous self expression, connection, and insight)

## 2023-07-13 NOTE — NURSING NOTE
Pt pleasant during interaction. Pt reports ongoing anxiety/depression. Pt states she is unhappy at her workplace but has been unsuccessful in finding alternative employment. Pt states she is very lonely outside unit and doesn't have much support or social life. PT reports sadness r/t her friends on unit leaving; encouraged to obtain contact information to stay in touch with peers. Pt reports possibility of moving to [place] to live with her Ej Jean Baptiste and knows this would be a good living arrangement however does not think she will due to her grandchildren living here in [address]. Pt became tearful and stated if she moved in with her aunt it would help her to feel less lonely but she is unable to travel back and forth between [facility] and [place] to see her grandchildren. Pt states journaling has been very helpful for her anxiety/depression and is hopeful to continue journaling upon discharge.

## 2023-07-13 NOTE — TREATMENT TEAM
07/13/23 1000   Activity/Group Checklist   Group Exercise   Attendance Attended   Attendance Duration (min) 16-30   Interactions Interacted appropriately   Affect/Mood Appropriate;Calm   Goals Achieved Able to listen to others; Able to engage in interactions; Other (Comment)  (fully participated in walking laps on the unit with peers and this therapist)

## 2023-07-13 NOTE — TREATMENT TEAM
07/12/23 1330   Activity/Group Checklist   Group Other (Comment)  (structured journaling)   Attendance Attended   Attendance Duration (min) 46-60   Interactions Interacted appropriately   Affect/Mood Appropriate   Goals Achieved Able to listen to others; Able to engage in interactions; Other (Comment)  (fully participated in structured journaling.)

## 2023-07-13 NOTE — PROGRESS NOTES
Progress Note - [address]  47 y.o. female MRN: [id number]  Unit/Bed#: [facility] 211-01 Encounter: [id number]    Assessment/Plan   Principal Problem:    Recurrent major depressive disorder ([address] )  Active Problems:    Anxiety    Moderate protein-calorie malnutrition (HCC)    Medical clearance for psychiatric admission    Hypothyroidism    Hypertension    Hyperlipidemia      Subjective: Patient was seen, chart was reviewed, and case was discussed with the team. Patient appears tearful, sad and anxious. Patient endorses depressed mood, anhedonia, lack of motivation, anxiety and overwhelmed with multiple things she has to take care after discharge. Sleep was disturbed, encouraged to take melatonin. She is compliant with medications. Denies any side effects.    Behavior over the last 24 hours:  unchanged  Sleep: insomnia  Appetite: poor  Medication side effects: No    Medical ROS: Pertinent items are noted in HPI.all other systems are negative    Current Medications:  Current Facility-Administered Medications   Medication Dose Route Frequency   • aluminum-magnesium hydroxide-simethicone (MAALOX) oral suspension 30 mL  30 mL Oral Q4H PRN   • atorvastatin (LIPITOR) tablet 40 mg  40 mg Oral Daily With Dinner   • benztropine (COGENTIN) injection 1 mg  1 mg Intramuscular Q4H PRN Max 6/day   • benztropine (COGENTIN) tablet 1 mg  1 mg Oral Q4H PRN Max 6/day   • bisacodyl (DULCOLAX) rectal suppository 10 mg  10 mg Rectal Daily PRN   • busPIRone (BUSPAR) tablet 5 mg  5 mg Oral BID   • hydrOXYzine HCL (ATARAX) tablet 50 mg  50 mg Oral Q6H PRN Max 4/day    Or   • diphenhydrAMINE (BENADRYL) injection 50 mg  50 mg Intramuscular Q6H PRN   • glycerin-hypromellose- (ARTIFICIAL TEARS) ophthalmic solution 1 drop  1 drop Both Eyes Q3H PRN   • hydrOXYzine HCL (ATARAX) tablet 100 mg  100 mg Oral Q6H PRN Max 4/day    Or   • LORazepam (ATIVAN) injection 2 mg  2 mg Intramuscular Q6H PRN   • hydrOXYzine HCL (ATARAX) tablet 25 mg  25 mg Oral Q6H PRN Max 4/day   • ibuprofen (MOTRIN) tablet 600 mg  600 mg Oral Q6H PRN   • ibuprofen (MOTRIN) tablet 800 mg  800 mg Oral Q8H PRN   • levothyroxine tablet 75 mcg  75 mcg Oral Early Morning   • losartan (COZAAR) tablet 50 mg  50 mg Oral Daily   • melatonin tablet 3 mg  3 mg Oral HS PRN   • naproxen (NAPROSYN) tablet 500 mg  500 mg Oral BID PRN   • OLANZapine (ZyPREXA) tablet 5 mg  5 mg Oral Q4H PRN Max 3/day    Or   • OLANZapine (ZyPREXA) IM injection 2.5 mg  2.5 mg Intramuscular Q4H PRN Max 3/day   • OLANZapine (ZyPREXA) tablet 5 mg  5 mg Oral Q3H PRN Max 3/day    Or   • OLANZapine (ZyPREXA) IM injection 5 mg  5 mg Intramuscular Q3H PRN Max 3/day   • OLANZapine (ZyPREXA) tablet 2.5 mg  2.5 mg Oral Q4H PRN Max 6/day   • polyethylene glycol (MIRALAX) packet 17 g  17 g Oral Daily PRN   • propranolol (INDERAL) tablet 10 mg  10 mg Oral Q8H PRN   • senna-docusate sodium (SENOKOT S) 8.6-50 mg per tablet 1 tablet  1 tablet Oral Daily PRN   • sertraline (ZOLOFT) tablet 25 mg  25 mg Oral Daily       Behavioral Health Medications:   all current active meds have been reviewed. Vitals:  Vitals:    07/13/23 0727   BP: 121/66   Pulse: 60   Resp: 18   Temp: 98.5 °F (36.9 °C)   SpO2: 99%       Laboratory results:    I have personally reviewed all pertinent laboratory/tests results.     Mental Status Evaluation:    Appearance:  age appropriate   Behavior:  cooperative   Speech:  soft   Mood:  anxious and depressed   Affect:  constricted, mood-congruent and tearful   Thought Process:  goal directed and logical   Thought Content:  normal   Perceptual Disturbances: None   Risk Potential: Suicidal Ideations none  Homicidal Ideations none  Potential for Aggression No   Sensorium:  person, place and time/date   Memory:  recent and remote memory grossly intact   Consciousness:  alert and awake    Attention: attention span appeared shorter than expected for age   Insight:  limited   Judgment: limited   Gait/Station: normal gait/station   Motor Activity: no abnormal movements       Progress Toward Goals: Progressing    Recommended Treatment: Continue with pharmacotherapy, group therapy, milieu therapy and occupational therapy. Might need further titration of medications  Risks, benefits and possible side effects of Medications:   Risks, benefits, alternatives, and possible side effects of patient's psychiatric medications were discussed with patient.

## 2023-07-14 PROCEDURE — 99232 SBSQ HOSP IP/OBS MODERATE 35: CPT | Performed by: STUDENT IN AN ORGANIZED HEALTH CARE EDUCATION/TRAINING PROGRAM

## 2023-07-14 RX ORDER — TRAZODONE HYDROCHLORIDE 50 MG/1
50 TABLET ORAL
Status: DISCONTINUED | OUTPATIENT
Start: 2023-07-14 | End: 2023-07-18

## 2023-07-14 RX ADMIN — SERTRALINE HYDROCHLORIDE 25 MG: 25 TABLET ORAL at 09:20

## 2023-07-14 RX ADMIN — LEVOTHYROXINE SODIUM 75 MCG: 75 TABLET ORAL at 06:09

## 2023-07-14 RX ADMIN — BUSPIRONE HYDROCHLORIDE 5 MG: 5 TABLET ORAL at 09:21

## 2023-07-14 RX ADMIN — LOSARTAN POTASSIUM 50 MG: 50 TABLET, FILM COATED ORAL at 09:20

## 2023-07-14 RX ADMIN — ATORVASTATIN CALCIUM 40 MG: 40 TABLET, FILM COATED ORAL at 17:20

## 2023-07-14 RX ADMIN — BUSPIRONE HYDROCHLORIDE 5 MG: 5 TABLET ORAL at 17:20

## 2023-07-14 NOTE — PLAN OF CARE
Problem: DISCHARGE PLANNING  Goal: Discharge to home or other facility with appropriate resources  Description: INTERVENTIONS:  - Identify barriers to discharge w/patient and caregiver  - Arrange for needed discharge resources and transportation as appropriate  - Identify discharge learning needs (meds, wound care, etc.)  - Arrange for interpretive services to assist at discharge as needed  - Refer to Case Management Department for coordinating discharge planning if the patient needs post-hospital services based on physician/advanced practitioner order or complex needs related to functional status, cognitive ability, or social support system  Outcome: Progressing     Problem: Ineffective Coping  Goal: Identifies healthy coping skills  Outcome: Progressing  Goal: Participates in unit activities  Description: Interventions:  - Provide therapeutic environment   - Provide required programming   - Redirect inappropriate behaviors   Outcome: Progressing     Problem: Depression  Goal: Refrain from isolation  Description: Interventions:  - Develop a trusting relationship   - Encourage socialization   Outcome: Progressing  Goal: Refrain from self-neglect  Outcome: Progressing  Goal: Complete daily ADLs, including personal hygiene independently, as able  Description: Interventions:  - Observe, teach, and assist patient with ADLS  -  Monitor and promote a balance of rest/activity, with adequate nutrition and elimination   Outcome: Progressing     Problem: Anxiety  Goal: Anxiety is at manageable level  Description: Interventions:  - Assess and monitor patient's anxiety level. - Monitor for signs and symptoms (heart palpitations, chest pain, shortness of breath, headaches, nausea, feeling jumpy, restlessness, irritable, apprehensive). - Collaborate with interdisciplinary team and initiate plan and interventions as ordered.   - [place] patient to unit/surroundings  - Explain treatment plan  - Encourage participation in care  - Encourage verbalization of concerns/fears  - Identify coping mechanisms  - Assist in developing anxiety-reducing skills  - Administer/offer alternative therapies  - Limit or eliminate stimulants  Outcome: Progressing     Problem: Anxiety  Goal: Anxiety is at manageable level  Description: Interventions:  - Assess and monitor patient's anxiety level. - Monitor for signs and symptoms (heart palpitations, chest pain, shortness of breath, headaches, nausea, feeling jumpy, restlessness, irritable, apprehensive). - Collaborate with interdisciplinary team and initiate plan and interventions as ordered.   - [place] patient to unit/surroundings  - Explain treatment plan  - Encourage participation in care  - Encourage verbalization of concerns/fears  - Identify coping mechanisms  - Assist in developing anxiety-reducing skills  - Administer/offer alternative therapies  - Limit or eliminate stimulants  Outcome: Progressing

## 2023-07-14 NOTE — NURSING NOTE
Pt requested and received PRN melatonin 3mg PO at 2213. Upon follow up pt appear to be resting comfortably in bed with her eyes closed. This nurse did not disturb. Respirations even unlabored.

## 2023-07-14 NOTE — NURSING NOTE
Pt awake and OOB for breakfast. Tearful during interaction due to roommate leaving. Pt talked about multiple family losses, GM who had Alzheimer's, brother and  passing. States feeling inability to get close to people because "they all eventually leave me." Writer and roommate encouraged pt to continue towork on own treatment, as pt will have their day of discharge. Pt now OOB and walking. Denies SI, HI, AVH, but is sad and anxious about future roommates.

## 2023-07-14 NOTE — PLAN OF CARE
Problem: DISCHARGE PLANNING  Goal: Discharge to home or other facility with appropriate resources  Description: INTERVENTIONS:  - Identify barriers to discharge w/patient and caregiver  - Arrange for needed discharge resources and transportation as appropriate  - Identify discharge learning needs (meds, wound care, etc.)  - Arrange for interpretive services to assist at discharge as needed  - Refer to Case Management Department for coordinating discharge planning if the patient needs post-hospital services based on physician/advanced practitioner order or complex needs related to functional status, cognitive ability, or social support system  Outcome: Progressing     Problem: Ineffective Coping  Goal: Identifies healthy coping skills  Outcome: Progressing  Goal: Participates in unit activities  Description: Interventions:  - Provide therapeutic environment   - Provide required programming   - Redirect inappropriate behaviors   Outcome: Progressing     Problem: Depression  Goal: Refrain from isolation  Description: Interventions:  - Develop a trusting relationship   - Encourage socialization   Outcome: Progressing  Goal: Refrain from self-neglect  Outcome: Progressing  Goal: Complete daily ADLs, including personal hygiene independently, as able  Description: Interventions:  - Observe, teach, and assist patient with ADLS  -  Monitor and promote a balance of rest/activity, with adequate nutrition and elimination   Outcome: Progressing     Problem: Anxiety  Goal: Anxiety is at manageable level  Description: Interventions:  - Assess and monitor patient's anxiety level. - Monitor for signs and symptoms (heart palpitations, chest pain, shortness of breath, headaches, nausea, feeling jumpy, restlessness, irritable, apprehensive). - Collaborate with interdisciplinary team and initiate plan and interventions as ordered.   - [place] patient to unit/surroundings  - Explain treatment plan  - Encourage participation in care  - Encourage verbalization of concerns/fears  - Identify coping mechanisms  - Assist in developing anxiety-reducing skills  - Administer/offer alternative therapies  - Limit or eliminate stimulants  Outcome: Progressing     Problem: Nutrition/Hydration-ADULT  Goal: Nutrient/Hydration intake appropriate for improving, restoring or maintaining nutritional needs  Description: Monitor and assess patient's nutrition/hydration status for malnutrition. Collaborate with interdisciplinary team and initiate plan and interventions as ordered. Monitor patient's weight and dietary intake as ordered or per policy. Utilize nutrition screening tool and intervene as necessary. Determine patient's food preferences and provide high-protein, high-caloric foods as appropriate.      INTERVENTIONS:  - Monitor oral intake, urinary output, labs, and treatment plans  - Assess nutrition and hydration status and recommend course of action  - Evaluate amount of meals eaten  - Assist patient with eating if necessary   - Allow adequate time for meals  - Recommend/ encourage appropriate diets, oral nutritional supplements, and vitamin/mineral supplements  - Order, calculate, and assess calorie counts as needed  - Recommend, monitor, and adjust tube feedings and TPN/PPN based on assessed needs  - Assess need for intravenous fluids  - Provide specific nutrition/hydration education as appropriate  - Include patient/family/caregiver in decisions related to nutrition  Outcome: Progressing

## 2023-07-14 NOTE — PROGRESS NOTES
Progress Note - [address] 47 y.o. female MRN: [id number]  Unit/Bed#: [facility] 211-01 Encounter: [id number]    Assessment/Plan   Principal Problem:    Recurrent major depressive disorder ([address] )  Active Problems:    Anxiety    Moderate protein-calorie malnutrition (HCC)    Medical clearance for psychiatric admission    Hypothyroidism    Hypertension    Hyperlipidemia      Subjective: Patient was seen, chart was reviewed, and case was discussed with the team. Patient appears tearful, anxious and depressed. Patient endorses depression for many years due to multiple loses in her life over the years. Patient endorses depressed mood and fluctuating anxiety. Sleep was poor, woke multiple times. Appetite is poor. Denies any thoughts to hurt self. She is compliant with medications. Denies any side effects.    Behavior over the last 24 hours:  unchanged  Sleep: insomnia  Appetite: poor  Medication side effects: No    Medical ROS: Pertinent items are noted in HPI.all other systems are negative    Current Medications:  Current Facility-Administered Medications   Medication Dose Route Frequency   • aluminum-magnesium hydroxide-simethicone (MAALOX) oral suspension 30 mL  30 mL Oral Q4H PRN   • atorvastatin (LIPITOR) tablet 40 mg  40 mg Oral Daily With Dinner   • benztropine (COGENTIN) injection 1 mg  1 mg Intramuscular Q4H PRN Max 6/day   • benztropine (COGENTIN) tablet 1 mg  1 mg Oral Q4H PRN Max 6/day   • bisacodyl (DULCOLAX) rectal suppository 10 mg  10 mg Rectal Daily PRN   • busPIRone (BUSPAR) tablet 5 mg  5 mg Oral BID   • hydrOXYzine HCL (ATARAX) tablet 50 mg  50 mg Oral Q6H PRN Max 4/day    Or   • diphenhydrAMINE (BENADRYL) injection 50 mg  50 mg Intramuscular Q6H PRN   • glycerin-hypromellose- (ARTIFICIAL TEARS) ophthalmic solution 1 drop  1 drop Both Eyes Q3H PRN   • hydrOXYzine HCL (ATARAX) tablet 100 mg  100 mg Oral Q6H PRN Max 4/day    Or   • LORazepam (ATIVAN) injection 2 mg  2 mg Intramuscular Q6H PRN   • hydrOXYzine HCL (ATARAX) tablet 25 mg  25 mg Oral Q6H PRN Max 4/day   • ibuprofen (MOTRIN) tablet 600 mg  600 mg Oral Q6H PRN   • ibuprofen (MOTRIN) tablet 800 mg  800 mg Oral Q8H PRN   • levothyroxine tablet 75 mcg  75 mcg Oral Early Morning   • losartan (COZAAR) tablet 50 mg  50 mg Oral Daily   • melatonin tablet 3 mg  3 mg Oral HS PRN   • naproxen (NAPROSYN) tablet 500 mg  500 mg Oral BID PRN   • OLANZapine (ZyPREXA) tablet 5 mg  5 mg Oral Q4H PRN Max 3/day    Or   • OLANZapine (ZyPREXA) IM injection 2.5 mg  2.5 mg Intramuscular Q4H PRN Max 3/day   • OLANZapine (ZyPREXA) tablet 5 mg  5 mg Oral Q3H PRN Max 3/day    Or   • OLANZapine (ZyPREXA) IM injection 5 mg  5 mg Intramuscular Q3H PRN Max 3/day   • OLANZapine (ZyPREXA) tablet 2.5 mg  2.5 mg Oral Q4H PRN Max 6/day   • polyethylene glycol (MIRALAX) packet 17 g  17 g Oral Daily PRN   • propranolol (INDERAL) tablet 10 mg  10 mg Oral Q8H PRN   • senna-docusate sodium (SENOKOT S) 8.6-50 mg per tablet 1 tablet  1 tablet Oral Daily PRN   • sertraline (ZOLOFT) tablet 25 mg  25 mg Oral Daily       Behavioral Health Medications:   all current active meds have been reviewed. Vitals:  Vitals:    07/14/23 0718   BP: 137/79   Pulse: 57   Resp: 18   Temp: 98 °F (36.7 °C)   SpO2:        Laboratory results:    I have personally reviewed all pertinent laboratory/tests results.     Mental Status Evaluation:    Appearance:  age appropriate   Behavior:  cooperative   Speech:  soft   Mood:  anxious and depressed   Affect:  constricted, mood-congruent and tearful   Thought Process:  goal directed and logical   Thought Content:  normal   Perceptual Disturbances: None   Risk Potential: Suicidal Ideations none  Homicidal Ideations none  Potential for Aggression No   Sensorium:  person, place, time/date, situation, day of week, month of year and year   Memory:  recent and remote memory grossly intact   Consciousness:  alert and awake    Attention: attention span appeared shorter than expected for age   Insight:  limited   Judgment: limited   Gait/Station: normal gait/station   Motor Activity: no abnormal movements       Progress Toward Goals: Progressing    Recommended Treatment: Continue with pharmacotherapy, group therapy, milieu therapy and occupational therapy. 1. Can go up on medications if patient agrees. 2. Melatonin was not helpful , trazodone PRN  Risks, benefits and possible side effects of Medications:   Risks, benefits, alternatives, and possible side effects of patient's psychiatric medications were discussed with patient.

## 2023-07-14 NOTE — TREATMENT TEAM
07/14/23 1330   Activity/Group Checklist   Group Other (Comment)  (art therapy)   Attendance Attended   Attendance Duration (min) 46-60   Interactions Interacted appropriately   Affect/Mood Appropriate   Goals Achieved Able to engage in interactions; Able to listen to others; Other (Comment)  (autehntic, spontaneous self expression,c onnection, and insight)

## 2023-07-14 NOTE — NURSING NOTE
Pt is awake, alert, visible and social throughout evening. Attending and participating in groups. Pt reports that they anticipate "a lot of crying tomorrow." Reports that tomorrow is her 33th wedding anniversary and 3 days ago was anniversary of 's passing. Pt encouraged to alert staff if struggling and encouraged to continue group participation, pt acknowledged. Denies SI, HI, KARRI.

## 2023-07-14 NOTE — NURSING NOTE
Pt has been visible in milieu, social with peers, participates well in group therapy and unit activities. Continues to report anxiety and depression, denies SI/HI/AVH. No behavioral issues or overt delusions observed.

## 2023-07-14 NOTE — TREATMENT TEAM
07/14/23 1000   Activity/Group Checklist   Group Exercise   Attendance Attended   Attendance Duration (min) 0-15  (came to group late, was meeting with Dr, and was unsure in wanting to attend group, was tearful after meeting with .)   Interactions Interacted appropriately   Affect/Mood Blunted/flat; Other (Comment)  (tearful at times.)   Goals Achieved Able to listen to others; Able to engage in interactions; Other (Comment)  (walked laps on the unit with peers and this therapist)

## 2023-07-14 NOTE — CASE MANAGEMENT
CM met with Pt to discuss discharge planning. CM asked if the Pt is still not interested in [address] [place] [address] in [facility]. Pt confirmed they are not but would be ok with services in [place] or [facility].

## 2023-07-15 PROCEDURE — 99232 SBSQ HOSP IP/OBS MODERATE 35: CPT | Performed by: PSYCHIATRY & NEUROLOGY

## 2023-07-15 RX ADMIN — LEVOTHYROXINE SODIUM 75 MCG: 75 TABLET ORAL at 06:08

## 2023-07-15 NOTE — NURSING NOTE
Patient was lying in bed but opened eyes when approached. She refused morning medications stating ,"I am not going to take any of my meds today." When asked why she mentioned her increased depression related to her  and their anniversary. Patient expressed interest in going to group and using her journal for distraction and reported interrupted sleep due to snoring on the unit. She spoke about her grandchildren and her mom and sister as a support. Gaelies DOMINIQUE, HI, AVH. Encouraged to speak with staff.

## 2023-07-15 NOTE — PROGRESS NOTES
Progress Note - [address]  47 y.o. female MRN: [id number]  Unit/Bed#: [facility] 211-01 Encounter: [id number]    Assessment/Plan   Principal Problem:    Recurrent major depressive disorder ([address])  Active Problems:    Anxiety    Moderate protein-calorie malnutrition (HCC)    Medical clearance for psychiatric admission    Hypothyroidism    Hypertension    Hyperlipidemia      Recommended Treatment:   No psychopharmacologic changes necessary at this moment; will continue to assess daily for further optimization. Continue with pharmacotherapy, group therapy, milieu therapy and occupational therapy. Continue to assess for adverse medication side effects. Encourage Jean Resides to participate in nonverbal forms of therapy including journaling and art/music therapy. Continue frequent safety checks and vitals per unit protocol. Continue to engage CM/SW to assist with collateral, disposition planning, and the implementation of an individualized, patient-centered plan of care. Continue medical management by medical team.    Discharge disposition:  Per primary team and case management  Case discussed with treatment team.    ------------------------------------------------------------    Subjective: All documentation including nursing notes, medication history to ensure medication adherence on the unit, labs, and vitals were reviewed. Shereen Manual was evaluated this morning for continuity of care and no acute distress noted throughout the evaluation. Over the past 24 hours, staff noted the patient has been cooperative on the unit and compliant with medications with no acute events overnight. Nursing reported patient went to groups, is pleasant, was sobbing earlier due to wedding anniversary with  .     Today on exam, the patient was seen in the day room after interacting in groups and with peers and reports feeling "today is not a good day."  Patient reported 3 days prior to her ding anniversary, her  passed away unfortunately. Patient reports month of July is always difficult. Patient also reported grandmother with Alzheimer's passing was a traumatic experience and she now is afraid of creating relationships due to fear of losing loved ones. Patient was provided supportive psychotherapy as this provider monitored patient's affect, patient receptive to CBT based exercises including journaling or writing letter to  showing appreciation and positive memories. Patient was receptive to challenging cognitive distortions regarding relationships, and happiness obtained from overcoming hardships, challenges, or obstacles. Patient did report sleeping poorly last night but attributed to someone snoring on the unit, understands to go to nursing for as needed medications if needed, fair appetite, did report passive SI due to wedding anniversary but adamantly denied any active SI/HI, plan, or intent, and understands to report to nursing if thoughts worsen. Patient voiced gratitude for this provider providing additional therapy, and receptive to encouragement to attend more groups while on the unit and will consider restarting medications as she refused this morning due to not wanting to be on medications long-term. Today, Reynaldo Bolanos is consenting for safety on the unit. Progress Toward Goals: slow improvement    Psychiatric Review of Systems:  Behavior over the last 24 hours:  regressed  Sleep: decreased, slept off and on  Appetite: normal  Medication side effects: No   ROS: no complaints, all other systems are negative    Vital signs in last 24 hours:  Temp:  [98 °F (36.7 °C)-98.7 °F (37.1 °C)] 98.7 °F (37.1 °C)  HR:  [57-78] 57  Resp:  [16-18] 16  BP: (110-120)/(67-81) 110/67    Laboratory results:  I have personally reviewed all pertinent laboratory/tests results. No results found for this or any previous visit (from the past 48 hour(s)).       Mental Status Evaluation:    Appearance: age appropriate, casually dressed, looks stated age, overweight   Behavior:  cooperative, calm, good eye contact   Speech:  soft, perseverative   Mood:   Depressed   Affect:  constricted, slightly tearful   Thought Process:  organized, logical, goal directed, linear, normal rate of thoughts   Associations: intact associations   Thought Content:  no overt delusions   Perceptual Disturbances: Denies auditory or visual hallucinations and Does not appear to be responding to internal stimuli   Risk Potential: Suicidal ideation - Yes, passive death wish, contracts for safety on the unit, would talk to staff if not feeling safe on the unit, No active suicidal ideation  Homicidal ideation - None  Potential for aggression - No   Sensorium:  oriented to person, place, time/date and situation   Memory:  recent and remote memory grossly intact   Consciousness:  alert and awake   Attention/Concentration: attention span and concentration are age appropriate   Insight:  limited   Judgment: limited   Gait/Station: normal gait/station, normal balance   Motor Activity: no abnormal movements       Current Medications:  Current Facility-Administered Medications   Medication Dose Route Frequency Provider Last Rate   • aluminum-magnesium hydroxide-simethicone  30 mL Oral Q4H PRN Pilar Pilar, DO     • atorvastatin  40 mg Oral Daily With Jeffery Nice PA-C     • benztropine  1 mg Intramuscular Q4H PRN Max 6/day Pilar Pilar, DO     • benztropine  1 mg Oral Q4H PRN Max 6/day Pilar Pilar, DO     • bisacodyl  10 mg Rectal Daily PRN Isabella Avila MD     • busPIRone  5 mg Oral BID Isabella Avila MD     • hydrOXYzine HCL  50 mg Oral Q6H PRN Max 4/day Pilar Pilar, DO      Or   • diphenhydrAMINE  50 mg Intramuscular Q6H PRN Pilar Pilar, DO     • glycerin-hypromellose-  1 drop Both Eyes Q3H PRN Pilar Pilar, DO     • hydrOXYzine HCL  100 mg Oral Q6H PRN Max 4/day Pilar Pilar, DO      Or   • LORazepam  2 mg Intramuscular Q6H PRN Tresia Peel, DO     • hydrOXYzine HCL  25 mg Oral Q6H PRN Max 4/day Tresia Peel, DO     • ibuprofen  600 mg Oral Q6H PRN Tresia Peel, DO     • ibuprofen  800 mg Oral Q8H PRN Tresia Peel, DO     • levothyroxine  75 mcg Oral Early Morning Merwyn Shows REINIER Roche     • losartan  50 mg Oral Daily Merwyn Shows REINIER Roche     • naproxen  500 mg Oral BID PRN Jennifer Chou MD     • OLANZapine  5 mg Oral Q4H PRN Max 3/day Tresia Peel, DO      Or   • OLANZapine  2.5 mg Intramuscular Q4H PRN Max 3/day Tresia Peel, DO     • OLANZapine  5 mg Oral Q3H PRN Max 3/day Tresia Peel, DO      Or   • OLANZapine  5 mg Intramuscular Q3H PRN Max 3/day Lalit Bucca, DO     • OLANZapine  2.5 mg Oral Q4H PRN Max 6/day Lalit Bucca, DO     • polyethylene glycol  17 g Oral Daily PRN Jennifer Chou MD     • propranolol  10 mg Oral Q8H PRN Tresia Peel, DO     • senna-docusate sodium  1 tablet Oral Daily PRN Tresia Peel, DO     • sertraline  25 mg Oral Daily Jennifer Chou MD     • traZODone  50 mg Oral HS PRN Jennifer Chou MD         Behavioral Health Medications: All current active meds have been reviewed. Changes as in plan section above. Risks, benefits and possible side effects of Medications:   Risks, benefits, and possible side effects of medications explained to patient and patient verbalizes understanding. Faith Wilkins DO 07/15/23  Psychiatry Resident, PGY-IV    This note was completed in part utilizing [employer] Direct Software. Grammatical, translation, syntax errors, random word insertions, spelling mistakes, and incomplete sentences may be an occasional consequence of this system secondary to software limitations with voice recognition, ambient noise, and hardware issues.  If you have any questions or concerns about the content, text, or information contained within the body of this dictation, please contact the provider for clarification.

## 2023-07-15 NOTE — TREATMENT TEAM
07/15/23 0900   Team Meeting   Meeting Type Daily Rounds   Team Members Present   Team Members Present Physician;Nurse   Physician Team Member Dr. Maryanne Viera Team Member Angy Esposito   Patient/Family Present   Patient Present No   Patient's Family Present No       AM rounds- tearful, missing . Denies SI/HI, attends groups. Social with select peers.  Awake since 4am.

## 2023-07-15 NOTE — NURSING NOTE
Patient had difficulty maintaining sleep on shift. Restless. woke early this morning reporting her roommate is snoring. Offered sound machine and ear plugs. Declined both. Currently walking the halls quietly.

## 2023-07-16 PROCEDURE — 99232 SBSQ HOSP IP/OBS MODERATE 35: CPT | Performed by: PSYCHIATRY & NEUROLOGY

## 2023-07-16 RX ADMIN — TRAZODONE HYDROCHLORIDE 50 MG: 50 TABLET ORAL at 21:14

## 2023-07-16 RX ADMIN — NAPROXEN 500 MG: 500 TABLET ORAL at 21:16

## 2023-07-16 RX ADMIN — ATORVASTATIN CALCIUM 40 MG: 40 TABLET, FILM COATED ORAL at 17:26

## 2023-07-16 RX ADMIN — BUSPIRONE HYDROCHLORIDE 5 MG: 5 TABLET ORAL at 17:26

## 2023-07-16 NOTE — TREATMENT TEAM
23 1000   Team Meeting   Meeting Type Daily Rounds   Team Members Present   Team Members Present Physician;Nurse   Physician Team Member Haylie/Renetta   Nursing Team Member Dino   Patient/Family Present   Patient Present No   Patient's Family Present No       AM Rounds: yesterday  Anniversary,  . Tearful, depressed, anxious. Refused medications AM and at bedtime, refused again this morning, wanting to speak with provider first. Difficulty with staff overnight with q7 min rounds, closing door, awake briefly walking halls, loudly crying.

## 2023-07-16 NOTE — NURSING NOTE
Patient sitting in the rocking chair, appears more relaxed and focused. Patient stated, "I wanted to apologize to you and the rest of the staff. My head is telling me not to take my medication but my heart is telling me to. I will journal tonight and I wont be sleeping in the russo.  I am going to take my medication and I am going to apologize to my roommate."

## 2023-07-16 NOTE — NURSING NOTE
Early in shift pt had apologized and expressed remorse for earlier outburst. Discussed coping strategies and pt hopeful for continued improvement with medication regimen. Pt states they will stay for treatment and rescinded 72hr request. Pt states they then spoke to the roommate and apologized for being rude upon peer's arrival. Pt can be intrusive during staff conversations with other peers, easily redirected. Pt is visible and social throughout evening. Took evening medications.

## 2023-07-16 NOTE — PROGRESS NOTES
Progress Note - [address]  47 y.o. female MRN: [id number]  Unit/Bed#: [facility] 211-02 Encounter: [id number]    Assessment/Plan   Principal Problem:    Recurrent major depressive disorder ([address] )  Active Problems:    Anxiety    Moderate protein-calorie malnutrition (HCC)    Medical clearance for psychiatric admission    Hypothyroidism    Hypertension    Hyperlipidemia      Recommended Treatment:   No psychopharmacologic changes necessary at this moment; will continue to assess daily for further optimization. Continue encouraging patient to restart taking medications. Continue with pharmacotherapy, group therapy, milieu therapy and occupational therapy. Continue to assess for adverse medication side effects. Encourage Peymanbabar Olivia to participate in nonverbal forms of therapy including journaling and art/music therapy. Continue frequent safety checks and vitals per unit protocol. Continue to engage CM/SW to assist with collateral, disposition planning, and the implementation of an individualized, patient-centered plan of care. Continue medical management by medical team.    Discharge disposition:  Per primary team and case management  Case discussed with treatment team.    ------------------------------------------------------------    Subjective: All documentation including nursing notes, medication history to ensure medication adherence on the unit, labs, and vitals were reviewed. Vikki Wade was evaluated this morning for continuity of care and no acute distress noted throughout the evaluation. Over the past 24 hours, staff noted the patient has been cooperative on the unit and compliant with medications with no acute events overnight.   Nursing reported patient refused morning and night medications and was angry at times in the evening due to q. 7 rounds where the door was left open and patient was particular about like coming in and sound in the hallways affecting her sleep and causing irritability. Today on exam, the patient was seen in the consult room and reports feeling "alright."  Patient did however report having a bad night again and stated "freaking nurse kept the door open and the light shining on my face."  Patient was perseverative about her bedroom door staying open with the bright lights coming in, noise machine was not sufficient to drown out another peer snoring on the unit. Patient reports ongoing ruminating thoughts and passive SI but denied any active SI/HI, plan, or intent, no AVH. Patient reported she is not sure why she does not want to take her medications, stating "I cannot explain it, I was afraid of pills in the past and I started counting pills and I do not want to do that again." Patient was easily redirectable, discussed irrational emotions and goalsetting as well as motivating factors of son and grandson and having a good conversation with them, patient especially loves her 35-year-old and 6month-old grandchildren. Today, Mariela Sosa is consenting for safety on the unit. Progress Toward Goals: Very slow improvement    Psychiatric Review of Systems:  Behavior over the last 24 hours:  unchanged  Sleep: slept off and on, difficulty falling asleep, frequent awakenings  Appetite: normal  Medication side effects: No   ROS: no complaints, all other systems are negative    Vital signs in last 24 hours:  Temp:  [98 °F (36.7 °C)] 98 °F (36.7 °C)  HR:  [64-73] 73  Resp:  [16-18] 16  BP: (140-144)/() 140/87    Laboratory results:  I have personally reviewed all pertinent laboratory/tests results. No results found for this or any previous visit (from the past 48 hour(s)).       Mental Status Evaluation:    Appearance:  age appropriate, casually dressed, looks stated age, overweight   Behavior:  angry, still cooperative, easily redirectable, fair eye contact   Speech:  normal rate and volume, hypertalkative, perseverative   Mood:  "Doing alright"   Affect:  constricted, Irritable edge   Thought Process:  organized, logical, goal directed, linear, perseverative   Associations: intact associations   Thought Content:  no overt delusions, negative thinking, ruminations   Perceptual Disturbances: Denies auditory or visual hallucinations and Does not appear to be responding to internal stimuli   Risk Potential: Suicidal ideation - Yes, passive death wish, contracts for safety on the unit, would talk to staff if not feeling safe on the unit, No active suicidal ideation  Homicidal ideation - None  Potential for aggression - No   Sensorium:  oriented to person, place, time/date and situation   Memory:  recent and remote memory grossly intact   Consciousness:  alert and awake   Attention/Concentration: attention span and concentration are age appropriate   Insight:  limited   Judgment: limited   Gait/Station: normal gait/station, normal balance   Motor Activity: no abnormal movements       Current Medications:  Current Facility-Administered Medications   Medication Dose Route Frequency Provider Last Rate   • aluminum-magnesium hydroxide-simethicone  30 mL Oral Q4H PRN Sherry Concepcion, DO     • atorvastatin  40 mg Oral Daily With Jeffery Nice PA-C     • benztropine  1 mg Intramuscular Q4H PRN Max 6/day Sherry Concepcion, DO     • benztropine  1 mg Oral Q4H PRN Max 6/day Sherry Jamey, DO     • bisacodyl  10 mg Rectal Daily PRN Andrzej Fernández MD     • busPIRone  5 mg Oral BID Andrzej Fernández MD     • hydrOXYzine HCL  50 mg Oral Q6H PRN Max 4/day Sherry Concepcion, DO      Or   • diphenhydrAMINE  50 mg Intramuscular Q6H PRN Sherry Concepcion, DO     • glycerin-hypromellose-  1 drop Both Eyes Q3H PRN Sherry Concepcion, DO     • hydrOXYzine HCL  100 mg Oral Q6H PRN Max 4/day Sherry Concepcion, DO      Or   • LORazepam  2 mg Intramuscular Q6H PRN Sherry Concepcion, DO     • hydrOXYzine HCL  25 mg Oral Q6H PRN Max 4/day Lalit Graham, DO     • ibuprofen  600 mg Oral Q6H PRN Villanueva Settler, DO     • ibuprofen  800 mg Oral Q8H PRN Villanueva Settler, DO     • levothyroxine  75 mcg Oral Early Morning Gerhardt Reamer [place], PA-C     • losartan  50 mg Oral Daily Gerhardt Reamer [place], [place]     • naproxen  500 mg Oral BID PRN Maliha Anne MD     • OLANZapine  5 mg Oral Q4H PRN Max 3/day Villanueva Settler, DO      Or   • OLANZapine  2.5 mg Intramuscular Q4H PRN Max 3/day Villanueva Settler, DO     • OLANZapine  5 mg Oral Q3H PRN Max 3/day Villanueva Settler, DO      Or   • OLANZapine  5 mg Intramuscular Q3H PRN Max 3/day Lalit Bucca, DO     • OLANZapine  2.5 mg Oral Q4H PRN Max 6/day Lalit Bucca, DO     • polyethylene glycol  17 g Oral Daily PRN Maliha Anne MD     • propranolol  10 mg Oral Q8H PRN Villanueva Settler, DO     • senna-docusate sodium  1 tablet Oral Daily PRN Villanueva Settler, DO     • sertraline  25 mg Oral Daily Maliha Anne MD     • traZODone  50 mg Oral HS PRN Maliha Anne MD         Behavioral Health Medications: All current active meds have been reviewed. Changes as in plan section above. Risks, benefits and possible side effects of Medications:   Risks, benefits, and possible side effects of medications explained to patient and patient verbalizes understanding. Brendan Wyatt DO 07/16/23  Psychiatry Resident, PGY-IV    This note was completed in part utilizing [employer] Direct Software. Grammatical, translation, syntax errors, random word insertions, spelling mistakes, and incomplete sentences may be an occasional consequence of this system secondary to software limitations with voice recognition, ambient noise, and hardware issues. If you have any questions or concerns about the content, text, or information contained within the body of this dictation, please contact the provider for clarification.

## 2023-07-16 NOTE — NURSING NOTE
Patient is visible in the milieu. Reporting increased depression and anxiety due to it being her wedding anniversary and the death of her  has been weighing on her today. Reported a bright spot in her day when her son called her and she was able to speak with her grandson. Denies SI HI and hallucinations. Reports she will not be taking her medications tonight because she doesn't want them. Attempts to discuss further dismissed. Denies questions or concerns beyond increased depression missing her . Staff availability reinforced.

## 2023-07-16 NOTE — NURSING NOTE
When writer began assigned q 7 min. Rounds at 0300, pt's door was completely closed. Opened enough so that pt & respirations could be viewed, but each time around, pt.s door was again closed. When writer reminded pt. That the door must remain open enough so that we could view her, she became overly dramatic, stating, "then I'll walk." When next RN began rounds, pt was sitting on the floor & needed reminding that sitting on the floor would block egress in an emergency. Other RN also showed pt. How far open the door needed to be. Each suggestion that was given, was met with a negative reaction, but was able to resume sleep within 10 mins. & remains asleep presently. Will continue to monitor.

## 2023-07-16 NOTE — NURSING NOTE
Pt requesting to sign a 72hr, states, "I was going to listen and do everything you guys tell me to do. I was even going to take my meds. But the second you did that, (pointing to room) gave me a roommate. I'm done". Pt reminded of a previous roommate and that this a hospital setting. Pt blaming staff and new roommate. Pt talking about sleeping in hallway, this was redirected.  Pt states, "then I won't sleep for the next three days until I get out of here."     72hr initiated 7/16/23 at 1150

## 2023-07-16 NOTE — PLAN OF CARE
Problem: DISCHARGE PLANNING  Goal: Discharge to home or other facility with appropriate resources  Description: INTERVENTIONS:  - Identify barriers to discharge w/patient and caregiver  - Arrange for needed discharge resources and transportation as appropriate  - Identify discharge learning needs (meds, wound care, etc.)  - Arrange for interpretive services to assist at discharge as needed  - Refer to Case Management Department for coordinating discharge planning if the patient needs post-hospital services based on physician/advanced practitioner order or complex needs related to functional status, cognitive ability, or social support system  Outcome: Progressing     Problem: Ineffective Coping  Goal: Identifies healthy coping skills  Outcome: Progressing  Goal: Participates in unit activities  Description: Interventions:  - Provide therapeutic environment   - Provide required programming   - Redirect inappropriate behaviors   Outcome: Progressing     Problem: Depression  Goal: Refrain from isolation  Description: Interventions:  - Develop a trusting relationship   - Encourage socialization   Outcome: Progressing  Goal: Refrain from self-neglect  Outcome: Progressing  Goal: Complete daily ADLs, including personal hygiene independently, as able  Description: Interventions:  - Observe, teach, and assist patient with ADLS  -  Monitor and promote a balance of rest/activity, with adequate nutrition and elimination   Outcome: Progressing     Problem: Anxiety  Goal: Anxiety is at manageable level  Description: Interventions:  - Assess and monitor patient's anxiety level. - Monitor for signs and symptoms (heart palpitations, chest pain, shortness of breath, headaches, nausea, feeling jumpy, restlessness, irritable, apprehensive). - Collaborate with interdisciplinary team and initiate plan and interventions as ordered.   - Justice patient to unit/surroundings  - Explain treatment plan  - Encourage participation in care  - Encourage verbalization of concerns/fears  - Identify coping mechanisms  - Assist in developing anxiety-reducing skills  - Administer/offer alternative therapies  - Limit or eliminate stimulants  Outcome: Progressing     Problem: Nutrition/Hydration-ADULT  Goal: Nutrient/Hydration intake appropriate for improving, restoring or maintaining nutritional needs  Description: Monitor and assess patient's nutrition/hydration status for malnutrition. Collaborate with interdisciplinary team and initiate plan and interventions as ordered. Monitor patient's weight and dietary intake as ordered or per policy. Utilize nutrition screening tool and intervene as necessary. Determine patient's food preferences and provide high-protein, high-caloric foods as appropriate.      INTERVENTIONS:  - Monitor oral intake, urinary output, labs, and treatment plans  - Assess nutrition and hydration status and recommend course of action  - Evaluate amount of meals eaten  - Assist patient with eating if necessary   - Allow adequate time for meals  - Recommend/ encourage appropriate diets, oral nutritional supplements, and vitamin/mineral supplements  - Order, calculate, and assess calorie counts as needed  - Recommend, monitor, and adjust tube feedings and TPN/PPN based on assessed needs  - Assess need for intravenous fluids  - Provide specific nutrition/hydration education as appropriate  - Include patient/family/caregiver in decisions related to nutrition  Outcome: Progressing

## 2023-07-16 NOTE — NURSING NOTE
Patient was walking in the r[place] after group. She said that she was going to take her medications tonight. She participated in group and set goals that she was satisfied with. Requested her bed be moved from the door to the window d/t increased light and noise disturbance at night. Denies SI, HI and hallucinations.

## 2023-07-16 NOTE — NURSING NOTE
Patient became agitated after getting a roommate. She started walking the halls and stated "you people don't understand. I live alone and cannot sleep next to anyone else." Interviewer walked in the [place] and allowed her to verbalize her frustrations; PRN medication was offered and refused.  Patient stated, "this is going to stop me from taking any medication tonight."

## 2023-07-16 NOTE — PLAN OF CARE
Problem: DISCHARGE PLANNING  Goal: Discharge to home or other facility with appropriate resources  Description: INTERVENTIONS:  - Identify barriers to discharge w/patient and caregiver  - Arrange for needed discharge resources and transportation as appropriate  - Identify discharge learning needs (meds, wound care, etc.)  - Arrange for interpretive services to assist at discharge as needed  - Refer to Case Management Department for coordinating discharge planning if the patient needs post-hospital services based on physician/advanced practitioner order or complex needs related to functional status, cognitive ability, or social support system  Outcome: Progressing     Problem: Ineffective Coping  Goal: Identifies healthy coping skills  Outcome: Progressing  Goal: Participates in unit activities  Description: Interventions:  - Provide therapeutic environment   - Provide required programming   - Redirect inappropriate behaviors   Outcome: Progressing     Problem: Depression  Goal: Refrain from isolation  Description: Interventions:  - Develop a trusting relationship   - Encourage socialization   Outcome: Progressing  Goal: Refrain from self-neglect  Outcome: Progressing  Goal: Complete daily ADLs, including personal hygiene independently, as able  Description: Interventions:  - Observe, teach, and assist patient with ADLS  -  Monitor and promote a balance of rest/activity, with adequate nutrition and elimination   Outcome: Progressing     Problem: Anxiety  Goal: Anxiety is at manageable level  Description: Interventions:  - Assess and monitor patient's anxiety level. - Monitor for signs and symptoms (heart palpitations, chest pain, shortness of breath, headaches, nausea, feeling jumpy, restlessness, irritable, apprehensive). - Collaborate with interdisciplinary team and initiate plan and interventions as ordered.   - [place] patient to unit/surroundings  - Explain treatment plan  - Encourage participation in care  - Encourage verbalization of concerns/fears  - Identify coping mechanisms  - Assist in developing anxiety-reducing skills  - Administer/offer alternative therapies  - Limit or eliminate stimulants  Outcome: Progressing     Problem: Nutrition/Hydration-ADULT  Goal: Nutrient/Hydration intake appropriate for improving, restoring or maintaining nutritional needs  Description: Monitor and assess patient's nutrition/hydration status for malnutrition. Collaborate with interdisciplinary team and initiate plan and interventions as ordered. Monitor patient's weight and dietary intake as ordered or per policy. Utilize nutrition screening tool and intervene as necessary. Determine patient's food preferences and provide high-protein, high-caloric foods as appropriate.      INTERVENTIONS:  - Monitor oral intake, urinary output, labs, and treatment plans  - Assess nutrition and hydration status and recommend course of action  - Evaluate amount of meals eaten  - Assist patient with eating if necessary   - Allow adequate time for meals  - Recommend/ encourage appropriate diets, oral nutritional supplements, and vitamin/mineral supplements  - Order, calculate, and assess calorie counts as needed  - Recommend, monitor, and adjust tube feedings and TPN/PPN based on assessed needs  - Assess need for intravenous fluids  - Provide specific nutrition/hydration education as appropriate  - Include patient/family/caregiver in decisions related to nutrition  Outcome: Progressing

## 2023-07-17 PROCEDURE — 99232 SBSQ HOSP IP/OBS MODERATE 35: CPT | Performed by: STUDENT IN AN ORGANIZED HEALTH CARE EDUCATION/TRAINING PROGRAM

## 2023-07-17 RX ADMIN — TRAZODONE HYDROCHLORIDE 50 MG: 50 TABLET ORAL at 21:51

## 2023-07-17 RX ADMIN — BUSPIRONE HYDROCHLORIDE 5 MG: 5 TABLET ORAL at 17:02

## 2023-07-17 RX ADMIN — LEVOTHYROXINE SODIUM 75 MCG: 75 TABLET ORAL at 06:13

## 2023-07-17 RX ADMIN — LOSARTAN POTASSIUM 50 MG: 50 TABLET, FILM COATED ORAL at 09:00

## 2023-07-17 RX ADMIN — NAPROXEN 500 MG: 500 TABLET ORAL at 21:51

## 2023-07-17 RX ADMIN — BUSPIRONE HYDROCHLORIDE 5 MG: 5 TABLET ORAL at 09:00

## 2023-07-17 RX ADMIN — ATORVASTATIN CALCIUM 40 MG: 40 TABLET, FILM COATED ORAL at 17:02

## 2023-07-17 RX ADMIN — SERTRALINE HYDROCHLORIDE 25 MG: 25 TABLET ORAL at 09:00

## 2023-07-17 NOTE — PROGRESS NOTES
Attended group alongside other people on the unit, participated in discussion around recovery-centered topic, and contributed their own lived experience toward connection between group members. 07/17/23 1230   Activity/Group Checklist   Group Other (Comment)  (12:30 PM Lifeskills and 5:00 PM 12 Step Groups)   Attendance Attended   Attendance Duration (min) Greater than 60  (> 105 min)   Interactions Interacted appropriately   Affect/Mood Appropriate   Goals Achieved Other (Comment)  (Engaged with CPS and others by identifying hopes, fears, regrets, prides and convictions.  Practiced mutual support and validation during reading of 12 steps and sharing of lived experience of addiction impacting lives.)

## 2023-07-17 NOTE — TREATMENT TEAM
07/17/23 1000   Activity/Group Checklist   Group Exercise   Attendance Attended   Attendance Duration (min) 16-30   Interactions Interacted appropriately   Affect/Mood Appropriate   Goals Achieved Able to engage in interactions; Able to listen to others; Other (Comment)  (walked laps on the unit with peers and this therapist)

## 2023-07-17 NOTE — PROGRESS NOTES
Attended group alongside other people on the unit, participated in discussion around recovery-centered topic, and took a leadership role in supporting others with their understanding of group materials. 07/14/23 1230   Activity/Group Checklist   Group Other (Comment)  (12:30 PM Lifeskills, 3:30 PM Goal Accomplishments, 5:30 PM Structured Journaling Groups)   Attendance Attended   Attendance Duration (min) Greater than 60  (> 150 min)   Interactions Interacted appropriately   Affect/Mood Appropriate   Goals Achieved Other (Comment)  (Engaged with CPS and others to explore facts about 15 lesser known mental health conditions. Built pride around nine different types of intelligence, self awareness, and connection.  Discovered 10 types of journaling to share lived experience in recovery.)

## 2023-07-17 NOTE — TREATMENT TEAM
07/17/23 1030   Activity/Group Checklist   Group Other (Comment)  (art therapy)   Attendance Attended   Attendance Duration (min) 46-60   Interactions Interacted appropriately   Affect/Mood Appropriate   Goals Achieved Able to engage in interactions; Able to listen to others; Other (Comment)  (authentic, spontaneous self expression, connection and insight)

## 2023-07-17 NOTE — NURSING NOTE
Pt visible, social and attending groups. Reports overall having a "good day", but feeling anxious about possible discharge later this week. Denies SI/HI/AVH. Compliant with medications this evening.

## 2023-07-17 NOTE — NURSING NOTE
Pt received Trazodone 50 mg PO at 2114 for sleep. PRN effective, pt appears to be sleeping on reassessment.

## 2023-07-17 NOTE — PROGRESS NOTES
[facility] Group Note     07/17/23 1400   Activity/Group Checklist   Group Life Skills  (Finding the [employer] - Questioning [employer] Thoughts)   Attendance Attended   Attendance Duration (min) 46-60   Interactions Interacted appropriately   Affect/Mood Appropriate;Bright  (Focused and motivated)   Goals Achieved Identified feelings; Identified triggers; Discussed coping strategies; Able to listen to others; Able to engage in interactions; Able to reflect/comment on own behavior;Able to self-disclose; Able to recieve feedback; Able to give feedback to another

## 2023-07-17 NOTE — PROGRESS NOTES
Progress Note - [address] 47 y.o. female MRN: [id number]  Unit/Bed#: [facility] 211-02 Encounter: [id number]    Assessment/Plan   Principal Problem:    Recurrent major depressive disorder ([address])  Active Problems:    Anxiety    Moderate protein-calorie malnutrition (HCC)    Medical clearance for psychiatric admission    Hypothyroidism    Hypertension    Hyperlipidemia      Subjective: Patient was seen, chart was reviewed, and case was discussed with the team. Patient states that she had rough weekend. Patient says that she stopped medications on Saturday as she didn't care for her health and didn't matter if she is alive. Reports flipping on the staff. Reports that it was due to her anniversary, How ever she is feeling ok today. She is compliant with medications. Denies any side effects. Denies any thoughts to hurt self or others. Sleep has improved with Trazodone.    Behavior over the last 24 hours:  regressed  Sleep: normal  Appetite: normal  Medication side effects: No    Medical ROS: Pertinent items are noted in HPI.all other systems are negative    Current Medications:  Current Facility-Administered Medications   Medication Dose Route Frequency   • aluminum-magnesium hydroxide-simethicone (MAALOX) oral suspension 30 mL  30 mL Oral Q4H PRN   • atorvastatin (LIPITOR) tablet 40 mg  40 mg Oral Daily With Dinner   • benztropine (COGENTIN) injection 1 mg  1 mg Intramuscular Q4H PRN Max 6/day   • benztropine (COGENTIN) tablet 1 mg  1 mg Oral Q4H PRN Max 6/day   • bisacodyl (DULCOLAX) rectal suppository 10 mg  10 mg Rectal Daily PRN   • busPIRone (BUSPAR) tablet 5 mg  5 mg Oral BID   • hydrOXYzine HCL (ATARAX) tablet 50 mg  50 mg Oral Q6H PRN Max 4/day    Or   • diphenhydrAMINE (BENADRYL) injection 50 mg  50 mg Intramuscular Q6H PRN   • glycerin-hypromellose- (ARTIFICIAL TEARS) ophthalmic solution 1 drop  1 drop Both Eyes Q3H PRN   • hydrOXYzine HCL (ATARAX) tablet 100 mg  100 mg Oral Q6H PRN Max 4/day    Or   • LORazepam (ATIVAN) injection 2 mg  2 mg Intramuscular Q6H PRN   • hydrOXYzine HCL (ATARAX) tablet 25 mg  25 mg Oral Q6H PRN Max 4/day   • ibuprofen (MOTRIN) tablet 600 mg  600 mg Oral Q6H PRN   • ibuprofen (MOTRIN) tablet 800 mg  800 mg Oral Q8H PRN   • levothyroxine tablet 75 mcg  75 mcg Oral Early Morning   • losartan (COZAAR) tablet 50 mg  50 mg Oral Daily   • naproxen (NAPROSYN) tablet 500 mg  500 mg Oral BID PRN   • OLANZapine (ZyPREXA) tablet 5 mg  5 mg Oral Q4H PRN Max 3/day    Or   • OLANZapine (ZyPREXA) IM injection 2.5 mg  2.5 mg Intramuscular Q4H PRN Max 3/day   • OLANZapine (ZyPREXA) tablet 5 mg  5 mg Oral Q3H PRN Max 3/day    Or   • OLANZapine (ZyPREXA) IM injection 5 mg  5 mg Intramuscular Q3H PRN Max 3/day   • OLANZapine (ZyPREXA) tablet 2.5 mg  2.5 mg Oral Q4H PRN Max 6/day   • polyethylene glycol (MIRALAX) packet 17 g  17 g Oral Daily PRN   • propranolol (INDERAL) tablet 10 mg  10 mg Oral Q8H PRN   • senna-docusate sodium (SENOKOT S) 8.6-50 mg per tablet 1 tablet  1 tablet Oral Daily PRN   • sertraline (ZOLOFT) tablet 25 mg  25 mg Oral Daily   • traZODone (DESYREL) tablet 50 mg  50 mg Oral HS PRN       Behavioral Health Medications:   all current active meds have been reviewed. Vitals:  Vitals:    07/17/23 0740   BP: 129/91   Pulse: 69   Resp: 18   Temp: (!) 97.4 °F (36.3 °C)   SpO2:        Laboratory results:    I have personally reviewed all pertinent laboratory/tests results.     Mental Status Evaluation:    Appearance:  age appropriate   Behavior:  restless and fidgety   Speech:  soft   Mood:  anxious and depressed   Affect:  blunted, constricted and dysphoric   Thought Process:  goal directed and logical   Thought Content:  normal   Perceptual Disturbances: None   Risk Potential: Suicidal Ideations none  Homicidal Ideations none  Potential for Aggression No   Sensorium:  person, place and time/date   Memory:  recent and remote memory grossly intact   Consciousness:  alert and awake    Attention: attention span appeared shorter than expected for age   Insight:  limited   Judgment: limited   Gait/Station: normal gait/station   Motor Activity: no abnormal movements       Progress Toward Goals: Progressing    Recommended Treatment: Continue with pharmacotherapy, group therapy, milieu therapy and occupational therapy. Risks, benefits and possible side effects of Medications:   Risks, benefits, alternatives, and possible side effects of patient's psychiatric medications were discussed with patient.

## 2023-07-17 NOTE — PLAN OF CARE
Problem: Ineffective Coping  Goal: Identifies healthy coping skills  Outcome: Progressing  Goal: Participates in unit activities  Description: Interventions:  - Provide therapeutic environment   - Provide required programming   - Redirect inappropriate behaviors   Outcome: Progressing     Problem: Depression  Goal: Refrain from isolation  Description: Interventions:  - Develop a trusting relationship   - Encourage socialization   Outcome: Progressing  Goal: Refrain from self-neglect  Outcome: Progressing  Goal: Complete daily ADLs, including personal hygiene independently, as able  Description: Interventions:  - Observe, teach, and assist patient with ADLS  -  Monitor and promote a balance of rest/activity, with adequate nutrition and elimination   Outcome: Progressing     Problem: Anxiety  Goal: Anxiety is at manageable level  Description: Interventions:  - Assess and monitor patient's anxiety level. - Monitor for signs and symptoms (heart palpitations, chest pain, shortness of breath, headaches, nausea, feeling jumpy, restlessness, irritable, apprehensive). - Collaborate with interdisciplinary team and initiate plan and interventions as ordered. - [place] patient to unit/surroundings  - Explain treatment plan  - Encourage participation in care  - Encourage verbalization of concerns/fears  - Identify coping mechanisms  - Assist in developing anxiety-reducing skills  - Administer/offer alternative therapies  - Limit or eliminate stimulants  Outcome: Progressing     Problem: Nutrition/Hydration-ADULT  Goal: Nutrient/Hydration intake appropriate for improving, restoring or maintaining nutritional needs  Description: Monitor and assess patient's nutrition/hydration status for malnutrition. Collaborate with interdisciplinary team and initiate plan and interventions as ordered. Monitor patient's weight and dietary intake as ordered or per policy. Utilize nutrition screening tool and intervene as necessary. Determine patient's food preferences and provide high-protein, high-caloric foods as appropriate.      INTERVENTIONS:  - Monitor oral intake, urinary output, labs, and treatment plans  - Assess nutrition and hydration status and recommend course of action  - Evaluate amount of meals eaten  - Assist patient with eating if necessary   - Allow adequate time for meals  - Recommend/ encourage appropriate diets, oral nutritional supplements, and vitamin/mineral supplements  - Order, calculate, and assess calorie counts as needed  - Recommend, monitor, and adjust tube feedings and TPN/PPN based on assessed needs  - Assess need for intravenous fluids  - Provide specific nutrition/hydration education as appropriate  - Include patient/family/caregiver in decisions related to nutrition  Outcome: Progressing

## 2023-07-17 NOTE — NURSING NOTE
Pt pleasant during interaction. Denies SI/HI/AVH. Pt visible/social on unit and participates in groups. Pt brightens with approach. Manjinder Perry states she had a rough weekend and Reports she regrets acting out over the weekend. Pt states he is possibly discharging this week sometime and is nervous but ready. Pt educated on importance of remaining med compliant and side effects of abruptly stopping medication. Pt plans to remain med compliant upon d/c.

## 2023-07-18 PROCEDURE — 99232 SBSQ HOSP IP/OBS MODERATE 35: CPT | Performed by: STUDENT IN AN ORGANIZED HEALTH CARE EDUCATION/TRAINING PROGRAM

## 2023-07-18 RX ORDER — TRAZODONE HYDROCHLORIDE 100 MG/1
100 TABLET ORAL
Status: DISCONTINUED | OUTPATIENT
Start: 2023-07-18 | End: 2023-07-19

## 2023-07-18 RX ADMIN — BUSPIRONE HYDROCHLORIDE 5 MG: 5 TABLET ORAL at 16:58

## 2023-07-18 RX ADMIN — LOSARTAN POTASSIUM 50 MG: 50 TABLET, FILM COATED ORAL at 09:31

## 2023-07-18 RX ADMIN — NAPROXEN 500 MG: 500 TABLET ORAL at 21:54

## 2023-07-18 RX ADMIN — LEVOTHYROXINE SODIUM 75 MCG: 75 TABLET ORAL at 06:37

## 2023-07-18 RX ADMIN — SERTRALINE HYDROCHLORIDE 25 MG: 25 TABLET ORAL at 09:31

## 2023-07-18 RX ADMIN — BUSPIRONE HYDROCHLORIDE 5 MG: 5 TABLET ORAL at 09:31

## 2023-07-18 RX ADMIN — ATORVASTATIN CALCIUM 40 MG: 40 TABLET, FILM COATED ORAL at 16:58

## 2023-07-18 RX ADMIN — TRAZODONE HYDROCHLORIDE 100 MG: 100 TABLET ORAL at 21:53

## 2023-07-18 NOTE — PROGRESS NOTES
Attended group alongside other people on the unit, participated in discussion around recovery-centered topic, and contributed their own lived experience toward connection between group members. 07/18/23 1700   Activity/Group Checklist   Group Other (Comment)  (5:00 PM Reflective Music Group)   Attendance Attended   Attendance Duration (min) 46-60   Interactions Interacted appropriately   Affect/Mood Appropriate   Goals Achieved Other (Comment)  (Engaged with CPS and other peers on the unit by examining the ways lyrics by [employer] address asking for help when wanting to die. Discovered advocacy of Lucila Rivera in their music and lyrics.  Chose music that impacts personal recovery, and shared why.)

## 2023-07-18 NOTE — NURSING NOTE
Patient requested and received PRN Trazodone 50mg for insomnia at 2151. Medication effective, patient appeared to sleep well overnight. Observed resting in bed with eyes closed, respirations regular, even and non-labored, throughout the night. No signs of distress, arouses easily. Continuous rounding maintained throughout shift for patient safety.

## 2023-07-18 NOTE — CASE MANAGEMENT
CM contacted [facility] in [address] [place] [address] at [phone] to schedule a new Pt appointment. [facility] scheduled Pt for 7/25 @1:30pm to met with Remberto Keita. [facility] stated that the Pt can come in person and then can switch to zoom for sessions. CM met with Pt to inform her of the appointment and confirm discharge. CM stated that they have an appointment scheduled with [facility]. Pt stated that they weren't sure if they want to go to [facility] because it is a bit far from where she lives. Pt stated that their aunt was going to give her a number for a place in [facility] which is closer for her. CM asked that she call her aunt to get the number so they can call and get an appointment scheduled. Pt provided Aftab Abdistevenson number for [facility]. CM stated that they will give them a call. CM stated that if they received a call back they will scheduled and then cancel with [facility]. CM stated that if they don't call back, the Pt can give them a follow up call and if they scheduled, they can call and cancel with [facility]. Pt verbalized agreement and understanding. CM called Aftab Grider [phone] and left a voicemail.

## 2023-07-18 NOTE — TREATMENT TEAM
07/18/23 0930   Activity/Group Checklist   Group Exercise   Attendance Attended   Attendance Duration (min) 16-30   Interactions Interacted appropriately   Affect/Mood Appropriate   Goals Achieved Able to listen to others; Able to engage in interactions; Other (Comment)  (fully participated in stretches and light workout)

## 2023-07-18 NOTE — PROGRESS NOTES
Progress Note - [address] 47 y.o. female MRN: [id number]  Unit/Bed#: [facility] 211-02 Encounter: [id number]    Assessment/Plan   Principal Problem:    Recurrent major depressive disorder ([address])  Active Problems:    Anxiety    Moderate protein-calorie malnutrition (HCC)    Medical clearance for psychiatric admission    Hypothyroidism    Hypertension    Hyperlipidemia      Subjective: Patient was seen, chart was reviewed, and case was discussed with the team. Patient appears calm, cooperative, anxious and tearful. She continues to endorse depressed mood and worried if she can handle her real life stressors. Sleep was disturbed. Appetite is ok. She is compliant with medications. Denies any side effects.    Behavior over the last 24 hours:  unchanged  Sleep: disturbed  Appetite: normal  Medication side effects: No    Medical ROS: Pertinent items are noted in HPI.no complaints    Current Medications:  Current Facility-Administered Medications   Medication Dose Route Frequency   • aluminum-magnesium hydroxide-simethicone (MAALOX) oral suspension 30 mL  30 mL Oral Q4H PRN   • atorvastatin (LIPITOR) tablet 40 mg  40 mg Oral Daily With Dinner   • benztropine (COGENTIN) injection 1 mg  1 mg Intramuscular Q4H PRN Max 6/day   • benztropine (COGENTIN) tablet 1 mg  1 mg Oral Q4H PRN Max 6/day   • bisacodyl (DULCOLAX) rectal suppository 10 mg  10 mg Rectal Daily PRN   • busPIRone (BUSPAR) tablet 5 mg  5 mg Oral BID   • hydrOXYzine HCL (ATARAX) tablet 50 mg  50 mg Oral Q6H PRN Max 4/day    Or   • diphenhydrAMINE (BENADRYL) injection 50 mg  50 mg Intramuscular Q6H PRN   • glycerin-hypromellose- (ARTIFICIAL TEARS) ophthalmic solution 1 drop  1 drop Both Eyes Q3H PRN   • hydrOXYzine HCL (ATARAX) tablet 100 mg  100 mg Oral Q6H PRN Max 4/day    Or   • LORazepam (ATIVAN) injection 2 mg  2 mg Intramuscular Q6H PRN   • hydrOXYzine HCL (ATARAX) tablet 25 mg  25 mg Oral Q6H PRN Max 4/day   • ibuprofen (MOTRIN) tablet 600 mg  600 mg Oral Q6H PRN   • ibuprofen (MOTRIN) tablet 800 mg  800 mg Oral Q8H PRN   • levothyroxine tablet 75 mcg  75 mcg Oral Early Morning   • losartan (COZAAR) tablet 50 mg  50 mg Oral Daily   • naproxen (NAPROSYN) tablet 500 mg  500 mg Oral BID PRN   • OLANZapine (ZyPREXA) tablet 5 mg  5 mg Oral Q4H PRN Max 3/day    Or   • OLANZapine (ZyPREXA) IM injection 2.5 mg  2.5 mg Intramuscular Q4H PRN Max 3/day   • OLANZapine (ZyPREXA) tablet 5 mg  5 mg Oral Q3H PRN Max 3/day    Or   • OLANZapine (ZyPREXA) IM injection 5 mg  5 mg Intramuscular Q3H PRN Max 3/day   • OLANZapine (ZyPREXA) tablet 2.5 mg  2.5 mg Oral Q4H PRN Max 6/day   • polyethylene glycol (MIRALAX) packet 17 g  17 g Oral Daily PRN   • propranolol (INDERAL) tablet 10 mg  10 mg Oral Q8H PRN   • senna-docusate sodium (SENOKOT S) 8.6-50 mg per tablet 1 tablet  1 tablet Oral Daily PRN   • sertraline (ZOLOFT) tablet 25 mg  25 mg Oral Daily   • traZODone (DESYREL) tablet 100 mg  100 mg Oral HS PRN       Behavioral Health Medications:   all current active meds have been reviewed. Vitals:  Vitals:    07/18/23 0729   BP: 115/87   Pulse: 78   Resp: 18   Temp: 97.8 °F (36.6 °C)   SpO2: 98%       Laboratory results:    I have personally reviewed all pertinent laboratory/tests results.     Mental Status Evaluation:    Appearance:  age appropriate   Behavior:  cooperative   Speech:  soft   Mood:  anxious and depressed   Affect:  constricted and tearful   Thought Process:  goal directed and logical   Thought Content:  normal   Perceptual Disturbances: None   Risk Potential: Suicidal Ideations none  Homicidal Ideations none  Potential for Aggression No   Sensorium:  person, place and time/date   Memory:  recent and remote memory grossly intact   Consciousness:  alert and awake    Attention: attention span appeared shorter than expected for age   Insight:  limited   Judgment: limited   Gait/Station: normal gait/station   Motor Activity: no abnormal movements Progress Toward Goals: Progressing    Recommended Treatment: Continue with pharmacotherapy, group therapy, milieu therapy and occupational therapy. 1. Increase Trazodone 100 mg PRN  Risks, benefits and possible side effects of Medications:   Risks, benefits, alternatives, and possible side effects of patient's psychiatric medications were discussed with patient.

## 2023-07-18 NOTE — PROGRESS NOTES
Attended group alongside other people on the unit, participated in discussion around recovery-centered topic, and contributed their own lived experience toward connection between group members. 07/18/23 1330   Activity/Group Checklist   Group Life Skills  (1:30 PM Lifeskills Group)   Attendance Attended   Attendance Duration (min) 46-60  (Left before end of this group, did not return to this group.)   Interactions Interacted appropriately   Affect/Mood Appropriate   Goals Achieved Other (Comment)  (Engaged with CPS and other peers on the unit by asking questions about online support groups and warmline, by identifying preferred groups, by describing natural support systems.  Explored opportunities to pursue upcoming CPS training in nearby [facility].)

## 2023-07-18 NOTE — PLAN OF CARE
Problem: DISCHARGE PLANNING  Goal: Discharge to home or other facility with appropriate resources  Description: INTERVENTIONS:  - Identify barriers to discharge w/patient and caregiver  - Arrange for needed discharge resources and transportation as appropriate  - Identify discharge learning needs (meds, wound care, etc.)  - Arrange for interpretive services to assist at discharge as needed  - Refer to Case Management Department for coordinating discharge planning if the patient needs post-hospital services based on physician/advanced practitioner order or complex needs related to functional status, cognitive ability, or social support system  Outcome: Progressing     Problem: Ineffective Coping  Goal: Identifies healthy coping skills  Outcome: Progressing  Goal: Participates in unit activities  Description: Interventions:  - Provide therapeutic environment   - Provide required programming   - Redirect inappropriate behaviors   Outcome: Progressing     Problem: Depression  Goal: Refrain from isolation  Description: Interventions:  - Develop a trusting relationship   - Encourage socialization   Outcome: Progressing  Goal: Refrain from self-neglect  Outcome: Progressing  Goal: Complete daily ADLs, including personal hygiene independently, as able  Description: Interventions:  - Observe, teach, and assist patient with ADLS  -  Monitor and promote a balance of rest/activity, with adequate nutrition and elimination   Outcome: Progressing     Problem: Anxiety  Goal: Anxiety is at manageable level  Description: Interventions:  - Assess and monitor patient's anxiety level. - Monitor for signs and symptoms (heart palpitations, chest pain, shortness of breath, headaches, nausea, feeling jumpy, restlessness, irritable, apprehensive). - Collaborate with interdisciplinary team and initiate plan and interventions as ordered.   - [place] patient to unit/surroundings  - Explain treatment plan  - Encourage participation in care  - Encourage verbalization of concerns/fears  - Identify coping mechanisms  - Assist in developing anxiety-reducing skills  - Administer/offer alternative therapies  - Limit or eliminate stimulants  Outcome: Progressing     Problem: Nutrition/Hydration-ADULT  Goal: Nutrient/Hydration intake appropriate for improving, restoring or maintaining nutritional needs  Description: Monitor and assess patient's nutrition/hydration status for malnutrition. Collaborate with interdisciplinary team and initiate plan and interventions as ordered. Monitor patient's weight and dietary intake as ordered or per policy. Utilize nutrition screening tool and intervene as necessary. Determine patient's food preferences and provide high-protein, high-caloric foods as appropriate.      INTERVENTIONS:  - Monitor oral intake, urinary output, labs, and treatment plans  - Assess nutrition and hydration status and recommend course of action  - Evaluate amount of meals eaten  - Assist patient with eating if necessary   - Allow adequate time for meals  - Recommend/ encourage appropriate diets, oral nutritional supplements, and vitamin/mineral supplements  - Order, calculate, and assess calorie counts as needed  - Recommend, monitor, and adjust tube feedings and TPN/PPN based on assessed needs  - Assess need for intravenous fluids  - Provide specific nutrition/hydration education as appropriate  - Include patient/family/caregiver in decisions related to nutrition  Outcome: Progressing

## 2023-07-18 NOTE — NURSING NOTE
Pt has been pleasant, cooperative, attending groups, social with peers. Pt reports feeling anxious about upcoming discharge, feels lonely when at home due to lack of social life. Pt does have son and 2 grandsons living close by. Pt denies SI/HI/AVH at current time.

## 2023-07-18 NOTE — PROGRESS NOTES
[facility] Group Note     07/18/23 1000 07/18/23 1530   Activity/Group Checklist   Group Community meeting  (S.M.A.R.T. Goals) Anger management  (Anger Triggers and Coping Skills)   Attendance Attended Attended   Attendance Duration (min) 16-30 46-60   Interactions Interacted appropriately Interacted appropriately   Affect/Mood Appropriate;Calm  (Focused and motivated) Appropriate;Calm  (Focused and motivated)   Goals Achieved Identified feelings; Discussed coping strategies; Able to listen to others; Able to engage in interactions; Able to reflect/comment on own behavior;Able to self-disclose; Able to recieve feedback; Able to give feedback to another  (developed appropriate goals) Identified feelings; Identified triggers; Discussed coping strategies; Able to listen to others; Able to engage in interactions; Able to reflect/comment on own behavior;Able to self-disclose; Able to recieve feedback; Able to give feedback to another

## 2023-07-19 PROCEDURE — 99232 SBSQ HOSP IP/OBS MODERATE 35: CPT | Performed by: STUDENT IN AN ORGANIZED HEALTH CARE EDUCATION/TRAINING PROGRAM

## 2023-07-19 RX ORDER — LOSARTAN POTASSIUM 25 MG/1
25 TABLET ORAL 2 TIMES DAILY
Status: DISCONTINUED | OUTPATIENT
Start: 2023-07-19 | End: 2023-07-20 | Stop reason: HOSPADM

## 2023-07-19 RX ORDER — DIAZEPAM 5 MG/1
5 TABLET ORAL ONCE
Status: DISCONTINUED | OUTPATIENT
Start: 2023-07-19 | End: 2023-07-19

## 2023-07-19 RX ORDER — LOSARTAN POTASSIUM 50 MG/1
50 TABLET ORAL DAILY
Qty: 30 TABLET | Refills: 0 | Status: CANCELLED | OUTPATIENT
Start: 2023-07-20

## 2023-07-19 RX ORDER — TRAZODONE HYDROCHLORIDE 50 MG/1
50 TABLET ORAL
Status: DISCONTINUED | OUTPATIENT
Start: 2023-07-19 | End: 2023-07-19

## 2023-07-19 RX ORDER — TRAZODONE HYDROCHLORIDE 100 MG/1
100 TABLET ORAL
Status: DISCONTINUED | OUTPATIENT
Start: 2023-07-19 | End: 2023-07-20 | Stop reason: HOSPADM

## 2023-07-19 RX ORDER — MECLIZINE HCL 12.5 MG/1
12.5 TABLET ORAL EVERY 8 HOURS PRN
Status: DISCONTINUED | OUTPATIENT
Start: 2023-07-19 | End: 2023-07-20 | Stop reason: HOSPADM

## 2023-07-19 RX ADMIN — BUSPIRONE HYDROCHLORIDE 5 MG: 5 TABLET ORAL at 09:11

## 2023-07-19 RX ADMIN — SERTRALINE HYDROCHLORIDE 25 MG: 25 TABLET ORAL at 09:11

## 2023-07-19 RX ADMIN — NAPROXEN 500 MG: 500 TABLET ORAL at 22:01

## 2023-07-19 RX ADMIN — LOSARTAN POTASSIUM 25 MG: 25 TABLET, FILM COATED ORAL at 18:32

## 2023-07-19 RX ADMIN — ATORVASTATIN CALCIUM 40 MG: 40 TABLET, FILM COATED ORAL at 16:31

## 2023-07-19 RX ADMIN — BUSPIRONE HYDROCHLORIDE 5 MG: 5 TABLET ORAL at 17:27

## 2023-07-19 RX ADMIN — HYDROXYZINE HYDROCHLORIDE 100 MG: 50 TABLET, FILM COATED ORAL at 16:31

## 2023-07-19 RX ADMIN — LEVOTHYROXINE SODIUM 75 MCG: 75 TABLET ORAL at 05:53

## 2023-07-19 RX ADMIN — TRAZODONE HYDROCHLORIDE 100 MG: 100 TABLET ORAL at 22:00

## 2023-07-19 RX ADMIN — LOSARTAN POTASSIUM 50 MG: 50 TABLET, FILM COATED ORAL at 09:11

## 2023-07-19 NOTE — TREATMENT TEAM
07/18/23 1230   Activity/Group Checklist   Group Other (Comment)  (art therapy)   Attendance Attended   Attendance Duration (min) 46-60   Interactions Interacted appropriately   Affect/Mood Appropriate   Goals Achieved Able to listen to others; Able to engage in interactions; Other (Comment)  (authentic spontaneous self expression, connection and insight)

## 2023-07-19 NOTE — PROGRESS NOTES
[facility] Group Note     07/19/23 1000 07/19/23 1230 07/19/23 1530   Activity/Group Checklist   Group Community meeting  (Goals/Positive Affirmations) Personal control  (Mindfulness Techniques - Awareness with Sequencing Cards and 5 Senses Grounding) Other (Comment)  (Open Discussion About Control, Perspectives, and Mindful Self-Awareness)   Attendance Attended Attended Attended   Attendance Duration (min) 31-45 46-60 31-45  (left group early)   Interactions Interacted appropriately Interacted appropriately Interacted appropriately  (completed activity but left group before sharing)   Affect/Mood Appropriate;Bright  (Focused and motivated) Appropriate;Bright  (Focused and motivated) Appropriate  (Visibly sad at times)   Goals Achieved Identified feelings; Discussed coping strategies; Able to listen to others; Able to engage in interactions; Able to reflect/comment on own behavior;Able to self-disclose; Able to recieve feedback; Able to give feedback to another  (developed appropriate goals) Identified feelings; Discussed coping strategies; Able to listen to others; Able to engage in interactions; Able to reflect/comment on own behavior;Able to recieve feedback; Able to give feedback to another Identified feelings; Identified triggers; Discussed coping strategies; Able to listen to others; Able to engage in interactions; Able to reflect/comment on own behavior;Able to self-disclose; Able to recieve feedback; Able to give feedback to another

## 2023-07-19 NOTE — NURSING NOTE
Pt remained visible in milieu, social and pleasant. No behavioral issues. Denies SI/HI/AVH at this time.

## 2023-07-19 NOTE — NURSING NOTE
Pleasant and cooperative, feeling improved since admission, stated she wants to write a book about her experiences in hospital. KARRI Valadez SI

## 2023-07-19 NOTE — CASE MANAGEMENT
Covering CM called OP provider [facility] located in [place], [place] (Phone: [phone]) per pt request and left VM requesting a call back if they accept pt insurance as pt is interested in their OP Therapy and Medication Management. CM informed them that pt will be discharged on 7/20/23.

## 2023-07-19 NOTE — NURSING NOTE
Bedside neuro checks WNL as charted heart saounds Wnl. Patient with complaints of dizziness, BP is increased as charted.  Patient reports HX vertigo

## 2023-07-19 NOTE — NURSING NOTE
Patient approached [facility] stating that she was feeling dizzy. /112, pulse 68. Patient stated she was having severe anxiety after having a stressful conversation with her boss. Atarax 100 mg PO given at 1631. Empathy provided and patient was able to open up about how stressful her job has been. She stated she thinks it may be time for her to find a new job. She stated her dizziness subsided, and wanted to go eat dinner with her roommate. BP at 1730 was 178/108. Reviewed with on-call MD via [facility] Text and Valium 5 mg PO ordered. Patient refused Valium and wanted to rest in bed, but was compliant with taking her scheduled dose of Losartan at [facility]. Manual blood pressure obtained at 1915 was 132/82 and patient appeared to be resting comfortably in bed. No complaints of anxiety at that time.

## 2023-07-19 NOTE — PROGRESS NOTES
Progress Note - [address]  47 y.o. female MRN: [id number]  Unit/Bed#: [facility] 211-02 Encounter: [id number]    Assessment/Plan   Principal Problem:    Recurrent major depressive disorder ([address])  Active Problems:    Anxiety    Moderate protein-calorie malnutrition (HCC)    Medical clearance for psychiatric admission    Hypothyroidism    Hypertension    Hyperlipidemia      Subjective: Patient was seen, chart was reviewed, and case was discussed with the team. Patient appears calm, cooperative and pleasant. Reports improvement in mood. She is more hopeful for future. She is able to to manage her anxiety better with the coping skills she learned on the unit. Reports writing helps and planning to write a book after discharge. She is compliant with medications. Denies any side effects.    Behavior over the last 24 hours:  unchanged  Sleep: normal  Appetite: normal  Medication side effects: No    Medical ROS: Pertinent items are noted in HPI.all other systems are negative    Current Medications:  Current Facility-Administered Medications   Medication Dose Route Frequency   • aluminum-magnesium hydroxide-simethicone (MAALOX) oral suspension 30 mL  30 mL Oral Q4H PRN   • atorvastatin (LIPITOR) tablet 40 mg  40 mg Oral Daily With Dinner   • benztropine (COGENTIN) injection 1 mg  1 mg Intramuscular Q4H PRN Max 6/day   • benztropine (COGENTIN) tablet 1 mg  1 mg Oral Q4H PRN Max 6/day   • bisacodyl (DULCOLAX) rectal suppository 10 mg  10 mg Rectal Daily PRN   • busPIRone (BUSPAR) tablet 5 mg  5 mg Oral BID   • hydrOXYzine HCL (ATARAX) tablet 50 mg  50 mg Oral Q6H PRN Max 4/day    Or   • diphenhydrAMINE (BENADRYL) injection 50 mg  50 mg Intramuscular Q6H PRN   • glycerin-hypromellose- (ARTIFICIAL TEARS) ophthalmic solution 1 drop  1 drop Both Eyes Q3H PRN   • hydrOXYzine HCL (ATARAX) tablet 100 mg  100 mg Oral Q6H PRN Max 4/day    Or   • LORazepam (ATIVAN) injection 2 mg  2 mg Intramuscular Q6H PRN   • hydrOXYzine HCL (ATARAX) tablet 25 mg  25 mg Oral Q6H PRN Max 4/day   • ibuprofen (MOTRIN) tablet 600 mg  600 mg Oral Q6H PRN   • ibuprofen (MOTRIN) tablet 800 mg  800 mg Oral Q8H PRN   • levothyroxine tablet 75 mcg  75 mcg Oral Early Morning   • losartan (COZAAR) tablet 50 mg  50 mg Oral Daily   • naproxen (NAPROSYN) tablet 500 mg  500 mg Oral BID PRN   • OLANZapine (ZyPREXA) tablet 5 mg  5 mg Oral Q4H PRN Max 3/day    Or   • OLANZapine (ZyPREXA) IM injection 2.5 mg  2.5 mg Intramuscular Q4H PRN Max 3/day   • OLANZapine (ZyPREXA) tablet 5 mg  5 mg Oral Q3H PRN Max 3/day    Or   • OLANZapine (ZyPREXA) IM injection 5 mg  5 mg Intramuscular Q3H PRN Max 3/day   • OLANZapine (ZyPREXA) tablet 2.5 mg  2.5 mg Oral Q4H PRN Max 6/day   • polyethylene glycol (MIRALAX) packet 17 g  17 g Oral Daily PRN   • propranolol (INDERAL) tablet 10 mg  10 mg Oral Q8H PRN   • senna-docusate sodium (SENOKOT S) 8.6-50 mg per tablet 1 tablet  1 tablet Oral Daily PRN   • sertraline (ZOLOFT) tablet 25 mg  25 mg Oral Daily   • traZODone (DESYREL) tablet 100 mg  100 mg Oral HS       Behavioral Health Medications:   all current active meds have been reviewed. Vitals:  Vitals:    07/19/23 0728   BP: 133/87   Pulse: 57   Resp: 18   Temp: 97.8 °F (36.6 °C)   SpO2:        Laboratory results:    I have personally reviewed all pertinent laboratory/tests results.     Mental Status Evaluation:    Appearance:  age appropriate   Behavior:  cooperative   Speech:  normal pitch and normal volume   Mood:  anxious   Affect:  mood-congruent   Thought Process:  goal directed and logical   Thought Content:  normal   Perceptual Disturbances: None   Risk Potential: Suicidal Ideations none  Homicidal Ideations none  Potential for Aggression No   Sensorium:  person, place and time/date   Memory:  recent and remote memory grossly intact   Consciousness:  alert and awake    Attention: attention span appeared shorter than expected for age   Insight:  limited   Judgment: limited   Gait/Station: normal gait/station   Motor Activity: no abnormal movements       Progress Toward Goals: Progressing    Recommended Treatment: Continue with pharmacotherapy, group therapy, milieu therapy and occupational therapy. 1.Discahrge planning  Risks, benefits and possible side effects of Medications:   Risks, benefits, alternatives, and possible side effects of patient's psychiatric medications were discussed with patient.

## 2023-07-19 NOTE — PLAN OF CARE
Problem: Ineffective Coping  Goal: Identifies healthy coping skills  Outcome: Progressing  Goal: Participates in unit activities  Description: Interventions:  - Provide therapeutic environment   - Provide required programming   - Redirect inappropriate behaviors   Outcome: Progressing     Problem: Depression  Goal: Refrain from isolation  Description: Interventions:  - Develop a trusting relationship   - Encourage socialization   Outcome: Progressing  Goal: Refrain from self-neglect  Outcome: Progressing  Goal: Complete daily ADLs, including personal hygiene independently, as able  Description: Interventions:  - Observe, teach, and assist patient with ADLS  -  Monitor and promote a balance of rest/activity, with adequate nutrition and elimination   Outcome: Progressing     Problem: Anxiety  Goal: Anxiety is at manageable level  Description: Interventions:  - Assess and monitor patient's anxiety level. - Monitor for signs and symptoms (heart palpitations, chest pain, shortness of breath, headaches, nausea, feeling jumpy, restlessness, irritable, apprehensive). - Collaborate with interdisciplinary team and initiate plan and interventions as ordered. - [place] patient to unit/surroundings  - Explain treatment plan  - Encourage participation in care  - Encourage verbalization of concerns/fears  - Identify coping mechanisms  - Assist in developing anxiety-reducing skills  - Administer/offer alternative therapies  - Limit or eliminate stimulants  Outcome: Progressing     Problem: Nutrition/Hydration-ADULT  Goal: Nutrient/Hydration intake appropriate for improving, restoring or maintaining nutritional needs  Description: Monitor and assess patient's nutrition/hydration status for malnutrition. Collaborate with interdisciplinary team and initiate plan and interventions as ordered. Monitor patient's weight and dietary intake as ordered or per policy. Utilize nutrition screening tool and intervene as necessary. Determine patient's food preferences and provide high-protein, high-caloric foods as appropriate.      INTERVENTIONS:  - Monitor oral intake, urinary output, labs, and treatment plans  - Assess nutrition and hydration status and recommend course of action  - Evaluate amount of meals eaten  - Assist patient with eating if necessary   - Allow adequate time for meals  - Recommend/ encourage appropriate diets, oral nutritional supplements, and vitamin/mineral supplements  - Order, calculate, and assess calorie counts as needed  - Recommend, monitor, and adjust tube feedings and TPN/PPN based on assessed needs  - Assess need for intravenous fluids  - Provide specific nutrition/hydration education as appropriate  - Include patient/family/caregiver in decisions related to nutrition  Outcome: Progressing

## 2023-07-19 NOTE — CASE MANAGEMENT
Covering CM met with pt to check in about dc plans for tomorrow. Pt was in her room writing in her journal.     Pt reported she just had a difficult talk with her boss from work and reported they keep asking her when she is coming back and pt voiced concern of her manager and co-workers talking about her personal business. Pt reported she plans to try and find a different job and one closer to her home. Pt also spoke about her two grandsons and how they are the center of her world. Pt requested return to work note and would like to return on Monday 7/24/23. CM will provide pt with note on day of dc. CM spoke to pt again about intake appt at [facility] for 7/25/23 @ 1:30pm and that she will need to go to first session in person then after that she could do ZOOM. Pt reported "I do not want to do ZOOM, I Like in person"     Pt also reported she does not want OP in [address] [place] [address] which is about 15 mins from where she lives. This limits the amount of available providers that pt can be scheduled with based on her choice in location and who accepts her insurance. CM informed pt that CM left VM for the OP provider she requested - [facility] in [place], [place] and that CM is waiting for a call back. CM will call pt after dc when/if CM receives returned call. CM also encouraged pt to call after dc to check in as well. Pt reported she would like to be scheduled there if they accept her insurance. Pt reported it is closer than the above [facility]. Pt verbalized understanding. Pt reported she will need transportation home. CM to arrange.

## 2023-07-19 NOTE — CASE MANAGEMENT
Covering CM sent transportation request to [facility] for pt dc to her home on Thursday 7/20/23.     [address] [place] [address]     Pt scheduled for LYFT at 10am

## 2023-07-20 VITALS
HEART RATE: 67 BPM | SYSTOLIC BLOOD PRESSURE: 124 MMHG | TEMPERATURE: 97.8 F | OXYGEN SATURATION: 98 % | HEIGHT: 64 IN | BODY MASS INDEX: 29.84 KG/M2 | WEIGHT: 174.8 LBS | DIASTOLIC BLOOD PRESSURE: 87 MMHG | RESPIRATION RATE: 16 BRPM

## 2023-07-20 PROCEDURE — 99239 HOSP IP/OBS DSCHRG MGMT >30: CPT | Performed by: STUDENT IN AN ORGANIZED HEALTH CARE EDUCATION/TRAINING PROGRAM

## 2023-07-20 RX ORDER — SERTRALINE HYDROCHLORIDE 25 MG/1
25 TABLET, FILM COATED ORAL DAILY
Qty: 30 TABLET | Refills: 0 | Status: SHIPPED | OUTPATIENT
Start: 2023-07-20

## 2023-07-20 RX ORDER — BUSPIRONE HYDROCHLORIDE 5 MG/1
5 TABLET ORAL 2 TIMES DAILY
Qty: 30 TABLET | Refills: 0 | Status: SHIPPED | OUTPATIENT
Start: 2023-07-20

## 2023-07-20 RX ORDER — ATORVASTATIN CALCIUM 40 MG/1
40 TABLET, FILM COATED ORAL
Qty: 30 TABLET | Refills: 0 | Status: SHIPPED | OUTPATIENT
Start: 2023-07-20

## 2023-07-20 RX ORDER — TRAZODONE HYDROCHLORIDE 100 MG/1
100 TABLET ORAL
Qty: 30 TABLET | Refills: 0 | Status: SHIPPED | OUTPATIENT
Start: 2023-07-20

## 2023-07-20 RX ORDER — LEVOTHYROXINE SODIUM 0.07 MG/1
75 TABLET ORAL
Qty: 30 TABLET | Refills: 0 | Status: SHIPPED | OUTPATIENT
Start: 2023-07-20

## 2023-07-20 RX ORDER — LOSARTAN POTASSIUM 25 MG/1
25 TABLET ORAL 2 TIMES DAILY
Qty: 60 TABLET | Refills: 0 | Status: SHIPPED | OUTPATIENT
Start: 2023-07-20

## 2023-07-20 RX ADMIN — LEVOTHYROXINE SODIUM 75 MCG: 75 TABLET ORAL at 06:50

## 2023-07-20 RX ADMIN — LOSARTAN POTASSIUM 25 MG: 25 TABLET, FILM COATED ORAL at 09:09

## 2023-07-20 RX ADMIN — BUSPIRONE HYDROCHLORIDE 5 MG: 5 TABLET ORAL at 09:09

## 2023-07-20 RX ADMIN — SERTRALINE HYDROCHLORIDE 25 MG: 25 TABLET ORAL at 09:09

## 2023-07-20 NOTE — TREATMENT TEAM
07/20/23 0930   Activity/Group Checklist   Group Exercise   Attendance Attended   Attendance Duration (min) 16-30   Interactions Interacted appropriately   Affect/Mood Appropriate   Goals Achieved Able to listen to others; Able to engage in interactions; Other (Comment); Identified feelings  (walked laps on the unit with peers and this therapist)

## 2023-07-20 NOTE — PROGRESS NOTES
07/20/23 0750   Team Meeting   Meeting Type Daily Rounds   Team Members Present   Team Members Present Physician;Nurse;; Other (Discipline and Name)   Physician Team Member Dr. Barron Carpenter / Dr. Camilo Lux / Toribio Coker / Dimple Borja Team Member Amee Aguilar / [facility] Management Team Member Rodger Bowser / Von Oliver   Other (Discipline and Name) Rima Singh (art therapist)   Patient/Family Present   Patient Present No   Patient's Family Present No     Status: Pt is reported to be ready for discharge. Pt is reported to be denying any SI, HI, and AVH. Pt is reported to have slept through the night. Medication: No medication changes. Pt received PRN Atarax 100 mg for anxiety. D/C: Pt is being discharge today 7/20/2023 with [facility] through [facility] in [address] [facility].  Pt has an intake appointment for 7/25/2023 @1:30pm.

## 2023-07-20 NOTE — CASE MANAGEMENT
CM met with Pt to discuss discharge planning. Pt stated that they are ready for discharge. Pt stated that they were upset with their boss from their job at [employer] because they were giving her a hard time about being in the hospital. Pt stated that they are just a body to them. Pt stated that they are going to be prioritizing their mental health over the job and don't really care if they are fired. CM stated that they had a return to work letter completed for her to bring to them when she goes back. CM stated that she will be picked up today at 10am via [employer]. Pt signed Free Local Transportation Waiver.

## 2023-07-20 NOTE — PLAN OF CARE
Problem: DISCHARGE PLANNING  Goal: Discharge to home or other facility with appropriate resources  Description: INTERVENTIONS:  - Identify barriers to discharge w/patient and caregiver  - Arrange for needed discharge resources and transportation as appropriate  - Identify discharge learning needs (meds, wound care, etc.)  - Arrange for interpretive services to assist at discharge as needed  - Refer to Case Management Department for coordinating discharge planning if the patient needs post-hospital services based on physician/advanced practitioner order or complex needs related to functional status, cognitive ability, or social support system  Outcome: Adequate for Discharge  Note: Pt is being discharged today 7/20/2023 with [facility] through [facility].

## 2023-07-20 NOTE — PLAN OF CARE
Problem: DISCHARGE PLANNING  Goal: Discharge to home or other facility with appropriate resources  Description: INTERVENTIONS:  - Identify barriers to discharge w/patient and caregiver  - Arrange for needed discharge resources and transportation as appropriate  - Identify discharge learning needs (meds, wound care, etc.)  - Arrange for interpretive services to assist at discharge as needed  - Refer to Case Management Department for coordinating discharge planning if the patient needs post-hospital services based on physician/advanced practitioner order or complex needs related to functional status, cognitive ability, or social support system  Outcome: Adequate for Discharge     Problem: Ineffective Coping  Goal: Identifies healthy coping skills  Outcome: Adequate for Discharge  Goal: Participates in unit activities  Description: Interventions:  - Provide therapeutic environment   - Provide required programming   - Redirect inappropriate behaviors   Outcome: Adequate for Discharge     Problem: Depression  Goal: Refrain from isolation  Description: Interventions:  - Develop a trusting relationship   - Encourage socialization   Outcome: Adequate for Discharge  Goal: Refrain from self-neglect  Outcome: Adequate for Discharge  Goal: Complete daily ADLs, including personal hygiene independently, as able  Description: Interventions:  - Observe, teach, and assist patient with ADLS  -  Monitor and promote a balance of rest/activity, with adequate nutrition and elimination   Outcome: Adequate for Discharge     Problem: Anxiety  Goal: Anxiety is at manageable level  Description: Interventions:  - Assess and monitor patient's anxiety level. - Monitor for signs and symptoms (heart palpitations, chest pain, shortness of breath, headaches, nausea, feeling jumpy, restlessness, irritable, apprehensive). - Collaborate with interdisciplinary team and initiate plan and interventions as ordered.   - [place] patient to unit/surroundings  - Explain treatment plan  - Encourage participation in care  - Encourage verbalization of concerns/fears  - Identify coping mechanisms  - Assist in developing anxiety-reducing skills  - Administer/offer alternative therapies  - Limit or eliminate stimulants  Outcome: Adequate for Discharge     Problem: Nutrition/Hydration-ADULT  Goal: Nutrient/Hydration intake appropriate for improving, restoring or maintaining nutritional needs  Description: Monitor and assess patient's nutrition/hydration status for malnutrition. Collaborate with interdisciplinary team and initiate plan and interventions as ordered. Monitor patient's weight and dietary intake as ordered or per policy. Utilize nutrition screening tool and intervene as necessary. Determine patient's food preferences and provide high-protein, high-caloric foods as appropriate.      INTERVENTIONS:  - Monitor oral intake, urinary output, labs, and treatment plans  - Assess nutrition and hydration status and recommend course of action  - Evaluate amount of meals eaten  - Assist patient with eating if necessary   - Allow adequate time for meals  - Recommend/ encourage appropriate diets, oral nutritional supplements, and vitamin/mineral supplements  - Order, calculate, and assess calorie counts as needed  - Recommend, monitor, and adjust tube feedings and TPN/PPN based on assessed needs  - Assess need for intravenous fluids  - Provide specific nutrition/hydration education as appropriate  - Include patient/family/caregiver in decisions related to nutrition  Outcome: Adequate for Discharge

## 2023-07-20 NOTE — DISCHARGE SUMMARY
Discharge Summary - [address] 47 y.o. female MRN: [id number]  Unit/Bed#: U 211-02 Encounter: [id number]     Admission Date:   Admission Orders (From admission, onward)     Ordered        07/09/23 2027  ED TO DIFFERENT CAMPUS [address] UNIT or INPATIENT MEDICAL UNIT to [facility] (using Discharge Readmit Navigator) - Admit Patient to [facility] Unit  Once                            Discharge Date: No discharge date for patient encounter. Attending Psychiatrist: Mychal Navarro*    Reason for Admission/HPI:   History of Present Illness     As per H&P on 7/10:"Per ED provider on 7/8:"Thinking about suicide for 1 week, wrote multiple suicide notes. Pt reports " I came close the other night to committing suicide but didn't do it" Pt reports " I'm losing my health insurance, my work is stressful, I'm behind on  bills"."     This is 48 yo female with hx of depression/anxiety admitted to inpatient unit on voluntary status for worsening of mood and suicidal ideation with plan in the context of psychosocial stressors. Patient endorses multiple stressors. She has financial stressors, going to loose health insurance and death anniversary of her  coming up in this month. She endorses depressed mood, anhedonia, lack of motivation, poor concentration, fatigue and isolating self. She also been having suicidal thoughts, wrote suicide notes to family members and planned to overdose on pills. She did overdose in the past, 2 years ago.  She didn't wanted to do again, so aborted and brought herself to hospital.   Psychiatric Review Of Systems:  Medication side effects: none  Sleep: Poor  Appetite: Poor lost 10 pounds  Hygiene: able to tend to instrumental and basic ADLs  Anxiety and panic attacks: Yes  Psychotic Symptoms: denies  Depression Symptoms: Yes   Manic Symptoms: denies  PTSD Symptoms: denies  Suicidal Thoughts: denies  Homicidal Thoughts: denies"    Hospital Course:   Patient was admitted to the inpatient psychiatric unit and started on Behavioral Health checks every 7 minutes. During the hospitalization patient was encouraged to attend individual therapy, group therapy, milieu therapy and occupational therapy. Psychiatric medications were restarted during the hospital stay. To address mood symptoms, patient was treated with antidepressant Zoloft. Buspar was started for anxiety and Trazodone for insomnia and anxiety. Prior to beginning of treatment medications risks and benefits and possible side effects were reviewed. Patient verbalized understanding and agreement for treatment. Upon admission patient was seen by medical service for medical clearance for inpatient treatment and medical follow up. Patient’s symptoms resolved over the hospital course With adjustment of medications to Zoloft 25 mg daily, Buspar 5 mg BID, Trazodone 100 mg PO HS and therapeutic milieu, her symptoms were resolved. At the end of treatment patient was improved and mood was more stable at the time of discharge. Patient denied suicidal ideation, intent or plan at the time of discharge and denied homicidal ideation, intent or plan at the time of discharge. There was no overt psychosis at the time of discharge. Patient was participating appropriately in milieu at the time of discharge. Behavior was appropriate on the unit at the time of discharge. Sleep and appetite were improved. Patient was tolerating medications and was not reporting any significant side effects at the time of discharge. Since patient was doing well at the end of the hospitalization, treatment team felt that she could be safely discharged to outpatient care. Patient also felt stable and ready for discharge at the end of the hospital stay.      Mental Status at time of Discharge:     Appearance:  age appropriate   Behavior:  cooperative   Speech:  normal pitch and normal volume   Mood:  "better"   Affect:  mood-congruent   Thought Process:  goal directed and logical   Associations: intact associations   Thought Content:  normal   Perceptual Disturbances: None   Risk Potential: Suicidal Ideations none, Homicidal Ideations none and Potential for Aggression No   Sensorium:  person, place and time/date   Cognition:  recent and remote memory grossly intact   Consciousness:  alert and awake    Attention: attention span appeared shorter than expected for age   Insight:  fair   Judgment: fair   Gait/Station: normal gait/station   Motor Activity: no abnormal movements       Discharge Diagnosis:   Major depressive disorder  Generalized anxiety disorder    Medical Problems     Resolved Problems  Date Reviewed: 7/20/2023   None             Discharge Medications:  See after visit summary for reconciled discharge medications provided to patient and family. Discharge instructions/Information to patient and family:   See after visit summary for information provided to patient and family. Provisions for Follow-Up Care:  See after visit summary for information related to follow-up care and any pertinent home health orders. Discharge Statement   I spent 33 minutes discharging the patient. This time was spent on the day of discharge. I had direct contact with the patient on the day of discharge. Additional documentation is required if more than 30 minutes were spent on discharge.

## 2023-07-20 NOTE — NURSING NOTE
Pt reports feeling ready for discharge. Denies SI/HI or hallucinations. Compliant with medication. Reports fluctuating anxiety related to discharge and going back to job. Pt walks the halls with peers. Denies any question or concern at this time.

## 2023-07-20 NOTE — NURSING NOTE
Pt expressed readiness for discharge. AVS reviewed with pt. Belongings packed by SS. Medications e-prescribed. Denies any question or concerns. Pt discharge from unit via lyft.

## 2023-07-20 NOTE — BH TRANSITION RECORD
Contact Information: If you have any questions, concerns, pended studies, tests and/or procedures, or emergencies regarding your inpatient behavioral health visit. Please contact [place] [facility] [phone] and ask to speak to a , nurse or physician. A contact is available 24 hours/ 7 days a week at this number. Summary of Procedures Performed During your Stay:  Below is a list of major procedures performed during your hospital stay and a summary of results:  - No major procedures performed. Pending Studies (From admission, onward)    None        Please follow up on the above pending studies with your PCP and/or referring provider.

## 2023-09-06 ENCOUNTER — HOSPITAL ENCOUNTER (EMERGENCY)
Facility: HOSPITAL | Age: 53
End: 2023-09-06
Attending: EMERGENCY MEDICINE | Admitting: EMERGENCY MEDICINE
Payer: COMMERCIAL

## 2023-09-06 ENCOUNTER — HOSPITAL ENCOUNTER (INPATIENT)
Facility: HOSPITAL | Age: 53
LOS: 8 days | Discharge: HOME | DRG: 751 | End: 2023-09-14
Attending: STUDENT IN AN ORGANIZED HEALTH CARE EDUCATION/TRAINING PROGRAM | Admitting: PSYCHIATRY & NEUROLOGY
Payer: COMMERCIAL

## 2023-09-06 VITALS
TEMPERATURE: 98.1 F | OXYGEN SATURATION: 99 % | RESPIRATION RATE: 16 BRPM | DIASTOLIC BLOOD PRESSURE: 93 MMHG | HEART RATE: 77 BPM | SYSTOLIC BLOOD PRESSURE: 147 MMHG

## 2023-09-06 DIAGNOSIS — F32.A DEPRESSION, UNSPECIFIED DEPRESSION TYPE: ICD-10-CM

## 2023-09-06 DIAGNOSIS — I10 HYPERTENSION, UNSPECIFIED TYPE: Primary | ICD-10-CM

## 2023-09-06 DIAGNOSIS — F32.A DEPRESSION, UNSPECIFIED DEPRESSION TYPE: Primary | ICD-10-CM

## 2023-09-06 DIAGNOSIS — E03.9 HYPOTHYROIDISM, UNSPECIFIED TYPE: ICD-10-CM

## 2023-09-06 DIAGNOSIS — R45.851 SUICIDAL IDEATIONS: ICD-10-CM

## 2023-09-06 LAB
ALBUMIN SERPL BCP-MCNC: 4.6 G/DL (ref 3.5–5)
ALP SERPL-CCNC: 89 U/L (ref 34–104)
ALT SERPL W P-5'-P-CCNC: 13 U/L (ref 7–52)
AMPHETAMINES SERPL QL SCN: NEGATIVE
ANION GAP SERPL CALCULATED.3IONS-SCNC: 7 MMOL/L
AST SERPL W P-5'-P-CCNC: 16 U/L (ref 13–39)
BACTERIA UR QL AUTO: ABNORMAL /HPF
BARBITURATES UR QL: NEGATIVE
BASOPHILS # BLD AUTO: 0.06 THOUSANDS/ÂΜL (ref 0–0.1)
BASOPHILS NFR BLD AUTO: 1 % (ref 0–1)
BENZODIAZ UR QL: NEGATIVE
BILIRUB SERPL-MCNC: 0.49 MG/DL (ref 0.2–1)
BILIRUB UR QL STRIP: NEGATIVE
BUN SERPL-MCNC: 20 MG/DL (ref 5–25)
CALCIUM SERPL-MCNC: 9.3 MG/DL (ref 8.4–10.2)
CHLORIDE SERPL-SCNC: 104 MMOL/L (ref 96–108)
CLARITY UR: CLEAR
CO2 SERPL-SCNC: 28 MMOL/L (ref 21–32)
COCAINE UR QL: NEGATIVE
COLOR UR: YELLOW
CREAT SERPL-MCNC: 0.77 MG/DL (ref 0.6–1.3)
EOSINOPHIL # BLD AUTO: 0.4 THOUSAND/ÂΜL (ref 0–0.61)
EOSINOPHIL NFR BLD AUTO: 6 % (ref 0–6)
ERYTHROCYTE [DISTWIDTH] IN BLOOD BY AUTOMATED COUNT: 12.4 % (ref 11.6–15.1)
ETHANOL SERPL-MCNC: <10 MG/DL
EXT PREGNANCY TEST URINE: NEGATIVE
EXT. CONTROL: NORMAL
FLUAV RNA RESP QL NAA+PROBE: NEGATIVE
FLUBV RNA RESP QL NAA+PROBE: NEGATIVE
GFR SERPL CREATININE-BSD FRML MDRD: 89 ML/MIN/1.73SQ M
GLUCOSE SERPL-MCNC: 100 MG/DL (ref 65–140)
GLUCOSE UR STRIP-MCNC: NEGATIVE MG/DL
HCT VFR BLD AUTO: 42.2 % (ref 34.8–46.1)
HGB BLD-MCNC: 14 G/DL (ref 11.5–15.4)
HGB UR QL STRIP.AUTO: NEGATIVE
IMM GRANULOCYTES # BLD AUTO: 0.03 THOUSAND/UL (ref 0–0.2)
IMM GRANULOCYTES NFR BLD AUTO: 0 % (ref 0–2)
KETONES UR STRIP-MCNC: NEGATIVE MG/DL
LEUKOCYTE ESTERASE UR QL STRIP: ABNORMAL
LYMPHOCYTES # BLD AUTO: 2.1 THOUSANDS/ÂΜL (ref 0.6–4.47)
LYMPHOCYTES NFR BLD AUTO: 29 % (ref 14–44)
MCH RBC QN AUTO: 29.9 PG (ref 26.8–34.3)
MCHC RBC AUTO-ENTMCNC: 33.2 G/DL (ref 31.4–37.4)
MCV RBC AUTO: 90 FL (ref 82–98)
METHADONE UR QL: NEGATIVE
MONOCYTES # BLD AUTO: 0.47 THOUSAND/ÂΜL (ref 0.17–1.22)
MONOCYTES NFR BLD AUTO: 7 % (ref 4–12)
NEUTROPHILS # BLD AUTO: 4.15 THOUSANDS/ÂΜL (ref 1.85–7.62)
NEUTS SEG NFR BLD AUTO: 57 % (ref 43–75)
NITRITE UR QL STRIP: NEGATIVE
NON-SQ EPI CELLS URNS QL MICRO: ABNORMAL /HPF
NRBC BLD AUTO-RTO: 0 /100 WBCS
OPIATES UR QL SCN: NEGATIVE
OXYCODONE+OXYMORPHONE UR QL SCN: NEGATIVE
PCP UR QL: NEGATIVE
PH UR STRIP.AUTO: 5.5 [PH]
PLATELET # BLD AUTO: 281 THOUSANDS/UL (ref 149–390)
PMV BLD AUTO: 9 FL (ref 8.9–12.7)
POTASSIUM SERPL-SCNC: 3.7 MMOL/L (ref 3.5–5.3)
PROT SERPL-MCNC: 7.8 G/DL (ref 6.4–8.4)
PROT UR STRIP-MCNC: NEGATIVE MG/DL
RBC # BLD AUTO: 4.69 MILLION/UL (ref 3.81–5.12)
RBC #/AREA URNS AUTO: ABNORMAL /HPF
RSV RNA RESP QL NAA+PROBE: NEGATIVE
SARS-COV-2 RNA RESP QL NAA+PROBE: NEGATIVE
SODIUM SERPL-SCNC: 139 MMOL/L (ref 135–147)
SP GR UR STRIP.AUTO: 1.01 (ref 1–1.03)
T4 FREE SERPL-MCNC: 0.83 NG/DL (ref 0.61–1.12)
THC UR QL: NEGATIVE
TSH SERPL DL<=0.05 MIU/L-ACNC: 6.58 UIU/ML (ref 0.45–4.5)
UROBILINOGEN UR QL STRIP.AUTO: 1 E.U./DL
WBC # BLD AUTO: 7.21 THOUSAND/UL (ref 4.31–10.16)
WBC #/AREA URNS AUTO: ABNORMAL /HPF

## 2023-09-06 PROCEDURE — 80307 DRUG TEST PRSMV CHEM ANLYZR: CPT | Performed by: EMERGENCY MEDICINE

## 2023-09-06 PROCEDURE — 99283 EMERGENCY DEPT VISIT LOW MDM: CPT

## 2023-09-06 PROCEDURE — 80053 COMPREHEN METABOLIC PANEL: CPT | Performed by: EMERGENCY MEDICINE

## 2023-09-06 PROCEDURE — 84439 ASSAY OF FREE THYROXINE: CPT | Performed by: EMERGENCY MEDICINE

## 2023-09-06 PROCEDURE — 36415 COLL VENOUS BLD VENIPUNCTURE: CPT | Performed by: EMERGENCY MEDICINE

## 2023-09-06 PROCEDURE — 99285 EMERGENCY DEPT VISIT HI MDM: CPT | Performed by: EMERGENCY MEDICINE

## 2023-09-06 PROCEDURE — 82077 ASSAY SPEC XCP UR&BREATH IA: CPT | Performed by: EMERGENCY MEDICINE

## 2023-09-06 PROCEDURE — 84443 ASSAY THYROID STIM HORMONE: CPT | Performed by: EMERGENCY MEDICINE

## 2023-09-06 PROCEDURE — 81001 URINALYSIS AUTO W/SCOPE: CPT | Performed by: EMERGENCY MEDICINE

## 2023-09-06 PROCEDURE — 81025 URINE PREGNANCY TEST: CPT | Performed by: EMERGENCY MEDICINE

## 2023-09-06 PROCEDURE — 0241U HB NFCT DS VIR RESP RNA 4 TRGT: CPT | Performed by: EMERGENCY MEDICINE

## 2023-09-06 PROCEDURE — 85025 COMPLETE CBC W/AUTO DIFF WBC: CPT | Performed by: EMERGENCY MEDICINE

## 2023-09-06 RX ORDER — HALOPERIDOL 1 MG/1
1 TABLET ORAL EVERY 6 HOURS PRN
Status: CANCELLED | OUTPATIENT
Start: 2023-09-06

## 2023-09-06 RX ORDER — POLYETHYLENE GLYCOL 3350 17 G/17G
17 POWDER, FOR SOLUTION ORAL DAILY PRN
Status: CANCELLED | OUTPATIENT
Start: 2023-09-06

## 2023-09-06 RX ORDER — HYDROXYZINE HYDROCHLORIDE 25 MG/1
25 TABLET, FILM COATED ORAL
Status: CANCELLED | OUTPATIENT
Start: 2023-09-06

## 2023-09-06 RX ORDER — TRAZODONE HYDROCHLORIDE 50 MG/1
50 TABLET ORAL
Status: DISCONTINUED | OUTPATIENT
Start: 2023-09-06 | End: 2023-09-14 | Stop reason: HOSPADM

## 2023-09-06 RX ORDER — HALOPERIDOL 5 MG/ML
2.5 INJECTION INTRAMUSCULAR
Status: DISCONTINUED | OUTPATIENT
Start: 2023-09-06 | End: 2023-09-14 | Stop reason: HOSPADM

## 2023-09-06 RX ORDER — BENZTROPINE MESYLATE 1 MG/ML
1 INJECTION INTRAMUSCULAR; INTRAVENOUS
Status: CANCELLED | OUTPATIENT
Start: 2023-09-06

## 2023-09-06 RX ORDER — SERTRALINE HYDROCHLORIDE 20 MG/ML
25 SOLUTION ORAL DAILY
Status: DISCONTINUED | OUTPATIENT
Start: 2023-09-06 | End: 2023-09-06

## 2023-09-06 RX ORDER — HALOPERIDOL 5 MG/ML
5 INJECTION INTRAMUSCULAR
Status: CANCELLED | OUTPATIENT
Start: 2023-09-06

## 2023-09-06 RX ORDER — NABUMETONE 500 MG/1
500 TABLET, FILM COATED ORAL 2 TIMES DAILY
COMMUNITY
Start: 2023-05-30 | End: 2023-09-14

## 2023-09-06 RX ORDER — HYDROXYZINE 50 MG/1
50 TABLET, FILM COATED ORAL
Status: DISCONTINUED | OUTPATIENT
Start: 2023-09-06 | End: 2023-09-14 | Stop reason: HOSPADM

## 2023-09-06 RX ORDER — HALOPERIDOL 5 MG/1
2.5 TABLET ORAL
Status: CANCELLED | OUTPATIENT
Start: 2023-09-06

## 2023-09-06 RX ORDER — LEVOTHYROXINE SODIUM 0.07 MG/1
75 TABLET ORAL
Status: DISCONTINUED | OUTPATIENT
Start: 2023-09-07 | End: 2023-09-14 | Stop reason: HOSPADM

## 2023-09-06 RX ORDER — ACETAMINOPHEN 325 MG/1
650 TABLET ORAL EVERY 6 HOURS PRN
Status: CANCELLED | OUTPATIENT
Start: 2023-09-06

## 2023-09-06 RX ORDER — LORAZEPAM 2 MG/ML
1 INJECTION INTRAMUSCULAR
Status: CANCELLED | OUTPATIENT
Start: 2023-09-06

## 2023-09-06 RX ORDER — HALOPERIDOL 5 MG/1
5 TABLET ORAL
Status: CANCELLED | OUTPATIENT
Start: 2023-09-06

## 2023-09-06 RX ORDER — MAGNESIUM HYDROXIDE/ALUMINUM HYDROXICE/SIMETHICONE 120; 1200; 1200 MG/30ML; MG/30ML; MG/30ML
30 SUSPENSION ORAL EVERY 4 HOURS PRN
Status: DISCONTINUED | OUTPATIENT
Start: 2023-09-06 | End: 2023-09-14 | Stop reason: HOSPADM

## 2023-09-06 RX ORDER — HALOPERIDOL 5 MG/1
2.5 TABLET ORAL
Status: DISCONTINUED | OUTPATIENT
Start: 2023-09-06 | End: 2023-09-14 | Stop reason: HOSPADM

## 2023-09-06 RX ORDER — ROSUVASTATIN CALCIUM 10 MG/1
40 TABLET, COATED ORAL DAILY
COMMUNITY
Start: 2023-02-23 | End: 2023-09-14

## 2023-09-06 RX ORDER — HALOPERIDOL 5 MG/ML
2.5 INJECTION INTRAMUSCULAR
Status: CANCELLED | OUTPATIENT
Start: 2023-09-06

## 2023-09-06 RX ORDER — HALOPERIDOL 5 MG/ML
5 INJECTION INTRAMUSCULAR
Status: DISCONTINUED | OUTPATIENT
Start: 2023-09-06 | End: 2023-09-14 | Stop reason: HOSPADM

## 2023-09-06 RX ORDER — BUSPIRONE HYDROCHLORIDE 5 MG/1
5 TABLET ORAL 2 TIMES DAILY
Status: DISCONTINUED | OUTPATIENT
Start: 2023-09-06 | End: 2023-09-06 | Stop reason: HOSPADM

## 2023-09-06 RX ORDER — IBUPROFEN 800 MG/1
800 TABLET ORAL EVERY 6 HOURS PRN
Status: DISCONTINUED | OUTPATIENT
Start: 2023-09-06 | End: 2023-09-07

## 2023-09-06 RX ORDER — ACETAMINOPHEN 325 MG/1
975 TABLET ORAL EVERY 6 HOURS PRN
Status: CANCELLED | OUTPATIENT
Start: 2023-09-06

## 2023-09-06 RX ORDER — BISACODYL 10 MG
10 SUPPOSITORY, RECTAL RECTAL DAILY PRN
Status: CANCELLED | OUTPATIENT
Start: 2023-09-06

## 2023-09-06 RX ORDER — BENZTROPINE MESYLATE 1 MG/ML
1 INJECTION INTRAMUSCULAR; INTRAVENOUS
Status: DISCONTINUED | OUTPATIENT
Start: 2023-09-06 | End: 2023-09-14 | Stop reason: HOSPADM

## 2023-09-06 RX ORDER — IBUPROFEN 600 MG/1
600 TABLET ORAL EVERY 6 HOURS PRN
Status: DISCONTINUED | OUTPATIENT
Start: 2023-09-06 | End: 2023-09-07

## 2023-09-06 RX ORDER — HALOPERIDOL 1 MG/1
1 TABLET ORAL EVERY 6 HOURS PRN
Status: DISCONTINUED | OUTPATIENT
Start: 2023-09-06 | End: 2023-09-14 | Stop reason: HOSPADM

## 2023-09-06 RX ORDER — LOSARTAN POTASSIUM 25 MG/1
25 TABLET ORAL 2 TIMES DAILY
Status: DISCONTINUED | OUTPATIENT
Start: 2023-09-06 | End: 2023-09-06 | Stop reason: HOSPADM

## 2023-09-06 RX ORDER — DIPHENHYDRAMINE HYDROCHLORIDE 50 MG/ML
50 INJECTION INTRAMUSCULAR; INTRAVENOUS EVERY 6 HOURS PRN
Status: CANCELLED | OUTPATIENT
Start: 2023-09-06

## 2023-09-06 RX ORDER — HYDROXYZINE HYDROCHLORIDE 25 MG/1
25 TABLET, FILM COATED ORAL
Status: DISCONTINUED | OUTPATIENT
Start: 2023-09-06 | End: 2023-09-14 | Stop reason: HOSPADM

## 2023-09-06 RX ORDER — LORAZEPAM 2 MG/ML
2 INJECTION INTRAMUSCULAR
Status: CANCELLED | OUTPATIENT
Start: 2023-09-06

## 2023-09-06 RX ORDER — BUSPIRONE HYDROCHLORIDE 5 MG/1
5 TABLET ORAL 3 TIMES DAILY
Status: DISCONTINUED | OUTPATIENT
Start: 2023-09-06 | End: 2023-09-07

## 2023-09-06 RX ORDER — LANOLIN ALCOHOL/MO/W.PET/CERES
3 CREAM (GRAM) TOPICAL
Status: DISCONTINUED | OUTPATIENT
Start: 2023-09-06 | End: 2023-09-07

## 2023-09-06 RX ORDER — AMOXICILLIN 250 MG
1 CAPSULE ORAL DAILY PRN
Status: DISCONTINUED | OUTPATIENT
Start: 2023-09-06 | End: 2023-09-14 | Stop reason: HOSPADM

## 2023-09-06 RX ORDER — LORAZEPAM 2 MG/ML
2 INJECTION INTRAMUSCULAR EVERY 6 HOURS PRN
Status: DISCONTINUED | OUTPATIENT
Start: 2023-09-06 | End: 2023-09-14 | Stop reason: HOSPADM

## 2023-09-06 RX ORDER — HALOPERIDOL 5 MG/1
5 TABLET ORAL
Status: DISCONTINUED | OUTPATIENT
Start: 2023-09-06 | End: 2023-09-14 | Stop reason: HOSPADM

## 2023-09-06 RX ORDER — LEVOTHYROXINE SODIUM 0.07 MG/1
75 TABLET ORAL
Status: CANCELLED | OUTPATIENT
Start: 2023-09-07

## 2023-09-06 RX ORDER — BENZTROPINE MESYLATE 1 MG/1
1 TABLET ORAL
Status: CANCELLED | OUTPATIENT
Start: 2023-09-06

## 2023-09-06 RX ORDER — LANOLIN ALCOHOL/MO/W.PET/CERES
3 CREAM (GRAM) TOPICAL
Status: CANCELLED | OUTPATIENT
Start: 2023-09-06

## 2023-09-06 RX ORDER — BUSPIRONE HYDROCHLORIDE 5 MG/1
5 TABLET ORAL 2 TIMES DAILY
Status: DISCONTINUED | OUTPATIENT
Start: 2023-09-07 | End: 2023-09-06

## 2023-09-06 RX ORDER — BUSPIRONE HYDROCHLORIDE 5 MG/1
5 TABLET ORAL 3 TIMES DAILY
Status: CANCELLED | OUTPATIENT
Start: 2023-09-06

## 2023-09-06 RX ORDER — TRAZODONE HYDROCHLORIDE 50 MG/1
50 TABLET ORAL
Status: CANCELLED | OUTPATIENT
Start: 2023-09-06

## 2023-09-06 RX ORDER — HYDROXYZINE 50 MG/1
100 TABLET, FILM COATED ORAL
Status: DISCONTINUED | OUTPATIENT
Start: 2023-09-06 | End: 2023-09-14 | Stop reason: HOSPADM

## 2023-09-06 RX ORDER — BENZTROPINE MESYLATE 1 MG/ML
0.5 INJECTION INTRAMUSCULAR; INTRAVENOUS
Status: DISCONTINUED | OUTPATIENT
Start: 2023-09-06 | End: 2023-09-14 | Stop reason: HOSPADM

## 2023-09-06 RX ORDER — HYDROXYZINE HYDROCHLORIDE 25 MG/1
50 TABLET, FILM COATED ORAL
Status: CANCELLED | OUTPATIENT
Start: 2023-09-06

## 2023-09-06 RX ORDER — LORAZEPAM 2 MG/ML
2 INJECTION INTRAMUSCULAR EVERY 6 HOURS PRN
Status: CANCELLED | OUTPATIENT
Start: 2023-09-06

## 2023-09-06 RX ORDER — LEVOTHYROXINE SODIUM 0.07 MG/1
75 TABLET ORAL
Status: DISCONTINUED | OUTPATIENT
Start: 2023-09-07 | End: 2023-09-06 | Stop reason: HOSPADM

## 2023-09-06 RX ORDER — BENZTROPINE MESYLATE 1 MG/ML
0.5 INJECTION INTRAMUSCULAR; INTRAVENOUS
Status: CANCELLED | OUTPATIENT
Start: 2023-09-06

## 2023-09-06 RX ORDER — SERTRALINE HYDROCHLORIDE 25 MG/1
25 TABLET, FILM COATED ORAL DAILY
Status: DISCONTINUED | OUTPATIENT
Start: 2023-09-06 | End: 2023-09-06 | Stop reason: HOSPADM

## 2023-09-06 RX ORDER — IBUPROFEN 400 MG/1
400 TABLET ORAL EVERY 6 HOURS PRN
Status: DISCONTINUED | OUTPATIENT
Start: 2023-09-06 | End: 2023-09-07

## 2023-09-06 RX ORDER — ACETAMINOPHEN 325 MG/1
650 TABLET ORAL EVERY 4 HOURS PRN
Status: CANCELLED | OUTPATIENT
Start: 2023-09-06

## 2023-09-06 RX ORDER — LORAZEPAM 2 MG/ML
2 INJECTION INTRAMUSCULAR
Status: DISCONTINUED | OUTPATIENT
Start: 2023-09-06 | End: 2023-09-14 | Stop reason: HOSPADM

## 2023-09-06 RX ORDER — LORAZEPAM 2 MG/ML
1 INJECTION INTRAMUSCULAR
Status: DISCONTINUED | OUTPATIENT
Start: 2023-09-06 | End: 2023-09-14 | Stop reason: HOSPADM

## 2023-09-06 RX ORDER — MAGNESIUM HYDROXIDE/ALUMINUM HYDROXICE/SIMETHICONE 120; 1200; 1200 MG/30ML; MG/30ML; MG/30ML
30 SUSPENSION ORAL EVERY 4 HOURS PRN
Status: CANCELLED | OUTPATIENT
Start: 2023-09-06

## 2023-09-06 RX ORDER — POLYETHYLENE GLYCOL 3350 17 G/17G
17 POWDER, FOR SOLUTION ORAL DAILY PRN
Status: DISCONTINUED | OUTPATIENT
Start: 2023-09-06 | End: 2023-09-14 | Stop reason: HOSPADM

## 2023-09-06 RX ORDER — BENZTROPINE MESYLATE 1 MG/1
1 TABLET ORAL
Status: DISCONTINUED | OUTPATIENT
Start: 2023-09-06 | End: 2023-09-14 | Stop reason: HOSPADM

## 2023-09-06 RX ORDER — BISACODYL 10 MG
10 SUPPOSITORY, RECTAL RECTAL DAILY PRN
Status: DISCONTINUED | OUTPATIENT
Start: 2023-09-06 | End: 2023-09-14 | Stop reason: HOSPADM

## 2023-09-06 RX ORDER — SERTRALINE HYDROCHLORIDE 25 MG/1
50 TABLET, FILM COATED ORAL DAILY
Status: CANCELLED | OUTPATIENT
Start: 2023-09-07

## 2023-09-06 RX ORDER — AMOXICILLIN 250 MG
1 CAPSULE ORAL DAILY PRN
Status: CANCELLED | OUTPATIENT
Start: 2023-09-06

## 2023-09-06 RX ORDER — DIPHENHYDRAMINE HYDROCHLORIDE 50 MG/ML
50 INJECTION INTRAMUSCULAR; INTRAVENOUS EVERY 6 HOURS PRN
Status: DISCONTINUED | OUTPATIENT
Start: 2023-09-06 | End: 2023-09-14 | Stop reason: HOSPADM

## 2023-09-06 RX ORDER — HYDROXYZINE HYDROCHLORIDE 25 MG/1
100 TABLET, FILM COATED ORAL
Status: CANCELLED | OUTPATIENT
Start: 2023-09-06

## 2023-09-06 RX ORDER — ATORVASTATIN CALCIUM 40 MG/1
80 TABLET, FILM COATED ORAL
Status: DISCONTINUED | OUTPATIENT
Start: 2023-09-06 | End: 2023-09-06 | Stop reason: HOSPADM

## 2023-09-06 RX ORDER — BUSPIRONE HYDROCHLORIDE 5 MG/1
5 TABLET ORAL 2 TIMES DAILY
Status: DISCONTINUED | OUTPATIENT
Start: 2023-09-06 | End: 2023-09-06

## 2023-09-06 RX ADMIN — LOSARTAN POTASSIUM 25 MG: 25 TABLET, FILM COATED ORAL at 21:30

## 2023-09-06 RX ADMIN — BUSPIRONE HYDROCHLORIDE 5 MG: 5 TABLET ORAL at 21:29

## 2023-09-06 RX ADMIN — ATORVASTATIN CALCIUM 80 MG: 40 TABLET, FILM COATED ORAL at 17:33

## 2023-09-06 NOTE — ED NOTES
Informed by Josh to now contact [facility] for intake. Spoke with Nhi Kya, awaiting return phone call.         Cielo Bruno RN  09/06/23 0018

## 2023-09-06 NOTE — ED NOTES
Patient speaking with Kwaem Georges from [facility] at this time.       Bharati Alberts RN  09/06/23 0626

## 2023-09-06 NOTE — ED NOTES
Signed 201, intake faxed to [facility] to begin bed search. Aware patient requests SLQ.       Bharati Alberts RN  09/06/23 8355

## 2023-09-06 NOTE — ED PROVIDER NOTES
History  Chief Complaint   Patient presents with   • Psychiatric Evaluation     H/o depression. Reports worsening depression related to health issues and work. Pt wrote a note last night while contemplating suicide. Pt denies following through with with any plans. pt has been taking all prescribed mental health medications. Previous suicide attempt in 2021. Last mental health inpatient admission in 7/2023     Patient with a history of depression. History of suicide attempt 2 years ago by overdosing on pills. Was recently admitted to [address] [place] [address] in July. Now complains of feeling depressed and having suicidal ideation. Plan is to overdose on her pills. Wrote a note last night to her family. Denies any hallucinations. Compliant with her medications. Denies any alcohol or drug use. Would like to go back to [address] [place] [address]. No change in appetite. Decreased sleep. History provided by:  Patient   used: No    Psychiatric Evaluation  Presenting symptoms: depression and suicidal thoughts    Presenting symptoms: no hallucinations and no self-mutilation    Degree of incapacity (severity): Moderate  Onset quality:  Gradual  Timing:  Constant  Progression:  Worsening  Chronicity:  Recurrent  Context: not alcohol use, not medication, not noncompliant and not recent medication change    Treatment compliance: All of the time  Relieved by:  Nothing  Worsened by:  Nothing  Ineffective treatments:  None tried  Associated symptoms: anhedonia, anxiety and decreased need for sleep    Associated symptoms: no abdominal pain, no chest pain, no headaches, no hyperventilation, no poor judgment and no weight change        Prior to Admission Medications   Prescriptions Last Dose Informant Patient Reported? Taking?    Cholecalciferol 1.25 MG (35766 UT) capsule   Yes Yes   Sig: Take 1 capsule by mouth once a week   atorvastatin (LIPITOR) 40 mg tablet Not Taking  No No   Sig: Take 1 tablet (40 mg total) by mouth daily with dinner   Patient not taking: Reported on 2023   busPIRone (BUSPAR) 5 mg tablet 2023  No Yes   Sig: Take 1 tablet (5 mg total) by mouth 2 (two) times a day   Patient taking differently: Take 5 mg by mouth 3 (three) times a day   levothyroxine 75 mcg tablet 2023  No Yes   Sig: Take 1 tablet (75 mcg total) by mouth daily in the early morning   losartan (COZAAR) 25 mg tablet 2023  No Yes   Sig: Take 1 tablet (25 mg total) by mouth 2 (two) times a day   Patient taking differently: Take 75 mg by mouth daily   nabumetone (RELAFEN) 500 mg tablet 2023  Yes Yes   Si mg 2 (two) times a day   rosuvastatin (CRESTOR) 10 MG tablet 2023  Yes Yes   Sig: Take 40 mg by mouth daily   sertraline (ZOLOFT) 25 mg tablet 2023  No Yes   Sig: Take 1 tablet (25 mg total) by mouth daily   Patient taking differently: Take 50 mg by mouth daily   traZODone (DESYREL) 100 mg tablet Not Taking  No No   Sig: Take 1 tablet (100 mg total) by mouth daily at bedtime   Patient not taking: Reported on 2023      Facility-Administered Medications: None       Past Medical History:   Diagnosis Date   • Coronary artery disease    • Disease of thyroid gland    • Hypertension    • MI (myocardial infarction) ([address])    • Suicide ([address])     Attempt to OD on tylenol in        Past Surgical History:   Procedure Laterality Date   • HYSTERECTOMY         History reviewed. No pertinent family history. I have reviewed and agree with the history as documented. E-Cigarette/Vaping   • E-Cigarette Use Never User      E-Cigarette/Vaping Substances     Social History     Tobacco Use   • Smoking status: Never   • Smokeless tobacco: Never   • Tobacco comments:     Non-smoker. Vaping Use   • Vaping Use: Never used   Substance Use Topics   • Alcohol use: Never   • Drug use: Never       Review of Systems   Constitutional: Negative for chills and fever.    HENT: Negative for ear pain, hearing loss, sore throat, trouble swallowing and voice change. Eyes: Negative for pain and discharge. Respiratory: Negative for cough, shortness of breath and wheezing. Cardiovascular: Negative for chest pain and palpitations. Gastrointestinal: Negative for abdominal pain, blood in stool, constipation, diarrhea, nausea and vomiting. Genitourinary: Negative for dysuria, flank pain, frequency and hematuria. Musculoskeletal: Negative for joint swelling, neck pain and neck stiffness. Skin: Negative for rash and wound. Neurological: Negative for dizziness, seizures, syncope, facial asymmetry and headaches. Psychiatric/Behavioral: Positive for sleep disturbance and suicidal ideas. Negative for hallucinations and self-injury. The patient is nervous/anxious. All other systems reviewed and are negative. Physical Exam  Physical Exam  Vitals and nursing note reviewed. Constitutional:       General: She is not in acute distress. Appearance: She is well-developed. HENT:      Head: Normocephalic and atraumatic. Right Ear: External ear normal.      Left Ear: External ear normal.   Eyes:      General: No scleral icterus. Right eye: No discharge. Left eye: No discharge. Extraocular Movements: Extraocular movements intact. Conjunctiva/sclera: Conjunctivae normal.   Cardiovascular:      Rate and Rhythm: Normal rate and regular rhythm. Heart sounds: Normal heart sounds. No murmur heard. Pulmonary:      Effort: Pulmonary effort is normal.      Breath sounds: Normal breath sounds. No wheezing or rales. Abdominal:      General: Bowel sounds are normal. There is no distension. Palpations: Abdomen is soft. Tenderness: There is no abdominal tenderness. There is no guarding or rebound. Musculoskeletal:         General: No deformity. Normal range of motion. Cervical back: Normal range of motion and neck supple. Skin:     General: Skin is warm and dry. Findings: No rash. Neurological:      General: No focal deficit present. Mental Status: She is alert and oriented to person, place, and time. Cranial Nerves: No cranial nerve deficit. Psychiatric:      Comments: Calm and cooperative. Appears slightly anxious. Vital Signs  ED Triage Vitals [09/06/23 1308]   Temperature Pulse Respirations Blood Pressure SpO2   98.1 °F (36.7 °C) 77 16 (!) 182/111 99 %      Temp Source Heart Rate Source Patient Position - Orthostatic VS BP Location FiO2 (%)   Temporal Monitor Sitting Right arm --      Pain Score       --           Vitals:    09/06/23 1308 09/06/23 1355   BP: (!) 182/111 147/93   Pulse: 77    Patient Position - Orthostatic VS: Sitting Sitting         Visual Acuity      ED Medications  Medications   levothyroxine tablet 75 mcg (has no administration in time range)   losartan (COZAAR) tablet 25 mg (25 mg Oral Not Given 9/6/23 1358)   atorvastatin (LIPITOR) tablet 80 mg (80 mg Oral Given 9/6/23 1733)   sertraline (ZOLOFT) tablet 25 mg (25 mg Oral Not Given 9/6/23 1400)   busPIRone (BUSPAR) tablet 5 mg (has no administration in time range)       Diagnostic Studies  Results Reviewed     Procedure Component Value Units Date/Time    T4, free [[id number]]  (Normal) Collected: 09/06/23 1321    Lab Status: Final result Specimen: Blood from Arm, Right Updated: 09/06/23 1810     Free T4 0.83 ng/dL     Narrative:        "Therapeutic range for patients medicated with thyroid disorders: 0.61-1.24 ng/dL."    FLU/RSV/COVID - if FLU/RSV clinically relevant [[id number]]  (Normal) Collected: 09/06/23 1321    Lab Status: Final result Specimen: Nares from Nose Updated: 09/06/23 1405     SARS-CoV-2 Negative     INFLUENZA A PCR Negative     INFLUENZA B PCR Negative     RSV PCR Negative    Narrative:      FOR PEDIATRIC PATIENTS - copy/paste COVID Guidelines URL to browser: [email or web address].[email or web address]    SARS-CoV-2 assay is a Nucleic Acid Amplification assay intended for the  qualitative detection of nucleic acid from SARS-CoV-2 in nasopharyngeal  swabs. Results are for the presumptive identification of SARS-CoV-2 RNA. Positive results are indicative of infection with SARS-CoV-2, the virus  causing COVID-19, but do not rule out bacterial infection or co-infection  with other viruses. Laboratories within the [facility] and its  territories are required to report all positive results to the appropriate  public health authorities. Negative results do not preclude SARS-CoV-2  infection and should not be used as the sole basis for treatment or other  patient management decisions. Negative results must be combined with  clinical observations, patient history, and epidemiological information. This test has not been FDA cleared or approved. This test has been authorized by FDA under an Emergency Use Authorization  (EUA). This test is only authorized for the duration of time the  declaration that circumstances exist justifying the authorization of the  emergency use of an in vitro diagnostic tests for detection of SARS-CoV-2  virus and/or diagnosis of COVID-19 infection under section 564(b)(1) of  the Act, 21 U. S.C. 827SDE-4(X)(7), unless the authorization is terminated  or revoked sooner. The test has been validated but independent review by FDA  and CLIA is pending. Test performed using [employer] GeneXpert: This RT-PCR assay targets N2,  a region unique to SARS-CoV-2. A conserved region in the E-gene was chosen  for pan-Sarbecovirus detection which includes SARS-CoV-2. According to CMS-2020-01-R, this platform meets the definition of high-throughput technology.     TSH, 3rd generation with Free T4 reflex [[id number]]  (Abnormal) Collected: 09/06/23 1321    Lab Status: Final result Specimen: Blood from Arm, Right Updated: 09/06/23 1405     TSH 3RD GENERATON 6.577 uIU/mL     Rapid drug screen, urine [[id number]]  (Normal) Collected: 09/06/23 1325 Lab Status: Final result Specimen: Urine, Clean Catch Updated: 09/06/23 1350     Amph/Meth UR Negative     Barbiturate Ur Negative     Benzodiazepine Urine Negative     Cocaine Urine Negative     Methadone Urine Negative     Opiate Urine Negative     PCP Ur Negative     THC Urine Negative     Oxycodone Urine Negative    Narrative:      FOR MEDICAL PURPOSES ONLY. IF CONFIRMATION NEEDED PLEASE CONTACT THE LAB WITHIN 5 DAYS.     Drug Screen Cutoff Levels:  AMPHETAMINE/METHAMPHETAMINES  1000 ng/mL  BARBITURATES     200 ng/mL  BENZODIAZEPINES     200 ng/mL  COCAINE      300 ng/mL  METHADONE      300 ng/mL  OPIATES      300 ng/mL  PHENCYCLIDINE     25 ng/mL  THC       50 ng/mL  OXYCODONE      100 ng/mL    Comprehensive metabolic panel [[id number]] Collected: 09/06/23 1321    Lab Status: Final result Specimen: Blood from Arm, Right Updated: 09/06/23 1349     Sodium 139 mmol/L      Potassium 3.7 mmol/L      Chloride 104 mmol/L      CO2 28 mmol/L      ANION GAP 7 mmol/L      BUN 20 mg/dL      Creatinine 0.77 mg/dL      Glucose 100 mg/dL      Calcium 9.3 mg/dL      AST 16 U/L      ALT 13 U/L      Alkaline Phosphatase 89 U/L      Total Protein 7.8 g/dL      Albumin 4.6 g/dL      Total Bilirubin 0.49 mg/dL      eGFR 89 ml/min/1.73sq m     Narrative:      [place] guidelines for Chronic Kidney Disease (CKD):   •  Stage 1 with normal or high GFR (GFR > 90 mL/min/1.73 square meters)  •  Stage 2 Mild CKD (GFR = 60-89 mL/min/1.73 square meters)  •  Stage 3A Moderate CKD (GFR = 45-59 mL/min/1.73 square meters)  •  Stage 3B Moderate CKD (GFR = 30-44 mL/min/1.73 square meters)  •  Stage 4 Severe CKD (GFR = 15-29 mL/min/1.73 square meters)  •  Stage 5 End Stage CKD (GFR <15 mL/min/1.73 square meters)  Note: GFR calculation is accurate only with a steady state creatinine    Ethanol [[id number]]  (Normal) Collected: 09/06/23 1321    Lab Status: Final result Specimen: Blood from Arm, Right Updated: 09/06/23 1345 Ethanol Lvl <10 mg/dL     Urine Microscopic [[id number]]  (Abnormal) Collected: 09/06/23 1325    Lab Status: Final result Specimen: Urine, Clean Catch Updated: 09/06/23 1341     RBC, UA None Seen /hpf      WBC, UA 2-4 /hpf      Epithelial Cells Moderate /hpf      Bacteria, UA Occasional /hpf     UA w Reflex to Microscopic w Reflex to Culture [[id number]]  (Abnormal) Collected: 09/06/23 1325    Lab Status: Final result Specimen: Urine, Clean Catch Updated: 09/06/23 1334     Color, UA Yellow     Clarity, UA Clear     Specific Gravity, UA 1.010     pH, UA 5.5     Leukocytes, UA Trace     Nitrite, UA Negative     Protein, UA Negative mg/dl      Glucose, UA Negative mg/dl      Ketones, UA Negative mg/dl      Urobilinogen, UA 1.0 E.U./dl      Bilirubin, UA Negative     Occult Blood, UA Negative    POCT pregnancy, urine [[id number]]  (Normal) Resulted: 09/06/23 1329    Lab Status: Final result Updated: 09/06/23 1329     EXT Preg Test, Ur Negative     Control Valid    CBC and differential [[id number]] Collected: 09/06/23 1321    Lab Status: Final result Specimen: Blood from Arm, Right Updated: 09/06/23 1329     WBC 7.21 Thousand/uL      RBC 4.69 Million/uL      Hemoglobin 14.0 g/dL      Hematocrit 42.2 %      MCV 90 fL      MCH 29.9 pg      MCHC 33.2 g/dL      RDW 12.4 %      MPV 9.0 fL      Platelets 875 Thousands/uL      nRBC 0 /100 WBCs      Neutrophils Relative 57 %      Immat GRANS % 0 %      Lymphocytes Relative 29 %      Monocytes Relative 7 %      Eosinophils Relative 6 %      Basophils Relative 1 %      Neutrophils Absolute 4.15 Thousands/µL      Immature Grans Absolute 0.03 Thousand/uL      Lymphocytes Absolute 2.10 Thousands/µL      Monocytes Absolute 0.47 Thousand/µL      Eosinophils Absolute 0.40 Thousand/µL      Basophils Absolute 0.06 Thousands/µL                  No orders to display              Procedures  Procedures         ED Course  ED Course as of 09/06/23 2016   Wed Sep 06, 2023   [facility] cleared   1722 Patient talked to [place] crisis. 201 signed. Again expressed desire to be admitted to [address]. 2016 Accepted at [address] by Dr. Malou Jesus. Transport pending. Medical Decision Making  Amount and/or Complexity of Data Reviewed  Labs: ordered. Decision-making details documented in ED Course. Discussion of management or test interpretation with external provider(s): Local crisis see the patient. We will do medical clearance. Differential diagnosis includes but not limited to depression, suicidal ideation        Disposition  Final diagnoses:   Depression, unspecified depression type   Suicidal ideations   Hypothyroidism, unspecified type     Time reflects when diagnosis was documented in both MDM as applicable and the Disposition within this note     Time User Action Codes Description Comment    9/6/2023  1:57 PM Santos Ego Add [V52. A] Depression, unspecified depression type     9/6/2023  1:57 PM Santos Ego Add [E24.411] Suicidal ideations     9/6/2023  3:58 PM Maximiliano Hernandez Add [E03.9] Hypothyroidism, unspecified type       ED Disposition     ED Disposition   Transfer to [address] [place] [address]   --    Date/Time   Wed Sep 6, 2023  1:57 PM    Comment   Danica Dee should be transferred out to mental health facility and has been medically cleared.            MD Documentation    Dale Lind Most Recent Value   Patient Condition The patient has been stabilized such that within reasonable medical probability, no material deterioration of the patient condition or the condition of the unborn child(gene) is likely to result from the transfer   Reason for Transfer Level of Care needed not available at this facility   Benefits of Transfer Specialized equipment and/or services available at the receiving facility (Include comment)________________________   Risks of Transfer Potential for delay in receiving treatment, Potential deterioration of medical condition, Loss of IV, Increased discomfort during transfer, Possible worsening of condition or death during transfer   Accepting Physician [address] Name, [address]     (Name & Tel number) Ignacio Vyas with [facility]   Sending MD Hernandez   Provider Certification General risk, such as traffic hazards, adverse weather conditions, rough terrain or turbulence, possible failure of equipment (including vehicle or aircraft), or consequences of actions of persons outside the control of the transport personnel, Unanticipated needs of medical equipment and personnel during transport, Risk of worsening condition, The possibility of a transport vehicle being unavailable      RN Documentation    [address] Name, [address]     (Name & Tel number) Ignacio Vyas with [facility]      Follow-up Information    None         Patient's Medications   Discharge Prescriptions    No medications on file       No discharge procedures on file.     PDMP Review       Value Time User    PDMP Reviewed  Yes 10/18/2020 11:24 AM Samir Larios MD          ED Provider  Electronically Signed by           Shravan Clifford MD  09/06/23 4220 [facility] Megha Cobb MD  09/06/23 2016

## 2023-09-06 NOTE — ED NOTES
[facility] and ready for evaluation.  Stated they are finishing up with a patient and will call to complete eval.      Yehuda Sauer RN  09/06/23 2486

## 2023-09-06 NOTE — ED NOTES
Bp 147/93 [address] [place], [place]  09/06/23 1350   Pt stated she took her bp meds today today      y Monik, RN  09/06/23 9782

## 2023-09-06 NOTE — ED NOTES
Patient is accepted at [facility]  Patient is accepted by Dr. Maria Esther Oliveira per Edmond Nieves. Intake/Crisis    Transportation is arranged with [employer]. Transportation is scheduled for TBA  Patient may go to the floor after 2300        Nurse report is to be called to [phone] prior to patient transfer.

## 2023-09-06 NOTE — ED NOTES
Briana Sheridan from [facility] called stating recommends inpt BH and pt agrees to sign 201. Pt mentioned prefers [facility] if bed available.      Pippa Mckee RN  09/06/23 [id number]

## 2023-09-07 LAB
25(OH)D3 SERPL-MCNC: 29.8 NG/ML (ref 30–100)
ALBUMIN SERPL BCP-MCNC: 4.3 G/DL (ref 3.5–5)
ALP SERPL-CCNC: 75 U/L (ref 34–104)
ALT SERPL W P-5'-P-CCNC: 12 U/L (ref 7–52)
ANION GAP SERPL CALCULATED.3IONS-SCNC: 6 MMOL/L
AST SERPL W P-5'-P-CCNC: 15 U/L (ref 13–39)
BACTERIA UR QL AUTO: ABNORMAL /HPF
BASOPHILS # BLD AUTO: 0.05 THOUSANDS/ÂΜL (ref 0–0.1)
BASOPHILS NFR BLD AUTO: 1 % (ref 0–1)
BILIRUB SERPL-MCNC: 0.52 MG/DL (ref 0.2–1)
BILIRUB UR QL STRIP: NEGATIVE
BUN SERPL-MCNC: 22 MG/DL (ref 5–25)
CALCIUM SERPL-MCNC: 9.6 MG/DL (ref 8.4–10.2)
CAOX CRY URNS QL MICRO: ABNORMAL /HPF
CHLORIDE SERPL-SCNC: 104 MMOL/L (ref 96–108)
CHOLEST SERPL-MCNC: 158 MG/DL
CLARITY UR: CLEAR
CO2 SERPL-SCNC: 31 MMOL/L (ref 21–32)
COLOR UR: YELLOW
CREAT SERPL-MCNC: 0.69 MG/DL (ref 0.6–1.3)
EOSINOPHIL # BLD AUTO: 0.33 THOUSAND/ÂΜL (ref 0–0.61)
EOSINOPHIL NFR BLD AUTO: 5 % (ref 0–6)
ERYTHROCYTE [DISTWIDTH] IN BLOOD BY AUTOMATED COUNT: 12.4 % (ref 11.6–15.1)
FOLATE SERPL-MCNC: 9 NG/ML
GFR SERPL CREATININE-BSD FRML MDRD: 101 ML/MIN/1.73SQ M
GLUCOSE P FAST SERPL-MCNC: 89 MG/DL (ref 65–99)
GLUCOSE SERPL-MCNC: 89 MG/DL (ref 65–140)
GLUCOSE UR STRIP-MCNC: NEGATIVE MG/DL
HCT VFR BLD AUTO: 40.9 % (ref 34.8–46.1)
HDLC SERPL-MCNC: 53 MG/DL
HGB BLD-MCNC: 13.5 G/DL (ref 11.5–15.4)
HGB UR QL STRIP.AUTO: NEGATIVE
IMM GRANULOCYTES # BLD AUTO: 0.03 THOUSAND/UL (ref 0–0.2)
IMM GRANULOCYTES NFR BLD AUTO: 0 % (ref 0–2)
KETONES UR STRIP-MCNC: NEGATIVE MG/DL
LDLC SERPL CALC-MCNC: 77 MG/DL (ref 0–100)
LEUKOCYTE ESTERASE UR QL STRIP: NEGATIVE
LYMPHOCYTES # BLD AUTO: 2.09 THOUSANDS/ÂΜL (ref 0.6–4.47)
LYMPHOCYTES NFR BLD AUTO: 29 % (ref 14–44)
MCH RBC QN AUTO: 30.1 PG (ref 26.8–34.3)
MCHC RBC AUTO-ENTMCNC: 33 G/DL (ref 31.4–37.4)
MCV RBC AUTO: 91 FL (ref 82–98)
MONOCYTES # BLD AUTO: 0.48 THOUSAND/ÂΜL (ref 0.17–1.22)
MONOCYTES NFR BLD AUTO: 7 % (ref 4–12)
NEUTROPHILS # BLD AUTO: 4.23 THOUSANDS/ÂΜL (ref 1.85–7.62)
NEUTS SEG NFR BLD AUTO: 58 % (ref 43–75)
NITRITE UR QL STRIP: NEGATIVE
NON-SQ EPI CELLS URNS QL MICRO: ABNORMAL /HPF
NONHDLC SERPL-MCNC: 105 MG/DL
NRBC BLD AUTO-RTO: 0 /100 WBCS
PH UR STRIP.AUTO: 5.5 [PH]
PLATELET # BLD AUTO: 295 THOUSANDS/UL (ref 149–390)
PMV BLD AUTO: 9.5 FL (ref 8.9–12.7)
POTASSIUM SERPL-SCNC: 4.2 MMOL/L (ref 3.5–5.3)
PROT SERPL-MCNC: 7.6 G/DL (ref 6.4–8.4)
PROT UR STRIP-MCNC: ABNORMAL MG/DL
RBC # BLD AUTO: 4.49 MILLION/UL (ref 3.81–5.12)
RBC #/AREA URNS AUTO: ABNORMAL /HPF
SODIUM SERPL-SCNC: 141 MMOL/L (ref 135–147)
SP GR UR STRIP.AUTO: >=1.03 (ref 1–1.03)
TRIGL SERPL-MCNC: 142 MG/DL
UROBILINOGEN UR STRIP-ACNC: <2 MG/DL
VIT B12 SERPL-MCNC: 340 PG/ML (ref 180–914)
WBC # BLD AUTO: 7.21 THOUSAND/UL (ref 4.31–10.16)
WBC #/AREA URNS AUTO: ABNORMAL /HPF

## 2023-09-07 PROCEDURE — 82306 VITAMIN D 25 HYDROXY: CPT | Performed by: PSYCHIATRY & NEUROLOGY

## 2023-09-07 PROCEDURE — 82607 VITAMIN B-12: CPT | Performed by: PSYCHIATRY & NEUROLOGY

## 2023-09-07 PROCEDURE — 81001 URINALYSIS AUTO W/SCOPE: CPT | Performed by: PSYCHIATRY & NEUROLOGY

## 2023-09-07 PROCEDURE — 85025 COMPLETE CBC W/AUTO DIFF WBC: CPT | Performed by: PSYCHIATRY & NEUROLOGY

## 2023-09-07 PROCEDURE — 80061 LIPID PANEL: CPT | Performed by: PSYCHIATRY & NEUROLOGY

## 2023-09-07 PROCEDURE — 99221 1ST HOSP IP/OBS SF/LOW 40: CPT | Performed by: PHYSICIAN ASSISTANT

## 2023-09-07 PROCEDURE — 82746 ASSAY OF FOLIC ACID SERUM: CPT | Performed by: PSYCHIATRY & NEUROLOGY

## 2023-09-07 PROCEDURE — 99255 IP/OBS CONSLTJ NEW/EST HI 80: CPT | Performed by: PSYCHIATRY & NEUROLOGY

## 2023-09-07 PROCEDURE — 80053 COMPREHEN METABOLIC PANEL: CPT | Performed by: PSYCHIATRY & NEUROLOGY

## 2023-09-07 RX ORDER — MIRTAZAPINE 15 MG/1
7.5 TABLET, FILM COATED ORAL
Status: DISCONTINUED | OUTPATIENT
Start: 2023-09-07 | End: 2023-09-10

## 2023-09-07 RX ORDER — IBUPROFEN 400 MG/1
400 TABLET ORAL EVERY 4 HOURS PRN
Status: DISCONTINUED | OUTPATIENT
Start: 2023-09-07 | End: 2023-09-14 | Stop reason: HOSPADM

## 2023-09-07 RX ORDER — BUSPIRONE HYDROCHLORIDE 5 MG/1
5 TABLET ORAL 3 TIMES DAILY
Status: DISCONTINUED | OUTPATIENT
Start: 2023-09-07 | End: 2023-09-07

## 2023-09-07 RX ORDER — IBUPROFEN 600 MG/1
600 TABLET ORAL EVERY 6 HOURS PRN
Status: DISCONTINUED | OUTPATIENT
Start: 2023-09-07 | End: 2023-09-14 | Stop reason: HOSPADM

## 2023-09-07 RX ORDER — ATORVASTATIN CALCIUM 40 MG/1
40 TABLET, FILM COATED ORAL
Status: DISCONTINUED | OUTPATIENT
Start: 2023-09-07 | End: 2023-09-14 | Stop reason: HOSPADM

## 2023-09-07 RX ORDER — IBUPROFEN 800 MG/1
800 TABLET ORAL EVERY 8 HOURS PRN
Status: DISCONTINUED | OUTPATIENT
Start: 2023-09-07 | End: 2023-09-14 | Stop reason: HOSPADM

## 2023-09-07 RX ADMIN — BUSPIRONE HYDROCHLORIDE 5 MG: 5 TABLET ORAL at 09:00

## 2023-09-07 RX ADMIN — LOSARTAN POTASSIUM 75 MG: 50 TABLET, FILM COATED ORAL at 09:19

## 2023-09-07 RX ADMIN — LEVOTHYROXINE SODIUM 75 MCG: 75 TABLET ORAL at 06:14

## 2023-09-07 RX ADMIN — MIRTAZAPINE 7.5 MG: 15 TABLET, FILM COATED ORAL at 21:12

## 2023-09-07 RX ADMIN — ATORVASTATIN CALCIUM 40 MG: 40 TABLET, FILM COATED ORAL at 16:20

## 2023-09-07 RX ADMIN — SERTRALINE HYDROCHLORIDE 50 MG: 50 TABLET ORAL at 09:00

## 2023-09-07 NOTE — PLAN OF CARE
Pt is new admit    Problem: SELF HARM/SUICIDALITY  Goal: Will have no self-injury during hospital stay  Description: INTERVENTIONS:  - Q 15 MINUTES: Routine safety checks  - Q WAKING SHIFT & PRN: Assess risk to determine if routine checks are adequate to maintain patient safety  - Encourage patient to participate actively in care by formulating a plan to combat response to suicidal ideation, identify supports and resources  Outcome: Progressing     Problem: DEPRESSION  Goal: Will be euthymic at discharge  Description: INTERVENTIONS:  - Administer medication as ordered  - Provide emotional support via 1:1 interaction with staff  - Encourage involvement in milieu/groups/activities  - Monitor for social isolation  Outcome: Progressing     Problem: ANXIETY  Goal: Will report anxiety at manageable levels  Description: INTERVENTIONS:  - Administer medication as ordered  - Teach and encourage coping skills  - Provide emotional support  - Assess patient/family for anxiety and ability to cope  Outcome: Progressing  Goal: By discharge: Patient will verbalize 2 strategies to deal with anxiety  Description: Interventions:  - Identify any obvious source/trigger to anxiety  - Staff will assist patient in applying identified coping technique/skills  - Encourage attendance of scheduled groups and activities  Outcome: Progressing     Problem: SLEEP DISTURBANCE  Goal: Will exhibit normal sleeping pattern  Description: Interventions:  -  Assess the patients sleep pattern, noting recent changes  - Administer medication as ordered  - Decrease environmental stimuli, including noise, as appropriate during the night  - Encourage the patient to actively participate in unit groups and or exercise during the day to enhance ability to achieve adequate sleep at night  - Assess the patient, in the morning, encouraging a description of sleep experience  Outcome: Progressing     Problem: SELF CARE DEFICIT  Goal: Return ADL status to a safe level of function  Description: INTERVENTIONS:  - Administer medication as ordered  - Assess ADL deficits and provide assistive devices as needed  - Obtain PT/OT consults as needed  - Assist and instruct patient to increase activity and self care as tolerated  Outcome: Progressing

## 2023-09-07 NOTE — ASSESSMENT & PLAN NOTE
• Admission labs reviewed, CBC, CMP, RPR, HbA1c, lipid panel, TSH, UA acceptable  • Vitals stable   • UDS negative  • COVID-19 negative  • EKG reveals NSR HR 60, QTc 470 ms  • Medically stable for continued inpatient psychiatric treatment based on available results  • Please contact [facility] with any questions or concerns

## 2023-09-07 NOTE — ED NOTES
Insurance Authorization for admission:   Phone call placed to [facility]. Phone number: [phone]. Spoke to [employer] .     6 days approved. Level of care: 201 IP Psych. Review on 9/11/23 . Authorization # [id number] .

## 2023-09-07 NOTE — NURSING NOTE
Was able to fall asleep within an hour of admission, even though refused Melatonin. Remains asleep at present.

## 2023-09-07 NOTE — CONSULTS
[address]  Consult  Name: Arti Bush 47 y.o. female I MRN: [id number]  Unit/Bed#: -01 I Date of Admission: 9/6/2023   Date of Service: 9/7/2023 I Hospital Day: 1    Inpatient consult for Medical Clearance for [facility] patient  Consult performed by: Phuong Bryant PA-C  Consult ordered by: Sheldon Navarro MD          Assessment/Plan   Hyperlipidemia  Assessment & Plan  On Crestor 10 mg daily  Continue with Lipitor 40 mg while inpatient    Hypertension  Assessment & Plan  Continue losartan 75 mg    Hypothyroidism  Assessment & Plan  Continue levothyroxine 75 mcg daily  TSH elevated, but normal free T4    Medical clearance for psychiatric admission  Assessment & Plan  • Admission labs reviewed, CBC, CMP, RPR, HbA1c, lipid panel, TSH, UA acceptable  • Vitals stable   • UDS negative  • COVID-19 negative  • EKG reveals NSR HR 60, QTc 470 ms  • Medically stable for continued inpatient psychiatric treatment based on available results  • Please contact [facility] with any questions or concerns             Recommendations for Discharge:  · Mary Carmen Isbell will continue to be available for any questions or concerns. · Follow up with PCP upon discharge. Counseling / Coordination of Care Time: 30 minutes. Greater than 50% of total time spent on patient counseling and coordination of care. Collaboration of Care: Were Recommendations Directly Discussed with Primary Treatment Team? - Yes     History of Present Illness: Arti Bush is a 47 y.o. female who is originally admitted to the psychiatric service due to worsening depression with suicidal ideation and plan to overdose on her pills. States she wrote a note to her family last night. . We are consulted for medical clearance for psychiatric hospitalization and medical management. Patient has a past medical history significant for hypothyroidism with a recent TSH elevated but normal T4, currently on levothyroxine 75 mcg daily.   HTN on losartan 75 mg daily. HLD on Crestor. Denies any current chest pain, shortness of breath, lightheadedness, nausea, vomiting, abdominal pain. Reviewed labs, ECG, vital signs. Medically stable for psychiatric admission. Review of Systems:    Review of Systems   Constitutional: Negative for appetite change, chills, fatigue and fever. HENT: Negative for ear pain, sore throat and trouble swallowing. Eyes: Negative for visual disturbance. Respiratory: Negative for cough, chest tightness, shortness of breath and wheezing. Cardiovascular: Negative for chest pain, palpitations and leg swelling. Gastrointestinal: Negative for abdominal distention, abdominal pain, diarrhea, nausea and vomiting. Endocrine: Negative. Genitourinary: Negative for dysuria, flank pain and hematuria. Musculoskeletal: Negative for arthralgias, gait problem and myalgias. Skin: Negative for pallor. Allergic/Immunologic: Negative for immunocompromised state. Neurological: Negative for dizziness, syncope, light-headedness, numbness and headaches. Past Medical and Surgical History:     Past Medical History:   Diagnosis Date   • Coronary artery disease    • Disease of thyroid gland    • Hypertension    • MI (myocardial infarction) ([address])    • Suicide ([address])     Attempt to OD on tylenol in 2021       Past Surgical History:   Procedure Laterality Date   • HYSTERECTOMY         Meds/Allergies:    all medications and allergies reviewed    Allergies: Allergies   Allergen Reactions   • Acetaminophen Other (See Comments)     Pt attempted to OD on this med in the past and doesn't want to take it ever again       Social History:     Marital Status:     Substance Use History:   Social History     Substance and Sexual Activity   Alcohol Use Never     Social History     Tobacco Use   Smoking Status Never   Smokeless Tobacco Never   Tobacco Comments    Non-smoker.      Social History     Substance and Sexual Activity   Drug Use Never       Family History:    non-contributory    Physical Exam:     Vitals:   Blood Pressure: (!) 155/102 (Pt reporting significant physical pain at time of vitals check) (09/07/23 1126)  Pulse: 83 (09/07/23 1126)  Temperature: 98.8 °F (37.1 °C) (09/07/23 1126)  Temp Source: Tympanic (09/07/23 1126)  Respirations: 18 (09/07/23 1126)  Height: 5' 4" (162.6 cm) (09/06/23 2319)  Weight - Scale: 84.4 kg (186 lb) (09/06/23 2319)  SpO2: 100 % (09/07/23 1126)    Physical Exam  Constitutional:       General: She is not in acute distress. HENT:      Head: Normocephalic and atraumatic. Eyes:      Extraocular Movements: Extraocular movements intact. Pupils: Pupils are equal, round, and reactive to light. Cardiovascular:      Rate and Rhythm: Normal rate and regular rhythm. Pulses: Normal pulses. Heart sounds: Normal heart sounds. No murmur heard. No friction rub. No gallop. Pulmonary:      Effort: Pulmonary effort is normal. No respiratory distress. Breath sounds: Normal breath sounds. Abdominal:      General: Abdomen is flat. There is no distension. Palpations: Abdomen is soft. Tenderness: There is no abdominal tenderness. Musculoskeletal:         General: Normal range of motion. Cervical back: Normal range of motion. Skin:     General: Skin is warm and dry. Neurological:      General: No focal deficit present. Mental Status: She is alert.       Comments: CN II-XII intact  Ambulated without difficulty/ataxia           Additional Data:     Lab Results:     Results from last 7 days   Lab Units 09/07/23  0547   WBC Thousand/uL 7.21   HEMOGLOBIN g/dL 13.5   HEMATOCRIT % 40.9   PLATELETS Thousands/uL 295   NEUTROS PCT % 58   LYMPHS PCT % 29   MONOS PCT % 7   EOS PCT % 5     Results from last 7 days   Lab Units 09/07/23  0546   SODIUM mmol/L 141   POTASSIUM mmol/L 4.2   CHLORIDE mmol/L 104   CO2 mmol/L 31   BUN mg/dL 22   CREATININE mg/dL 0.69   ANION GAP mmol/L 6   CALCIUM mg/dL 9.6   ALBUMIN g/dL 4.3   TOTAL BILIRUBIN mg/dL 0.52   ALK PHOS U/L 75   ALT U/L 12   AST U/L 15   GLUCOSE RANDOM mg/dL 89             Lab Results   Component Value Date/Time    HGBA1C 5.6 05/05/2021 09:26 AM    HGBA1C 5.6 10/18/2020 09:22 AM               Imaging: I have personally reviewed pertinent reports. No orders to display       EKG, Pathology, and Other Studies Reviewed on Admission:   · EKG: see above    ** Please Note: This note has been constructed using a voice recognition system.  **

## 2023-09-07 NOTE — TREATMENT PLAN
TREATMENT PLAN REVIEW - [address] [place] [address] 47 y.o. 1970 female MRN: [id number]    Inge Mancini Room / Bed: [place] [address]/[place] [address] Encounter: [id number]          Admit Date/Time:  9/6/2023 11:02 PM    Treatment Team: Attending Provider: Brianne Fontenot DO; Care Manager: David Sanabria RN; Patient Care Assistant: Jossie Gibson; Patient Care Technician: Brianne Stokes; Licensed Practical Nurse: Hailee Caballero LPN; Charge Nurse: Tamara Jamil, BJ; Charge Nurse: Christie Rowell RN; Occupational Therapy Assistant: Brandon Merrill IV    Diagnosis: Principal Problem:    Recurrent major depressive disorder [facility]  Active Problems:    Anxiety    Hypothyroidism    Hypertension    Hyperlipidemia      Patient Strengths/Assets: ability for insight    Patient Barriers/Limitations: financial instability, poor physical health    Short Term Goals: decrease in depressive symptoms, decrease in anxiety symptoms, decrease in suicidal thoughts    Long Term Goals: improvement in depression, improvement in anxiety, free of suicidal thoughts    Progress Towards Goals: starting psychiatric medications as prescribed, improving gradually    Recommended Treatment: medication management, patient medication education, group therapy, milieu therapy, continued Behavioral Health psychiatric evaluation/assessment process    Treatment Frequency: daily medication monitoring, group and milieu therapy daily, monitoring through interdisciplinary rounds, monitoring through weekly patient care conferences    Expected Discharge Date:  7 days    Discharge Plan: discharge to home    Treatment Plan Created/Updated By: Brianne Fontenot DO

## 2023-09-07 NOTE — TREATMENT TEAM
09/07/23 1230   Activity/Group Checklist   Group Other (Comment)  (art therapy)   Attendance Attended   Attendance Duration (min) 46-60   Interactions Interacted appropriately   Affect/Mood Appropriate;Calm   Goals Achieved Identified feelings; Able to listen to others; Able to engage in interactions; Other (Comment)  (authentic, spontaneous self expression, connection, and insight)

## 2023-09-07 NOTE — EMTALA/ACUTE CARE TRANSFER
[address] [address]  Dept: [phone]      EMTALA TRANSFER CONSENT    NAME [address] [place] [address]                                         1970                              MRN [id number]    I have been informed of my rights regarding examination, treatment, and transfer   by Dr. Pricilla Valdes MD    Benefits: Specialized equipment and/or services available at the receiving facility (Include comment)________________________    Risks: Potential for delay in receiving treatment, Potential deterioration of medical condition, Loss of IV, Increased discomfort during transfer, Possible worsening of condition or death during transfer          I authorize the performance of emergency medical procedures and treatments upon me in both transit and upon arrival at the receiving facility. Additionally, I authorize the release of any and all medical records to the receiving facility and request they be transported with me, if possible. I understand that the safest mode of transportation during a medical emergency is an ambulance and that the Hospital advocates the use of this mode of transport. Risks of traveling to the receiving facility by car, including absence of medical control, life sustaining equipment, such as oxygen, and medical personnel has been explained to me and I fully understand them. (KENDALL CORRECT BOX BELOW)  [  ]  I consent to the stated transfer and to be transported by ambulance/helicopter. [  ]  I consent to the stated transfer, but refuse transportation by ambulance and accept full responsibility for my transportation by car.   I understand the risks of non-ambulance transfers and I exonerate the Hospital and its staff from any deterioration in my condition that results from this refusal.    X___________________________________________    DATE  23  TIME________  Signature of patient or legally responsible individual signing on patient behalf           RELATIONSHIP TO PATIENT_________________________          Provider Certification    NAME Eze DOE 1970                              MRN [id number]    A medical screening exam was performed on the above named patient. Based on the examination:    Condition Necessitating Transfer The primary encounter diagnosis was Depression, unspecified depression type. Diagnoses of Suicidal ideations and Hypothyroidism, unspecified type were also pertinent to this visit. Patient Condition: The patient has been stabilized such that within reasonable medical probability, no material deterioration of the patient condition or the condition of the unborn child(gene) is likely to result from the transfer    Reason for Transfer: Level of Care needed not available at this facility    Transfer Requirements: [facility], [address] [place] [address]   · Space available and qualified personnel available for treatment as acknowledged by Deven Blackwell with [facility]. Luamy's crisis  · Agreed to accept transfer and to provide appropriate medical treatment as acknowledged by       [place]  · Appropriate medical records of the examination and treatment of the patient are provided at the time of transfer   [address] _______  · Transfer will be performed by qualified personnel from    and appropriate transfer equipment as required, including the use of necessary and appropriate life support measures.     Provider Certification: I have examined the patient and explained the following risks and benefits of being transferred/refusing transfer to the patient/family:  General risk, such as traffic hazards, adverse weather conditions, rough terrain or turbulence, possible failure of equipment (including vehicle or aircraft), or consequences of actions of persons outside the control of the transport personnel, Unanticipated needs of medical equipment and personnel during transport, Risk of worsening condition, The possibility of a transport vehicle being unavailable      Based on these reasonable risks and benefits to the patient and/or the unborn child(gene), and based upon the information available at the time of the patient’s examination, I certify that the medical benefits reasonably to be expected from the provision of appropriate medical treatments at another medical facility outweigh the increasing risks, if any, to the individual’s medical condition, and in the case of labor to the unborn child, from effecting the transfer.     X____________________________________________ DATE 09/06/23        TIME_______      ORIGINAL - SEND TO MEDICAL RECORDS   COPY - SEND WITH PATIENT DURING TRANSFER

## 2023-09-07 NOTE — NURSING NOTE
Pt remains pleasant and social with peers on the unit, attending groups throughout the day. Denies SI/HI, AVH. Reports having fluctuating anxiety and elevated depression. Tearful in conversation, states feeling hopeful that treatment will be helpful in decreasing symptoms. Pt states "it helped me ast time I was here so I wanted to come back." pt denies any questions at this time.

## 2023-09-07 NOTE — H&P
Psychiatric Evaluation - [address] [facility] 47 y.o. female MRN: [id number]  Unit/Bed#: [facility] 207-01 Encounter: [id number]    Assessment/Plan   Principal Problem:    Recurrent major depressive disorder ([address])  Active Problems:    Anxiety    Hypothyroidism    Hypertension    Hyperlipidemia      Plan:   Start Remeron 7.5mg PO HS; DC Zoloft and Buspar and Melatonin  Admit to [facility] psychiatry. Medical management per SLIM. Check admission labs: CMP, CBC, TSH, RPR, UDS, Lipid Panel, A1c. Collaborate with case management for baseline assessment and disposition planning.   Chart reviewed and case discussed with treatment team.  Start/continue the following medications:  Current Facility-Administered Medications   Medication Dose Route Frequency Provider Last Rate   • aluminum-magnesium hydroxide-simethicone  30 mL Oral Q4H PRN Prtayla Garcia MD     • haloperidol lactate  2.5 mg Intramuscular Q4H PRN Max 4/day Otf Garcia MD      And   • LORazepam  1 mg Intramuscular Q4H PRN Max 4/day Otf Garcia MD      And   • benztropine  0.5 mg Intramuscular Q4H PRN Max 4/day Otf Garcia MD     • haloperidol lactate  5 mg Intramuscular Q4H PRN Max 4/day Otf Garcia MD      And   • LORazepam  2 mg Intramuscular Q4H PRN Max 4/day Prtayla Garcia MD      And   • benztropine  1 mg Intramuscular Q4H PRN Max 4/day Prrosannay MD Jose     • benztropine  1 mg Oral Q4H PRN Max 6/day Otf Garcia MD     • bisacodyl  10 mg Rectal Daily PRN Otf Garcia MD     • hydrOXYzine HCL  50 mg Oral Q6H PRN Max 4/day Otf Garcia MD      Or   • diphenhydrAMINE  50 mg Intramuscular Q6H PRN Otf Garcia MD     • haloperidol  1 mg Oral Q6H PRN Prtayla Garcia MD     • haloperidol  2.5 mg Oral Q4H PRN Max 4/day Prtayla Garcia MD     • haloperidol  5 mg Oral Q4H PRN Max 4/day Otf Garcia MD     • hydrOXYzine HCL  100 mg Oral Q6H PRN Max 4/day Melissa SIMON Elena Miller MD      Or   • LORazepam  2 mg Intramuscular Q6H PRN Bety Greco MD     • hydrOXYzine HCL  25 mg Oral Q6H PRN Max 4/day Bety Greco MD     • ibuprofen  400 mg Oral Q4H PRN Bety Greco MD     • ibuprofen  600 mg Oral Q6H PRN Bety Greco MD     • ibuprofen  800 mg Oral Q8H PRN Bety Greco MD     • levothyroxine  75 mcg Oral Early Morning Bety Greco MD     • losartan  75 mg Oral Daily Bety Greco MD     • mirtazapine  7.5 mg Oral HS Rodolfo June DO     • polyethylene glycol  17 g Oral Daily PRN Bety Greco MD     • senna-docusate sodium  1 tablet Oral Daily PRN Bety Greco MD     • traZODone  50 mg Oral HS PRN Bety Greco MD         Treatment options and alternatives were reviewed with the patient, who concurs with the above plan. Risks, benefits, and possible side effects of medications were explained to the patient, and she verbalizes understanding.      -----------------------------------    Chief Complaint: SI    History of Present Illness     Per ED provider note:  Patient with a history of depression. History of suicide attempt 2 years ago by overdosing on pills. Was recently admitted to [address] [place] [address] in July. Now complains of feeling depressed and having suicidal ideation. Plan is to overdose on her pills. Wrote a note last night to her family. Denies any hallucinations. Compliant with her medications. Denies any alcohol or drug use. Would like to go back to [address] [place] [address]. No change in appetite. Decreased sleep. On admission to Inpatient Psychiatric Unit:  Patient is a 52-year-old white female with a past psychiatric history of major depressive disorder with anxiety component who presents voluntarily to inpatient [facility] with suicidal ideation with a plan to overdose.     Symptoms prior to hospitalization include: Depressed mood, suicidal ideation with plan to overdose on pills, decreased energy, decreased concentration, decreased motivation, hopelessness, helplessness, insomnia, and nightmares. Timeline is progressively worsening over the course of the past month and a half. Severity is rated as severe. Mitigating factors are none. Exacerbating stressors include work stress interacting with other employees, health stress related to chronic Achilles/foot pain, and financial stress. Today, the patient states that she has had a stressful time at home, specifically with work, her health, and her finances. She states that she was feeling increasing pressure as a  at [employer], and went to human resources to complain about interactions with another staff member there who is also a manager. 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 She is also experiencing health stress related to chronic foot and Achilles pain that is not treatable. She also has financial stress. As a result of these stressors, the patient has been depressed and suicidal with a plan to overdose. She has 1 prior suicide attempt. She has been experiencing nightmares and decreased sleep. She states that her Zoloft and BuSpar which she was taking before are no longer helping her and she is agreeable with plan to start Remeron. She denies manic or psychotic symptoms. She admits to generalized worrying and anxiety. Psychiatric Review Of Systems:  Medication side effects: none  Sleep: poor, nightmares+  Appetite: no change  Hygiene: able to tend to instrumental and basic ADLs  Anxiety Symptoms: per HPI  Psychotic Symptoms: denies  Depression Symptoms: per HPI  Manic Symptoms: denies  PTSD Symptoms: denies  Suicidal Thoughts: per HPI  Homicidal Thoughts: denies    Medical Review Of Systems:   Patient denies headache or dizziness.    Patient denies chest pain or palpitations. Patient denies difficulty breathing or wheezing. Patient denies nausea, vomiting, or diarrhea. Patient denies polyuria or polydipsia. Patient denies weakness or numbness. Pertinent positives as per HPI. Historical Information     Psychiatric History:   Diagnoses: MDD  Inpatient Hx: 1 (QU in July)  Outpatient Hx: None; PCP prescribes  Medications/Trials: Zoloft and Buspar  Has a therapist    Substance Abuse History:  Social History     Substance and Sexual Activity   Alcohol Use Never     Social History     Substance and Sexual Activity   Drug Use Never       I spent time with Jodi in counseling and education on risk of substance abuse. I assessed motivation and encouraged her for treatment as appropriate. Family History:   History reviewed. No pertinent family history. Social History:  Highest education: 11th (has GED)  Currently living: Alone  Relationships:   Children: 1  Occupation: [employer] manager  No legal hx  No  hx    Rest of social history as per below:    Social History     Socioeconomic History   • Marital status:      Spouse name: Not on file   • Number of children: Not on file   • Years of education: Not on file   • Highest education level: Not on file   Occupational History   • Not on file   Tobacco Use   • Smoking status: Never   • Smokeless tobacco: Never   • Tobacco comments:     Non-smoker.    Vaping Use   • Vaping Use: Never used   Substance and Sexual Activity   • Alcohol use: Never   • Drug use: Never   • Sexual activity: Not Currently   Other Topics Concern   • Not on file   Social History Narrative   • Not on file     Social Determinants of Health     Financial Resource Strain: Not on file   Food Insecurity: Not on file   Transportation Needs: Not on file   Physical Activity: Not on file   Stress: Not on file   Social Connections: Not on file   Intimate Partner Violence: Not on file   Housing Stability: Not on file       Past Medical History:   Past Medical History:   Diagnosis Date   • Coronary artery disease    • Disease of thyroid gland    • Hypertension    • MI (myocardial infarction) ([address])    • Suicide ([address])     Attempt to OD on tylenol in 2021        -----------------------------------  Objective    Temp:  [98.1 °F (36.7 °C)-98.9 °F (37.2 °C)] 98.3 °F (36.8 °C)  HR:  [63-77] 63  Resp:  [16-17] 17  BP: (131-182)/() 131/84    Mental Status Evaluation:  Appearance: casually dressed, consistent with stated age  Motor: +psychomotor retardation, no gait abnormalities  Behavior: cooperative, answers questions appropriately  Speech: soft, normal rhythm  Mood: "depressed"  Affect: constricted, depressed-appearing  Thought Process: linear and goal-oriented  Thought Content: denies auditory hallucinations, denies visual hallucinations, denies delusions  Risk Potential: denies suicidal ideation, plan, or intent. Denies homicidal ideation  Sensorium: Oriented to person, place, time, and situation  Cognition: cognitive ability appears intact but was not quantitatively tested  Consciousness: alert and awake  Attention: intact, able to focus without difficulty  Insight: fair  Judgement: limited        Meds/Allergies   Allergies   Allergen Reactions   • Acetaminophen Other (See Comments)     Pt attempted to OD on this med in the past and doesn't want to take it ever again         Behavioral Health Medications: all current active meds have been reviewed as per above. Changes as per above. Risks, benefits, indications, and alternatives to treatment were discussed with patient. Laboratory results:  I have personally reviewed all pertinent laboratory/tests results.   Recent Results (from the past 48 hour(s))   CBC and differential    Collection Time: 09/06/23  1:21 PM   Result Value Ref Range    WBC 7.21 4.31 - 10.16 Thousand/uL    RBC 4.69 3.81 - 5.12 Million/uL    Hemoglobin 14.0 11.5 - 15.4 g/dL    Hematocrit 42.2 34.8 - 46.1 %    MCV 90 82 - 98 fL MCH 29.9 26.8 - 34.3 pg    MCHC 33.2 31.4 - 37.4 g/dL    RDW 12.4 11.6 - 15.1 %    MPV 9.0 8.9 - 12.7 fL    Platelets 911 839 - 438 Thousands/uL    nRBC 0 /100 WBCs    Neutrophils Relative 57 43 - 75 %    Immat GRANS % 0 0 - 2 %    Lymphocytes Relative 29 14 - 44 %    Monocytes Relative 7 4 - 12 %    Eosinophils Relative 6 0 - 6 %    Basophils Relative 1 0 - 1 %    Neutrophils Absolute 4.15 1.85 - 7.62 Thousands/µL    Immature Grans Absolute 0.03 0.00 - 0.20 Thousand/uL    Lymphocytes Absolute 2.10 0.60 - 4.47 Thousands/µL    Monocytes Absolute 0.47 0.17 - 1.22 Thousand/µL    Eosinophils Absolute 0.40 0.00 - 0.61 Thousand/µL    Basophils Absolute 0.06 0.00 - 0.10 Thousands/µL   Comprehensive metabolic panel    Collection Time: 09/06/23  1:21 PM   Result Value Ref Range    Sodium 139 135 - 147 mmol/L    Potassium 3.7 3.5 - 5.3 mmol/L    Chloride 104 96 - 108 mmol/L    CO2 28 21 - 32 mmol/L    ANION GAP 7 mmol/L    BUN 20 5 - 25 mg/dL    Creatinine 0.77 0.60 - 1.30 mg/dL    Glucose 100 65 - 140 mg/dL    Calcium 9.3 8.4 - 10.2 mg/dL    AST 16 13 - 39 U/L    ALT 13 7 - 52 U/L    Alkaline Phosphatase 89 34 - 104 U/L    Total Protein 7.8 6.4 - 8.4 g/dL    Albumin 4.6 3.5 - 5.0 g/dL    Total Bilirubin 0.49 0.20 - 1.00 mg/dL    eGFR 89 ml/min/1.73sq m   TSH, 3rd generation with Free T4 reflex    Collection Time: 09/06/23  1:21 PM   Result Value Ref Range    TSH 3RD GENERATON 6.577 (H) 0.450 - 4.500 uIU/mL   Ethanol    Collection Time: 09/06/23  1:21 PM   Result Value Ref Range    Ethanol Lvl <10 <10 mg/dL   FLU/RSV/COVID - if FLU/RSV clinically relevant    Collection Time: 09/06/23  1:21 PM    Specimen: Nose; Nares   Result Value Ref Range    SARS-CoV-2 Negative Negative    INFLUENZA A PCR Negative Negative    INFLUENZA B PCR Negative Negative    RSV PCR Negative Negative   T4, free    Collection Time: 09/06/23  1:21 PM   Result Value Ref Range    Free T4 0.83 0.61 - 1.12 ng/dL   UA w Reflex to Microscopic w Reflex to Culture    Collection Time: 09/06/23  1:25 PM    Specimen: Urine, Clean Catch   Result Value Ref Range    Color, UA Yellow     Clarity, UA Clear     Specific Gravity, UA 1.010 1.003 - 1.030    pH, UA 5.5 4.5, 5.0, 5.5, 6.0, 6.5, 7.0, 7.5, 8.0    Leukocytes, UA Trace (A) Negative    Nitrite, UA Negative Negative    Protein, UA Negative Negative mg/dl    Glucose, UA Negative Negative mg/dl    Ketones, UA Negative Negative mg/dl    Urobilinogen, UA 1.0 0.2, 1.0 E.U./dl E.U./dl    Bilirubin, UA Negative Negative    Occult Blood, UA Negative Negative   Rapid drug screen, urine    Collection Time: 09/06/23  1:25 PM   Result Value Ref Range    Amph/Meth UR Negative Negative    Barbiturate Ur Negative Negative    Benzodiazepine Urine Negative Negative    Cocaine Urine Negative Negative    Methadone Urine Negative Negative    Opiate Urine Negative Negative    PCP Ur Negative Negative    THC Urine Negative Negative    Oxycodone Urine Negative Negative   Urine Microscopic    Collection Time: 09/06/23  1:25 PM   Result Value Ref Range    RBC, UA None Seen None Seen, 0-1, 1-2, 2-4, 0-5 /hpf    WBC, UA 2-4 None Seen, 0-1, 1-2, 0-5, 2-4 /hpf    Epithelial Cells Moderate (A) None Seen, Occasional /hpf    Bacteria, UA Occasional None Seen, Occasional /hpf   POCT pregnancy, urine    Collection Time: 09/06/23  1:29 PM   Result Value Ref Range    EXT Preg Test, Ur Negative     Control Valid         Progress Toward Goals & Illness Status: Patient is not at goal. They are psychiatrically unstable and are not yet ready for discharge. The patient's condition currently requires active psychopharmacological medication management, interdisciplinary coordination with case management, and the utilization of adjunctive milieu and group therapy to augment psychopharmacological efficacy. The patient's risk of morbidity, and progression or decompensation of psychiatric disease, is high without this current treatment. -----------------------------------    Risks / Benefits of Treatment:     Risks, benefits, and possible side effects of medications explained to patient. The patient verbalizes understanding and agreement for treatment. Counseling / Coordination of Care:     Patient's presentation on admission and proposed treatment plan were discussed with the treatment team.  Diagnosis, medication changes and treatment plan were reviewed with the patient. Recent stressors were discussed with the patient. Events leading to admission were reviewed with the patient. Importance of medication and treatment compliance was reviewed with the patient. Inpatient Psychiatric Certification:     Certification: Based upon physical, mental and social evaluations, I certify that inpatient psychiatric services are medically necessary for this patient for a duration of 5-7 midnights (exact duration TBD pending patient's response to psychiatric treatment) for the diagnosis listed above. Available alternative community resources do not meet the patient's mental health care needs. I further attest that an established written individualized plan of care has been implemented and is outlined in the patient's medical records. This note has been constructed using a voice recognition system. There may be translation, syntax, or grammatical errors. If you have any questions, please contact the dictating provider.

## 2023-09-07 NOTE — NURSING NOTE
1 Green wallet w/checkbook, cards, c/c, medical cards, etc., DL, pens, bandaids, nail clipper, $3.00  Black/beige striped bag  1 bar of soap  Brush  Glasses w/ case  Body spray  Deodorant  Bottle pink fluid  1 pair of sweatpants  Black slippers  5 shorts  Leggings  7 s/s shirts  5 sports bras  2 underwear  1 usb  1 pair of black sandals  1 plastic bracelet

## 2023-09-07 NOTE — PROGRESS NOTES
09/07/23 1330   Activity/Group Checklist   Group Life Skills  (Handling difficult situations)   Attendance Attended   Attendance Duration (min) 46-60   Interactions Interacted appropriately   Affect/Mood Appropriate   Goals Achieved Identified feelings; Discussed coping strategies; Able to listen to others; Able to engage in interactions; Able to reflect/comment on own behavior;Able to self-disclose; Able to recieve feedback  (Discussed ABC model for handling difficult situations, and the relationship between thoughts, feelings, and behaviors. Applied model to personal scenarios and shared with the group.  Interacted with peers when discussing personal examples.)

## 2023-09-07 NOTE — NURSING NOTE
Patient admitted on a 201 commitment status from [address] [place] [address] wolfgang to [place] with plan to OD. She report that her current stressors include work, health issues, and financial strain. She Endorses depression with symptoms of anhedonia, anergia, sleep disturbances, crying spells, and low motivation. Worsening anxiety recently without panic attacks. She reports having recurrent nightmares of suicide attempts at least weekly. She has one past suicide attempt in 2021 via overdose on Tylenol. She was hospitalized at [address] [place] [address] in July 2023 for SI as well. She is established with outpatient therapy services and her PCP prescribes medications. Supports include family and friends. She is amenable to treatment and would like medication adjustments as well as referrals to outpatient psychiatrist. Maintained on Q 7 minute checks.

## 2023-09-07 NOTE — PROGRESS NOTES
Attended group alongside other people on the unit, and participated in discussion around recovery-centered topic.      09/07/23 1000 09/07/23 9180   Activity/Group Checklist   Group Community meeting  (10:00 AM - 10:30 AM - [employer] - Advocacy Among the [employer]) Other (Comment)   Attendance Attended Attended  (Left before end of this group, did not return to this group.)   Attendance Duration (min) 16-30 31-45   Interactions Interacted appropriately Interacted appropriately   Affect/Mood Appropriate Appropriate   Goals Achieved Other (Comment)  (Engaged with CPS and peers on the unit by learning from peers and interview about experiences of different forms of homelessness. Examined parallels between physical health advocacy, mental health advocacy, and housing advocacy. Outlined upcoming groups.) Other (Comment)  (Engaged with CPS and peers on the unit by listening to spoken word poetry from authors on the autism spectrum. Partnered on self-advocacy effort to use supportive language.  Reviewed impact of day’s groups on mental health, outlined next day’s groups.)

## 2023-09-08 PROCEDURE — 99232 SBSQ HOSP IP/OBS MODERATE 35: CPT | Performed by: PSYCHIATRY & NEUROLOGY

## 2023-09-08 RX ADMIN — LOSARTAN POTASSIUM 75 MG: 50 TABLET, FILM COATED ORAL at 09:08

## 2023-09-08 RX ADMIN — LEVOTHYROXINE SODIUM 75 MCG: 75 TABLET ORAL at 06:27

## 2023-09-08 RX ADMIN — MIRTAZAPINE 7.5 MG: 15 TABLET, FILM COATED ORAL at 21:21

## 2023-09-08 RX ADMIN — ATORVASTATIN CALCIUM 40 MG: 40 TABLET, FILM COATED ORAL at 17:21

## 2023-09-08 NOTE — NURSING NOTE
Pt denies SI/HI, reports having elevated depression however continues to remain hopeful with treatment, this will improve. Pt remains withdrawn to room, sleeping at times. Social with peers on the unit. Reviewed scheduled medications with pt. Encouraged attendance to groups which pt is agreeable with. Denies any further questions at this time.

## 2023-09-08 NOTE — NURSING NOTE
Pt weepy and voices an increase in anxiety. Reports her son is trying to put her in a group home. Pt refuses offer of PRN medication. Resting in bed quietly. Denies SI/HI/AVH will continue to monitor.

## 2023-09-08 NOTE — CASE MANAGEMENT
ANAHI called pt Employer - [employer] and spoke to [employer] ([phone]). CM requested email to send admission letter to them. Nomi provided email:     [email or web address]    Nomi reported she will forward the letter to their HR office. CM will send a discharge letter with return date when pt is scheduled for dc. CM emailed admission letter to above email.

## 2023-09-08 NOTE — CASE MANAGEMENT
INTAKE    Readmit score:  Yellow 17   Confirmed Address   [address]   [place]     Resides in the home with/can return?:    Pt lives alone and can return    Confirmed Phone Number: [phone]    Commitment Status/Admitted from: [facility] ED   Presenting C/O:             ED Note   "  Psychiatric Evaluation        H/o depression. Reports worsening depression related to health issues and work. Pt wrote a note last night while contemplating suicide. Pt denies following through with with any plans. pt has been taking all prescribed mental health medications. Previous suicide attempt in 2021. Last mental health inpatient admission in 7/2023      Patient with a history of depression. History of suicide attempt 2 years ago by overdosing on pills. Was recently admitted to [address] [place] [address] in July. Now complains of feeling depressed and having suicidal ideation. Plan is to overdose on her pills. Wrote a note last night to her family. Denies any hallucinations. Compliant with her medications. Denies any alcohol or drug use. Would like to go back to [address] [place] [address]. No change in appetite. Decreased sleep."     Psychiatrist:    During last admission in May 2023, pt was referred to [facility] and and had Intake on 7/25/23. Pt reported that she went to the appt, she reported she got lost and was about 30 mins late and when she got there they told her that she did not have insurance but she reported she does and tried to convince them. Pt reported she does not wish to return there. Pt is open to Psychiatrist in [facility]. Therapist:    Pt reported that she had called Crisis and they referred her to a Therapist in [facility], Maricarmen Estrada. Pt reported that she likes her and will contact her after dc to schedule an appt. ACT/ICM/CPS/WRT/SC: CM spoke to pt about ICM referral and pt is in agreement.       PCP:    Eli Johns, DO  [phone]    Pt reported her PCP will be moving to [place] and he told her she will need to find a new PCP by November. Pt reported she is not sure she wants to go with one in the same practice or find another. Work/Income:      Pt works Full-Time at [employer] reported frustration about her employer and is not sure she will continue working there. Pt reported "they told me they need three things before I can return. .. a note showing when I was admitted, a note saying when I am discharged and a note showing when I can return."     CM informed pt that CM can send those letters. Pt provided CM with phone number for Mateo Chamberlain ([phone]) to find out an email or fax number to send letter to. Pt spoke about wanting to apply for Social Security. Legal/  Probation/[place] [facility]:    Denies   Access to Firearms:    Denies   Referrals Needed: OP Psychiatrist, ICM    Transport at Discharge:    TBD    IMM:   [place] Text HARRY: N/A   Emergency Contact:     Pt would like to add her mom Edwin as Emergency Contact and remove her Son Giovanny Holland.      CM removed son   Added: Edwin (Mom- [phone])   ROIs obtained:       [address] [facility] OP Referral for Psychiatry  ICM referral    Insurance:     [facility]    Audit:        PAWSS:  BAT:  UDS: Negative

## 2023-09-08 NOTE — PROGRESS NOTES
Diagnosis of  Recurrent major depressive disorder reviewed. Short term goals for decrease in depressive symptoms, decrease in anxiety symptoms, decrease in suicidal thoughts discussed. All present parties in agreement and treatment plan signed.     09/08/23 1341   Team Meeting   Meeting Type Tx Team Meeting   Team Members Present   Team Members Present Physician;Nurse;   Physician Team Member Dr. Gee Menjivar Team Member [facility] Management Team Member Chaz   Patient/Family Present   Patient Present No  (Pt declined to meet with treatment team)   Patient's Family Present No

## 2023-09-08 NOTE — PROGRESS NOTES
Progress Note - Behavioral Health   Jodi Dee 47 y.o. female MRN: [id number]  Unit/Bed#: [facility] 207-01 Encounter: [id number]    Assessment:  Principal Problem:    Recurrent major depressive disorder ([address])  Active Problems:    Anxiety    Medical clearance for psychiatric admission    Hypothyroidism    Hypertension    Hyperlipidemia      Plan:  --Continue with psychiatric hospitalization  --Continue with individual, group, and milieu therapy  --Continue the following medications:  Current Facility-Administered Medications   Medication Dose Route Frequency   • aluminum-magnesium hydroxide-simethicone (MAALOX) oral suspension 30 mL  30 mL Oral Q4H PRN   • atorvastatin (LIPITOR) tablet 40 mg  40 mg Oral Daily With Dinner   • haloperidol lactate (HALDOL) injection 2.5 mg  2.5 mg Intramuscular Q4H PRN Max 4/day    And   • LORazepam (ATIVAN) injection 1 mg  1 mg Intramuscular Q4H PRN Max 4/day    And   • benztropine (COGENTIN) injection 0.5 mg  0.5 mg Intramuscular Q4H PRN Max 4/day   • haloperidol lactate (HALDOL) injection 5 mg  5 mg Intramuscular Q4H PRN Max 4/day    And   • LORazepam (ATIVAN) injection 2 mg  2 mg Intramuscular Q4H PRN Max 4/day    And   • benztropine (COGENTIN) injection 1 mg  1 mg Intramuscular Q4H PRN Max 4/day   • benztropine (COGENTIN) tablet 1 mg  1 mg Oral Q4H PRN Max 6/day   • bisacodyl (DULCOLAX) rectal suppository 10 mg  10 mg Rectal Daily PRN   • hydrOXYzine HCL (ATARAX) tablet 50 mg  50 mg Oral Q6H PRN Max 4/day    Or   • diphenhydrAMINE (BENADRYL) injection 50 mg  50 mg Intramuscular Q6H PRN   • haloperidol (HALDOL) tablet 1 mg  1 mg Oral Q6H PRN   • haloperidol (HALDOL) tablet 2.5 mg  2.5 mg Oral Q4H PRN Max 4/day   • haloperidol (HALDOL) tablet 5 mg  5 mg Oral Q4H PRN Max 4/day   • hydrOXYzine HCL (ATARAX) tablet 100 mg  100 mg Oral Q6H PRN Max 4/day    Or   • LORazepam (ATIVAN) injection 2 mg  2 mg Intramuscular Q6H PRN   • hydrOXYzine HCL (ATARAX) tablet 25 mg  25 mg Oral Q6H PRN Max 4/day   • ibuprofen (MOTRIN) tablet 400 mg  400 mg Oral Q4H PRN   • ibuprofen (MOTRIN) tablet 600 mg  600 mg Oral Q6H PRN   • ibuprofen (MOTRIN) tablet 800 mg  800 mg Oral Q8H PRN   • levothyroxine tablet 75 mcg  75 mcg Oral Early Morning   • losartan (COZAAR) tablet 75 mg  75 mg Oral Daily   • mirtazapine (REMERON) tablet 7.5 mg  7.5 mg Oral HS   • polyethylene glycol (MIRALAX) packet 17 g  17 g Oral Daily PRN   • senna-docusate sodium (SENOKOT S) 8.6-50 mg per tablet 1 tablet  1 tablet Oral Daily PRN   • traZODone (DESYREL) tablet 50 mg  50 mg Oral HS PRN       Subjective: Patient was seen for continuation of care. Chart was reviewed and discussed with treatment team.     No acute behavioral events over the past 24 hours. Today, patient was seen and examined at bedside for continuation of care. Today, patient states she had a "rough day". She still feels depressed, but not suicidal. She has a headache and isn't sure if its attributable to the Remeron, or not. She is otherwise w/o complaints and we discussed increasing Remeron over the weekend. Patient denied other adverse effects to their psychiatric medication regimen. Patient denied other new or worsening psychiatric symptoms/complaints at this time. Discussed the importance of continuing to take medications as prescribed, as well as the importance of continuing to attend groups on the unit.      Psychiatric Review of Systems:  Medication adverse effects: ?HA  Sleep: unchanged  Appetite: unchanged  Behavior over the past 24 hours: as per above    Vitals:  Vitals:    09/08/23 0907   BP: 132/96   Pulse: 94   Resp:    Temp:    SpO2:        Laboratory results:    I have personally reviewed all pertinent laboratory/tests results  Recent Results (from the past 48 hour(s))   CBC and differential    Collection Time: 09/06/23  1:21 PM   Result Value Ref Range    WBC 7.21 4.31 - 10.16 Thousand/uL    RBC 4.69 3.81 - 5.12 Million/uL    Hemoglobin 14.0 11.5 - 15.4 g/dL    Hematocrit 42.2 34.8 - 46.1 %    MCV 90 82 - 98 fL    MCH 29.9 26.8 - 34.3 pg    MCHC 33.2 31.4 - 37.4 g/dL    RDW 12.4 11.6 - 15.1 %    MPV 9.0 8.9 - 12.7 fL    Platelets 857 614 - 291 Thousands/uL    nRBC 0 /100 WBCs    Neutrophils Relative 57 43 - 75 %    Immat GRANS % 0 0 - 2 %    Lymphocytes Relative 29 14 - 44 %    Monocytes Relative 7 4 - 12 %    Eosinophils Relative 6 0 - 6 %    Basophils Relative 1 0 - 1 %    Neutrophils Absolute 4.15 1.85 - 7.62 Thousands/µL    Immature Grans Absolute 0.03 0.00 - 0.20 Thousand/uL    Lymphocytes Absolute 2.10 0.60 - 4.47 Thousands/µL    Monocytes Absolute 0.47 0.17 - 1.22 Thousand/µL    Eosinophils Absolute 0.40 0.00 - 0.61 Thousand/µL    Basophils Absolute 0.06 0.00 - 0.10 Thousands/µL   Comprehensive metabolic panel    Collection Time: 09/06/23  1:21 PM   Result Value Ref Range    Sodium 139 135 - 147 mmol/L    Potassium 3.7 3.5 - 5.3 mmol/L    Chloride 104 96 - 108 mmol/L    CO2 28 21 - 32 mmol/L    ANION GAP 7 mmol/L    BUN 20 5 - 25 mg/dL    Creatinine 0.77 0.60 - 1.30 mg/dL    Glucose 100 65 - 140 mg/dL    Calcium 9.3 8.4 - 10.2 mg/dL    AST 16 13 - 39 U/L    ALT 13 7 - 52 U/L    Alkaline Phosphatase 89 34 - 104 U/L    Total Protein 7.8 6.4 - 8.4 g/dL    Albumin 4.6 3.5 - 5.0 g/dL    Total Bilirubin 0.49 0.20 - 1.00 mg/dL    eGFR 89 ml/min/1.73sq m   TSH, 3rd generation with Free T4 reflex    Collection Time: 09/06/23  1:21 PM   Result Value Ref Range    TSH 3RD GENERATON 6.577 (H) 0.450 - 4.500 uIU/mL   Ethanol    Collection Time: 09/06/23  1:21 PM   Result Value Ref Range    Ethanol Lvl <10 <10 mg/dL   FLU/RSV/COVID - if FLU/RSV clinically relevant    Collection Time: 09/06/23  1:21 PM    Specimen: Nose; Nares   Result Value Ref Range    SARS-CoV-2 Negative Negative    INFLUENZA A PCR Negative Negative    INFLUENZA B PCR Negative Negative    RSV PCR Negative Negative   T4, free    Collection Time: 09/06/23  1:21 PM   Result Value Ref Range    Free T4 0.83 0.61 - 1.12 ng/dL   UA w Reflex to Microscopic w Reflex to Culture    Collection Time: 09/06/23  1:25 PM    Specimen: Urine, Clean Catch   Result Value Ref Range    Color, UA Yellow     Clarity, UA Clear     Specific Gravity, UA 1.010 1.003 - 1.030    pH, UA 5.5 4.5, 5.0, 5.5, 6.0, 6.5, 7.0, 7.5, 8.0    Leukocytes, UA Trace (A) Negative    Nitrite, UA Negative Negative    Protein, UA Negative Negative mg/dl    Glucose, UA Negative Negative mg/dl    Ketones, UA Negative Negative mg/dl    Urobilinogen, UA 1.0 0.2, 1.0 E.U./dl E.U./dl    Bilirubin, UA Negative Negative    Occult Blood, UA Negative Negative   Rapid drug screen, urine    Collection Time: 09/06/23  1:25 PM   Result Value Ref Range    Amph/Meth UR Negative Negative    Barbiturate Ur Negative Negative    Benzodiazepine Urine Negative Negative    Cocaine Urine Negative Negative    Methadone Urine Negative Negative    Opiate Urine Negative Negative    PCP Ur Negative Negative    THC Urine Negative Negative    Oxycodone Urine Negative Negative   Urine Microscopic    Collection Time: 09/06/23  1:25 PM   Result Value Ref Range    RBC, UA None Seen None Seen, 0-1, 1-2, 2-4, 0-5 /hpf    WBC, UA 2-4 None Seen, 0-1, 1-2, 0-5, 2-4 /hpf    Epithelial Cells Moderate (A) None Seen, Occasional /hpf    Bacteria, UA Occasional None Seen, Occasional /hpf   POCT pregnancy, urine    Collection Time: 09/06/23  1:29 PM   Result Value Ref Range    EXT Preg Test, Ur Negative     Control Valid    Comprehensive metabolic panel    Collection Time: 09/07/23  5:46 AM   Result Value Ref Range    Sodium 141 135 - 147 mmol/L    Potassium 4.2 3.5 - 5.3 mmol/L    Chloride 104 96 - 108 mmol/L    CO2 31 21 - 32 mmol/L    ANION GAP 6 mmol/L    BUN 22 5 - 25 mg/dL    Creatinine 0.69 0.60 - 1.30 mg/dL    Glucose 89 65 - 140 mg/dL    Glucose, Fasting 89 65 - 99 mg/dL    Calcium 9.6 8.4 - 10.2 mg/dL    AST 15 13 - 39 U/L    ALT 12 7 - 52 U/L    Alkaline Phosphatase 75 34 - 104 U/L    Total Protein 7.6 6.4 - 8.4 g/dL    Albumin 4.3 3.5 - 5.0 g/dL    Total Bilirubin 0.52 0.20 - 1.00 mg/dL    eGFR 101 ml/min/1.73sq m   Vitamin D 25 hydroxy    Collection Time: 09/07/23  5:46 AM   Result Value Ref Range    Vit D, 25-Hydroxy 29.8 (L) 30.0 - 100.0 ng/mL   Lipid panel    Collection Time: 09/07/23  5:46 AM   Result Value Ref Range    Cholesterol 158 See Comment mg/dL    Triglycerides 142 See Comment mg/dL    HDL, Direct 53 >=50 mg/dL    LDL Calculated 77 0 - 100 mg/dL    Non-HDL-Chol (CHOL-HDL) 105 mg/dl   CBC and differential    Collection Time: 09/07/23  5:47 AM   Result Value Ref Range    WBC 7.21 4.31 - 10.16 Thousand/uL    RBC 4.49 3.81 - 5.12 Million/uL    Hemoglobin 13.5 11.5 - 15.4 g/dL    Hematocrit 40.9 34.8 - 46.1 %    MCV 91 82 - 98 fL    MCH 30.1 26.8 - 34.3 pg    MCHC 33.0 31.4 - 37.4 g/dL    RDW 12.4 11.6 - 15.1 %    MPV 9.5 8.9 - 12.7 fL    Platelets 502 100 - 264 Thousands/uL    nRBC 0 /100 WBCs    Neutrophils Relative 58 43 - 75 %    Immat GRANS % 0 0 - 2 %    Lymphocytes Relative 29 14 - 44 %    Monocytes Relative 7 4 - 12 %    Eosinophils Relative 5 0 - 6 %    Basophils Relative 1 0 - 1 %    Neutrophils Absolute 4.23 1.85 - 7.62 Thousands/µL    Immature Grans Absolute 0.03 0.00 - 0.20 Thousand/uL    Lymphocytes Absolute 2.09 0.60 - 4.47 Thousands/µL    Monocytes Absolute 0.48 0.17 - 1.22 Thousand/µL    Eosinophils Absolute 0.33 0.00 - 0.61 Thousand/µL    Basophils Absolute 0.05 0.00 - 0.10 Thousands/µL   Vitamin B12    Collection Time: 09/07/23  5:47 AM   Result Value Ref Range    Vitamin B-12 340 180 - 914 pg/mL   Folate    Collection Time: 09/07/23  5:47 AM   Result Value Ref Range    Folate 9.0 >5.9 ng/mL   Urinalysis    Collection Time: 09/07/23  6:32 AM   Result Value Ref Range    Color, UA Yellow     Clarity, UA Clear     Specific Gravity, UA >=1.030 1.005 - 1.030    pH, UA 5.5 4.5, 5.0, 5.5, 6.0, 6.5, 7.0, 7.5, 8.0    Leukocytes, UA Negative Negative    Nitrite, UA Negative Negative Protein, UA Trace (A) Negative mg/dl    Glucose, UA Negative Negative mg/dl    Ketones, UA Negative Negative mg/dl    Urobilinogen, UA <2.0 <2.0 mg/dl mg/dl    Bilirubin, UA Negative Negative    Occult Blood, UA Negative Negative    RBC, UA None Seen None Seen, 2-4 /hpf    WBC, UA None Seen None Seen, 2-4, 5-60 /hpf    Epithelial Cells None Seen None Seen, Occasional /hpf    Bacteria, UA Occasional None Seen, Occasional /hpf    Ca Oxalate Cynthia, UA Moderate (A) None Seen /hpf        Current Medications:  Current medications as per above. All medications have been reviewed. Risks, benefits, alternatives, and possible side effects of patient's psychiatric medications were discussed with patient. Mental Status Evaluation:  Appearance: casually dressed, consistent with stated age  Motor: +psychomotor retardation, no gait abnormalities  Behavior: cooperative, answers questions appropriately  Speech: soft, normal rhythm  Mood: "I had a rough day"  Affect: constricted, depressed-appearing  Thought Process: linear and goal-oriented  Thought Content: denies auditory hallucinations, denies visual hallucinations, denies delusions  Risk Potential: denies suicidal ideation, plan, or intent. Denies homicidal ideation  Sensorium: Oriented to person, place, time, and situation  Cognition: cognitive ability appears intact but was not quantitatively tested  Consciousness: alert and awake  Attention: intact, able to focus without difficulty  Insight: fair  Judgement: limited        Progress Toward Goals & Illness Status: Patient is not at goal. They are not yet ready for discharge. The patient's condition currently requires active psychopharmacological medication management, interdisciplinary coordination with case management, and the utilization of adjunctive milieu and group therapy to augment psychopharmacological efficacy.  The patient's risk of morbidity, and progression or decompensation of psychiatric disease, is higher without this current treatment. This note has been constructed using a voice recognition system. There may be translation, syntax, or grammatical errors. If you have any questions, please contact the dictating provider.

## 2023-09-08 NOTE — NURSING NOTE
Patient was resting in bed with eyes closed, no episodes of restlessness observed. Safety rounds maintained throughout the night.

## 2023-09-09 PROCEDURE — 99232 SBSQ HOSP IP/OBS MODERATE 35: CPT | Performed by: PSYCHIATRY & NEUROLOGY

## 2023-09-09 RX ADMIN — ATORVASTATIN CALCIUM 40 MG: 40 TABLET, FILM COATED ORAL at 17:06

## 2023-09-09 RX ADMIN — LEVOTHYROXINE SODIUM 75 MCG: 75 TABLET ORAL at 06:11

## 2023-09-09 RX ADMIN — LOSARTAN POTASSIUM 75 MG: 50 TABLET, FILM COATED ORAL at 09:01

## 2023-09-09 RX ADMIN — MIRTAZAPINE 7.5 MG: 15 TABLET, FILM COATED ORAL at 21:21

## 2023-09-09 NOTE — PROGRESS NOTES
Progress Note - [address] [facility] 47 y.o. female MRN: [id number]  Unit/Bed#: [facility] 207-01 Encounter: [id number]    Assessment/Plan   Principal Problem:    Recurrent major depressive disorder ([address] [facility])  Active Problems:    Anxiety    Medical clearance for psychiatric admission    Hypothyroidism    Hypertension    Hyperlipidemia    • Continue medications as noted below. • Continue to promote patient participation in group therapy, milieu therapy, occupational therapy  • Continue medical management by primary team.  • Discharge disposition:  pending    Subjective: The patient was evaluated this morning for continuity of care and no acute distress noted throughout the evaluation. Over the past 24 hours staff noted that patient has reporting feeling depressed and withdrawn at times, though social with other patients. Patient has been medication adherent. .    Today on evaluation, Saige Cotton reports that she still feels depressed, and expresses passive thoughts of death, though denies active suicidal ideation or a plan. She states that she feels somewhat tired and sleepy due to difficulty sleeping, otherwise denies any acute issues. Saige Cotton denies auditory, visual hallucinations. Feels safe on the unit.      Psychiatric Review of Systems:  Behavior over the last 24 hours:  unchanged  Sleep: difficulty falling asleep  Appetite: improving  Medication side effects: No   ROS: reports ankle pain due to bone spurs, all other systems are negative    Current Medications:  Current Facility-Administered Medications   Medication Dose Route Frequency Provider Last Rate   • aluminum-magnesium hydroxide-simethicone  30 mL Oral Q4H PRN Cl Dick MD     • atorvastatin  40 mg Oral Daily With [place], PA-C     • haloperidol lactate  2.5 mg Intramuscular Q4H PRN Max 4/day Cl Dick MD      And   • LORazepam  1 mg Intramuscular Q4H PRN Max 4/day Cl Dick MD      And   • benztropine  0.5 mg Intramuscular Q4H PRN Max 4/day Malia Casper MD     • haloperidol lactate  5 mg Intramuscular Q4H PRN Max 4/day Malia Casper MD      And   • LORazepam  2 mg Intramuscular Q4H PRN Max 4/day Malia Casper MD      And   • benztropine  1 mg Intramuscular Q4H PRN Max 4/day Malia Casper MD     • benztropine  1 mg Oral Q4H PRN Max 6/day Malia Casper MD     • bisacodyl  10 mg Rectal Daily PRN Malia Casper MD     • hydrOXYzine HCL  50 mg Oral Q6H PRN Max 4/day Malia Casper MD      Or   • diphenhydrAMINE  50 mg Intramuscular Q6H PRN Malia Casper MD     • haloperidol  1 mg Oral Q6H PRN Malia Casper MD     • haloperidol  2.5 mg Oral Q4H PRN Max 4/day Malia Casper MD     • haloperidol  5 mg Oral Q4H PRN Max 4/day Malia Casper MD     • hydrOXYzine HCL  100 mg Oral Q6H PRN Max 4/day Malia Casper MD      Or   • LORazepam  2 mg Intramuscular Q6H PRN Malia Casper MD     • hydrOXYzine HCL  25 mg Oral Q6H PRN Max 4/day Malia Casper MD     • ibuprofen  400 mg Oral Q4H PRN Malia Casper MD     • ibuprofen  600 mg Oral Q6H PRN Malia Casper MD     • ibuprofen  800 mg Oral Q8H PRN Malia Casper MD     • levothyroxine  75 mcg Oral Early Morning Malia Casper MD     • losartan  75 mg Oral Daily Malia Casper MD     • mirtazapine  7.5 mg Oral HS Marinsteven Vanessa DO     • polyethylene glycol  17 g Oral Daily PRN Malia Casper MD     • senna-docusate sodium  1 tablet Oral Daily PRN Malia Casper MD     • traZODone  50 mg Oral HS PRN Malia Casper MD         Behavioral Health Medications: all current active meds have been reviewed and continue current psychiatric medications. Vital signs in last 24 hours:  Temp:  [97.5 °F (36.4 °C)-98.5 °F (36.9 °C)] 98.5 °F (36.9 °C)  HR:  [57-94] 77  Resp:  [16-17] 17  BP: (101132)/69-99 113/99    Laboratory results:    I have personally reviewed all pertinent laboratory/tests results.   Most Recent Labs:   Lab Results   Component Value Date    WBC 7.21 09/07/2023    RBC 4.49 09/07/2023    HGB 13.5 09/07/2023    HCT 40.9 09/07/2023     09/07/2023    RDW 12.4 09/07/2023    NEUTROABS 4.23 09/07/2023    SODIUM 141 09/07/2023    K 4.2 09/07/2023     09/07/2023    CO2 31 09/07/2023    BUN 22 09/07/2023    CREATININE 0.69 09/07/2023    GLUC 89 09/07/2023    GLUF 89 09/07/2023    CALCIUM 9.6 09/07/2023    AST 15 09/07/2023    ALT 12 09/07/2023    ALKPHOS 75 09/07/2023    TP 7.6 09/07/2023    ALB 4.3 09/07/2023    TBILI 0.52 09/07/2023    CHOLESTEROL 158 09/07/2023    HDL 53 09/07/2023    TRIG 142 09/07/2023    LDLCALC 77 09/07/2023    NONHDLC 105 09/07/2023    [id number] 6.577 (H) 09/06/2023    FREET4 0.83 09/06/2023    PREGSERUM Negative 10/18/2020    HCGQUANT 1 07/08/2023    HGBA1C 5.6 05/05/2021     05/05/2021         Mental Status Evaluation:    Appearance:  age appropriate, casually dressed   Behavior:  pleasant, cooperative, guarded   Speech:  soft   Mood:  depressed   Affect:  constricted   Thought Process:  goal directed   Associations: intact associations   Thought Content:  no overt delusions   Perceptual Disturbances: no auditory hallucinations, no visual hallucinations, does not appear responding to internal stimuli   Risk Potential: Suicidal ideation - Yes, passive death wish  Homicidal ideation - None  Potential for aggression - No   Sensorium:  oriented to person, place and time/date   Memory:  recent and remote memory grossly intact   Consciousness:  alert and awake   Attention/Concentration: attention span and concentration appear shorter than expected for age   Insight:  limited   Judgment: limited   Gait/Station: normal gait/station   Motor Activity: no abnormal movements     Progress Toward Goals: progressing    Recommended Treatment:   See above for assessment and plan.      Risks, benefits and possible side effects of Medications:   Risks, benefits, and possible side effects of medications explained to patient and patient verbalizes understanding. Treatment discussed with nursing staff. This note has been constructed using a voice recognition system. There may be translation, syntax, or grammatical errors. If you have any questions, please contact the dictating provider.     Kylee Richards MD

## 2023-09-09 NOTE — TREATMENT TEAM
09/09/23 1000   Team Meeting   Meeting Type Daily Rounds   Team Members Present   Team Members Present Physician;Nurse   Physician Team Member Glenys/Cristine   Nursing Team Member [place]/Nishant   Patient/Family Present   Patient Present No   Patient's Family Present No       AM rounds- denies SI/HI, reports elevated anxiety after receiving new roommate. Woke up however slept majority of the night.

## 2023-09-09 NOTE — NURSING NOTE
Patient appeared to be resting throughout the night, with one brief period of restlessness observed, approximately 15 minutes, after which patient returned to rest. Observational rounds maintained for promotion of patient safety.

## 2023-09-09 NOTE — NURSING NOTE
Pt denies SI/HI, AVH. Irritable edge and remains focused on having a roommate, stating she is feeling that she may be ready for discharge soon and wanting to speak to the doctor about this. Pt remains social with select peers, on phone often. Denies questions regarding treatment.

## 2023-09-09 NOTE — NURSING NOTE
Patient approached this writer to report, "I just wanted to let you know that I've been nice to my room-mate and she's been nice to me." Masood Richardson reports feeling safe in room at this time, again encouraged to report any issues and/or concerns with staff.

## 2023-09-09 NOTE — NURSING NOTE
Tereza Valverde is somewhat irritable upon approach, visualized sitting in the corner of the community room with arms crossed. Patient reports, "I'm not happy. I called my mom and my sister and explained to them that I'm scared. I shouldn't have to feel scared here, ever." This writer asked patient to elaborate. Tereza Valverde reports, "My room-mate is loud and was cursing. I don't feel safe here like that." Patient was reassured that staff is constantly monitoring for promotion of safety on the unit. Tereza Valverde was encouraged to seek staff with any issues and/or concerns, verbalized understanding. Patient encouraged to seek staff if feeling unsafe at any time, verbalized agreement and thanked this writer. Tereza Valverde denies current SI/HI/AH/VH.

## 2023-09-10 PROCEDURE — 99232 SBSQ HOSP IP/OBS MODERATE 35: CPT | Performed by: PSYCHIATRY & NEUROLOGY

## 2023-09-10 RX ORDER — TRAZODONE HYDROCHLORIDE 50 MG/1
50 TABLET ORAL
Status: DISCONTINUED | OUTPATIENT
Start: 2023-09-10 | End: 2023-09-11

## 2023-09-10 RX ORDER — ESCITALOPRAM OXALATE 5 MG/1
5 TABLET ORAL DAILY
Status: DISCONTINUED | OUTPATIENT
Start: 2023-09-10 | End: 2023-09-11

## 2023-09-10 RX ORDER — MIRTAZAPINE 15 MG/1
15 TABLET, FILM COATED ORAL
Status: DISCONTINUED | OUTPATIENT
Start: 2023-09-10 | End: 2023-09-10

## 2023-09-10 RX ADMIN — LEVOTHYROXINE SODIUM 75 MCG: 75 TABLET ORAL at 06:37

## 2023-09-10 RX ADMIN — ESCITALOPRAM 5 MG: 5 TABLET, FILM COATED ORAL at 11:07

## 2023-09-10 RX ADMIN — ATORVASTATIN CALCIUM 40 MG: 40 TABLET, FILM COATED ORAL at 16:58

## 2023-09-10 RX ADMIN — LOSARTAN POTASSIUM 75 MG: 50 TABLET, FILM COATED ORAL at 09:06

## 2023-09-10 RX ADMIN — TRAZODONE HYDROCHLORIDE 50 MG: 50 TABLET ORAL at 21:03

## 2023-09-10 NOTE — NURSING NOTE
Denies SI/HI, pt remains with irritable edge at times and making negative statements regarding unit rules/schedule. Pt reports moderate anxiety and difficulty sleeping overnight. Pt states she does not want to take trazodone as this has not been helpful. Pt denies any further concerns regarding scheduled medications.

## 2023-09-10 NOTE — PROGRESS NOTES
Progress Note - [address]  47 y.o. female MRN: [id number]  Unit/Bed#: U 207-01 Encounter: [id number]    Assessment/Plan   Principal Problem:    Recurrent major depressive disorder ([address] [facility])  Active Problems:    Anxiety    Medical clearance for psychiatric admission    Hypothyroidism    Hypertension    Hyperlipidemia    • Discontinue Remeron due to reported SE and patient choice  • Start Lexapro 5 mg daily for mood  • Start Trazodone 50 mg qhs for insomnia  • Continue other medications as noted below. • Continue to promote patient participation in group therapy, milieu therapy, occupational therapy  • Continue medical management by primary team.  • Discharge disposition:  Pending    Subjective: The patient was evaluated this morning for continuity of care and no acute distress noted throughout the evaluation. Over the past 24 hours staff noted that patient has been exhibiting elevated anxiety and irritability at times. Noted to have slept throughout the night on observation by staff. Today on evaluation, Oumou Johansen states that she has been experiencing ongoing depression and anxiety symptoms, and passive thoughts of death, stating "do not want to wake up. What is there to live for?"  Denies active suicidal ideation at this time. Denies suicidal plans. Her major concern at this time is her Remeron is making her dizzy as well as taking her feel excessively tired, and "cannot think ". She believes it started after initiation of Remeron. She would like to discontinue it, and switch to another medication. After thorough discussion, patient agreed to try Lexapro for depression and anxiety, and trazodone at nighttime for insomnia. Patient denies auditory, visual hallucinations.     Psychiatric Review of Systems:  Behavior over the last 24 hours:  unchanged  Sleep: difficulty falling asleep  Appetite: normal  Medication side effects: No   ROS: no complaints, all other systems are negative    Current Medications:  Current Facility-Administered Medications   Medication Dose Route Frequency Provider Last Rate   • aluminum-magnesium hydroxide-simethicone  30 mL Oral Q4H PRN Bebe Benitez MD     • atorvastatin  40 mg Oral Daily With [employer], REINIER     • haloperidol lactate  2.5 mg Intramuscular Q4H PRN Max 4/day Bebe Benitez MD      And   • LORazepam  1 mg Intramuscular Q4H PRN Max 4/day Bebe Benitez MD      And   • benztropine  0.5 mg Intramuscular Q4H PRN Max 4/day Bebe Benitez MD     • haloperidol lactate  5 mg Intramuscular Q4H PRN Max 4/day Bebe Benitez MD      And   • LORazepam  2 mg Intramuscular Q4H PRN Max 4/day Bebe Benitez MD      And   • benztropine  1 mg Intramuscular Q4H PRN Max 4/day Bebe Benitez MD     • benztropine  1 mg Oral Q4H PRN Max 6/day Bebe Benitez MD     • bisacodyl  10 mg Rectal Daily PRN Bebe Benitez MD     • hydrOXYzine HCL  50 mg Oral Q6H PRN Max 4/day Bebe Benitez MD      Or   • diphenhydrAMINE  50 mg Intramuscular Q6H PRN Bebe Benitez MD     • escitalopram  5 mg Oral Daily Gracie Veronica MD     • haloperidol  1 mg Oral Q6H PRN Bebe Benitez MD     • haloperidol  2.5 mg Oral Q4H PRN Max 4/day Bebe Benitez MD     • haloperidol  5 mg Oral Q4H PRN Max 4/day Bebe Benitez MD     • hydrOXYzine HCL  100 mg Oral Q6H PRN Max 4/day Bebe Benitez MD      Or   • LORazepam  2 mg Intramuscular Q6H PRN Bebe Benitez MD     • hydrOXYzine HCL  25 mg Oral Q6H PRN Max 4/day Bebe Benitez MD     • ibuprofen  400 mg Oral Q4H PRN Bebe Benitez MD     • ibuprofen  600 mg Oral Q6H PRN Bebe Benitez MD     • ibuprofen  800 mg Oral Q8H PRN Bebe Benitez MD     • levothyroxine  75 mcg Oral Early Morning Bebe Benitez MD     • losartan  75 mg Oral Daily Bebe Benitez MD     • polyethylene glycol  17 g Oral Daily PRN Bebe Benitez MD     • senna-docusate sodium  1 tablet Oral Daily PRN Trista Rodrigues MD     • traZODone  50 mg Oral HS PRKEYSHAWN Rodrigues MD     • traZODone  50 mg Oral HS Gracie Veronica MD         Behavioral Health Medications: all current active meds have been reviewed and continue current psychiatric medications. Vital signs in last 24 hours:  Temp:  [97.5 °F (36.4 °C)-99.3 °F (37.4 °C)] 99.3 °F (37.4 °C)  HR:  [62-67] 62  Resp:  [16-18] 16  BP: (120-127)/(81-86) 120/81    Laboratory results:    I have personally reviewed all pertinent laboratory/tests results.   Most Recent Labs:   Lab Results   Component Value Date    WBC 7.21 09/07/2023    RBC 4.49 09/07/2023    HGB 13.5 09/07/2023    HCT 40.9 09/07/2023     09/07/2023    RDW 12.4 09/07/2023    NEUTROABS 4.23 09/07/2023    SODIUM 141 09/07/2023    K 4.2 09/07/2023     09/07/2023    CO2 31 09/07/2023    BUN 22 09/07/2023    CREATININE 0.69 09/07/2023    GLUC 89 09/07/2023    GLUF 89 09/07/2023    CALCIUM 9.6 09/07/2023    AST 15 09/07/2023    ALT 12 09/07/2023    ALKPHOS 75 09/07/2023    TP 7.6 09/07/2023    ALB 4.3 09/07/2023    TBILI 0.52 09/07/2023    CHOLESTEROL 158 09/07/2023    HDL 53 09/07/2023    TRIG 142 09/07/2023    LDLCALC 77 09/07/2023    NONHDLC 105 09/07/2023    YHJ1HEQCRALV 6.577 (H) 09/06/2023    FREET4 0.83 09/06/2023    PREGSERUM Negative 10/18/2020    HCGQUANT 1 07/08/2023    HGBA1C 5.6 05/05/2021     05/05/2021         Mental Status Evaluation:    Appearance:  age appropriate, casually dressed   Behavior:  cooperative, guarded   Speech:  normal rate and volume   Mood:  depressed   Affect:  constricted   Thought Process:  goal directed   Associations: intact associations   Thought Content:  no overt delusions   Perceptual Disturbances: no auditory hallucinations, no visual hallucinations, does not appear responding to internal stimuli   Risk Potential: Suicidal ideation - passive thoughts of death   Homicidal ideation - None  Potential for aggression - No   Sensorium: oriented to person, place and time/date   Memory:  recent and remote memory grossly intact   Consciousness:  alert and awake   Attention/Concentration: attention span and concentration appear shorter than expected for age   Insight:  limited   Judgment: limited   Gait/Station: normal gait/station   Motor Activity: no abnormal movements     Progress Toward Goals: slow progress    Recommended Treatment:   See above for assessment and plan. Risks, benefits and possible side effects of Medications:   Risks, benefits, and possible side effects of medications explained to patient and patient verbalizes understanding. Treatment discussed with nursing staff. This note has been constructed using a voice recognition system. There may be translation, syntax, or grammatical errors. If you have any questions, please contact the dictating provider.     Radha Ruff MD

## 2023-09-10 NOTE — NURSING NOTE
Irritable edge c/o laundry issues denies SI, AVH  seclusive to room for long intervals, will continue to monitor  mood

## 2023-09-10 NOTE — TREATMENT TEAM
09/10/23 0700   Team Meeting   Meeting Type Daily Rounds   Team Members Present   Team Members Present Physician;Nurse   Physician Team Member Glenys/Mili   Nursing Team Member [place]/Nishant   Patient/Family Present   Patient Present No   Patient's Family Present No       AM rounds- denies SI/HI, reports elevated anxiety and irritability mainly related to having roommate. Needing redirection due to speaking about peers when on phone.

## 2023-09-11 PROCEDURE — 99233 SBSQ HOSP IP/OBS HIGH 50: CPT | Performed by: PSYCHIATRY & NEUROLOGY

## 2023-09-11 RX ORDER — ESCITALOPRAM OXALATE 10 MG/1
10 TABLET ORAL DAILY
Status: DISCONTINUED | OUTPATIENT
Start: 2023-09-12 | End: 2023-09-14 | Stop reason: HOSPADM

## 2023-09-11 RX ADMIN — LOSARTAN POTASSIUM 75 MG: 50 TABLET, FILM COATED ORAL at 09:18

## 2023-09-11 RX ADMIN — LEVOTHYROXINE SODIUM 75 MCG: 75 TABLET ORAL at 06:14

## 2023-09-11 RX ADMIN — ATORVASTATIN CALCIUM 40 MG: 40 TABLET, FILM COATED ORAL at 16:57

## 2023-09-11 RX ADMIN — ESCITALOPRAM 5 MG: 5 TABLET, FILM COATED ORAL at 09:18

## 2023-09-11 NOTE — PLAN OF CARE
Problem: SELF HARM/SUICIDALITY  Goal: Will have no self-injury during hospital stay  Description: INTERVENTIONS:  - Q 15 MINUTES: Routine safety checks  - Q WAKING SHIFT & PRN: Assess risk to determine if routine checks are adequate to maintain patient safety  - Encourage patient to participate actively in care by formulating a plan to combat response to suicidal ideation, identify supports and resources  Outcome: Progressing     Problem: DEPRESSION  Goal: Will be euthymic at discharge  Description: INTERVENTIONS:  - Administer medication as ordered  - Provide emotional support via 1:1 interaction with staff  - Encourage involvement in milieu/groups/activities  - Monitor for social isolation  Outcome: Progressing     Problem: ANXIETY  Goal: Will report anxiety at manageable levels  Description: INTERVENTIONS:  - Administer medication as ordered  - Teach and encourage coping skills  - Provide emotional support  - Assess patient/family for anxiety and ability to cope  Outcome: Progressing  Goal: By discharge: Patient will verbalize 2 strategies to deal with anxiety  Description: Interventions:  - Identify any obvious source/trigger to anxiety  - Staff will assist patient in applying identified coping technique/skills  - Encourage attendance of scheduled groups and activities  Outcome: Progressing     Problem: SLEEP DISTURBANCE  Goal: Will exhibit normal sleeping pattern  Description: Interventions:  -  Assess the patients sleep pattern, noting recent changes  - Administer medication as ordered  - Decrease environmental stimuli, including noise, as appropriate during the night  - Encourage the patient to actively participate in unit groups and or exercise during the day to enhance ability to achieve adequate sleep at night  - Assess the patient, in the morning, encouraging a description of sleep experience  Outcome: Progressing     Problem: SELF CARE DEFICIT  Goal: Return ADL status to a safe level of function  Description: INTERVENTIONS:  - Administer medication as ordered  - Assess ADL deficits and provide assistive devices as needed  - Obtain PT/OT consults as needed  - Assist and instruct patient to increase activity and self care as tolerated  Outcome: Progressing     Problem: Ineffective Coping  Goal: Participates in unit activities  Description: Interventions:  - Provide therapeutic environment   - Provide required programming   - Redirect inappropriate behaviors   Outcome: Progressing

## 2023-09-11 NOTE — PROGRESS NOTES
09/11/23 1100   Activity/Group Checklist   Group Life Skills  (Goals and motivation)   Attendance Attended   Attendance Duration (min) 31-45   Interactions Interacted appropriately   Affect/Mood Appropriate   Goals Achieved Able to listen to others; Able to engage in interactions; Able to reflect/comment on own behavior; Identified feelings  (Checked in about current feelings of wellness. Discussed skills which can help patient with mood.  Considered goals and interacted with peers about accomplishing chosen goals.)

## 2023-09-11 NOTE — PROGRESS NOTES
Progress Note - Behavioral Health   Jodi Dee 47 y.o. female MRN: [id number]  Unit/Bed#: [facility] 207-01 Encounter: [id number]    Assessment:  Principal Problem:    Recurrent major depressive disorder ([address])  Active Problems:    Anxiety    Medical clearance for psychiatric admission    Hypothyroidism    Hypertension    Hyperlipidemia      Plan:  --DC Trazodone; plan to increase Lexapro tomorrow  --Continue with psychiatric hospitalization  --Continue with individual, group, and milieu therapy  --Continue the following medications:  Current Facility-Administered Medications   Medication Dose Route Frequency   • aluminum-magnesium hydroxide-simethicone (MAALOX) oral suspension 30 mL  30 mL Oral Q4H PRN   • atorvastatin (LIPITOR) tablet 40 mg  40 mg Oral Daily With Dinner   • haloperidol lactate (HALDOL) injection 2.5 mg  2.5 mg Intramuscular Q4H PRN Max 4/day    And   • LORazepam (ATIVAN) injection 1 mg  1 mg Intramuscular Q4H PRN Max 4/day    And   • benztropine (COGENTIN) injection 0.5 mg  0.5 mg Intramuscular Q4H PRN Max 4/day   • haloperidol lactate (HALDOL) injection 5 mg  5 mg Intramuscular Q4H PRN Max 4/day    And   • LORazepam (ATIVAN) injection 2 mg  2 mg Intramuscular Q4H PRN Max 4/day    And   • benztropine (COGENTIN) injection 1 mg  1 mg Intramuscular Q4H PRN Max 4/day   • benztropine (COGENTIN) tablet 1 mg  1 mg Oral Q4H PRN Max 6/day   • bisacodyl (DULCOLAX) rectal suppository 10 mg  10 mg Rectal Daily PRN   • hydrOXYzine HCL (ATARAX) tablet 50 mg  50 mg Oral Q6H PRN Max 4/day    Or   • diphenhydrAMINE (BENADRYL) injection 50 mg  50 mg Intramuscular Q6H PRN   • escitalopram (LEXAPRO) tablet 5 mg  5 mg Oral Daily   • haloperidol (HALDOL) tablet 1 mg  1 mg Oral Q6H PRN   • haloperidol (HALDOL) tablet 2.5 mg  2.5 mg Oral Q4H PRN Max 4/day   • haloperidol (HALDOL) tablet 5 mg  5 mg Oral Q4H PRN Max 4/day   • hydrOXYzine HCL (ATARAX) tablet 100 mg  100 mg Oral Q6H PRN Max 4/day    Or   • LORazepam (ATIVAN) injection 2 mg  2 mg Intramuscular Q6H PRN   • hydrOXYzine HCL (ATARAX) tablet 25 mg  25 mg Oral Q6H PRN Max 4/day   • ibuprofen (MOTRIN) tablet 400 mg  400 mg Oral Q4H PRN   • ibuprofen (MOTRIN) tablet 600 mg  600 mg Oral Q6H PRN   • ibuprofen (MOTRIN) tablet 800 mg  800 mg Oral Q8H PRN   • levothyroxine tablet 75 mcg  75 mcg Oral Early Morning   • losartan (COZAAR) tablet 75 mg  75 mg Oral Daily   • polyethylene glycol (MIRALAX) packet 17 g  17 g Oral Daily PRN   • senna-docusate sodium (SENOKOT S) 8.6-50 mg per tablet 1 tablet  1 tablet Oral Daily PRN   • traZODone (DESYREL) tablet 50 mg  50 mg Oral HS PRN       Subjective: Patient was seen for continuation of care. Chart was reviewed and discussed with treatment team.     No acute behavioral events over the past 24 hours. Today, patient was seen and examined at bedside for continuation of care. Patient reports feeling "the same." She is tolerating Lexapro well and prefers it to the Remeron. She states Trazodone doesn't work and wants it discontinued. She is otherwise w/o complaint. She does not feel her symptoms have improved much and is still very depressed. Patient denied adverse effects to their psychiatric medication regimen. Patient denied other new or worsening psychiatric symptoms/complaints at this time. Discussed the importance of continuing to take medications as prescribed, as well as the importance of continuing to attend groups on the unit. Psychiatric Review of Systems:  Medication adverse effects: none  Sleep: unchanged  Appetite: unchanged  Behavior over the past 24 hours: as per above    Vitals:  Vitals:    09/11/23 0805   BP: 116/77   Pulse: 71   Resp: 15   Temp: 97.7 °F (36.5 °C)   SpO2:        Laboratory results:    I have personally reviewed all pertinent laboratory/tests results  No results found for this or any previous visit (from the past 48 hour(s)). Current Medications:  Current medications as per above.  All medications have been reviewed. Risks, benefits, alternatives, and possible side effects of patient's psychiatric medications were discussed with patient. Mental Status Evaluation:  Appearance: casually dressed, consistent with stated age  Motor: +psychomotor retardation, no gait abnormalities  Behavior: cooperative, answers questions appropriately  Speech: soft, normal rhythm  Mood: "the same"  Affect: constricted, depressed-appearing  Thought Process: linear and goal-oriented  Thought Content: denies auditory hallucinations, denies visual hallucinations, denies delusions  Risk Potential: denies suicidal ideation, plan, or intent. Denies homicidal ideation  Sensorium: Oriented to person, place, time, and situation  Cognition: cognitive ability appears intact but was not quantitatively tested  Consciousness: alert and awake  Attention: intact, able to focus without difficulty  Insight: fair  Judgement: limited      Progress Toward Goals & Illness Status: Patient is not at goal. They are not yet ready for discharge. The patient's condition currently requires active psychopharmacological medication management, interdisciplinary coordination with case management, and the utilization of adjunctive milieu and group therapy to augment psychopharmacological efficacy. The patient's risk of morbidity, and progression or decompensation of psychiatric disease, is higher without this current treatment. This note has been constructed using a voice recognition system. There may be translation, syntax, or grammatical errors. If you have any questions, please contact the dictating provider.

## 2023-09-11 NOTE — NURSING NOTE
At [facility] request, this writer approached pt and observed her sitting upright in bed with lights off. Pt appeared tearful and stated "I'm having a bad day and I just wish I wasn't here". Pt then spontaneously elaborated reporting that her  of twenty years passed away eight years ago and that she "never really dealt with it" and wishes that she could trade places with him. Pt reports feeling as though it would have made more sense for her to have passed because "he was a better person than I am." Pt endorses feelings of anxiety when thinking these thoughts but denied prn medications when offered. Pt also reports feeling as though current medication regimen is not helpful and attributes her meds to a period of blurry vision that she reports experiencing earlier in the day - symptoms since resolved. Denies active SI and consents to safety while on unit, however states "if I was home it would be a different story. And if you want to rate it, I'd say an 8". Denies HI/AVH.

## 2023-09-11 NOTE — PROGRESS NOTES
Pt denies SI, irritable at times, entitled. Reported heal pain. No PRNs. DC: TBD      09/11/23 0754   Team Meeting   Meeting Type Daily Rounds   Team Members Present   Team Members Present Physician;Nurse;; Other (Discipline and Name)   Physician Team Member Dr. Keyon Cabrera / Dr. Nirmal Griffin / Aleja Delgado / Esther Singh Team Member Liat Sal / Chi Oseguera Management Team Member Colette Mendoza / Robyn Apley / Soco Ling / Anil Cortes   Other (Discipline and Name) Zuleyma Morse (Art Therapy) / Liz Sharma (Group Facilitator)   Patient/Family Present   Patient Present No   Patient's Family Present No

## 2023-09-11 NOTE — NURSING NOTE
Walked with [place] in the [place] while we spoke. She was pleasant and calm. Reports increased anxiety related to her work environment and finances. She reports losing interest in art therapy and writing in her journal which were both coping mechanisms she used on her prior admission. She is hopeful that the medication changes will be helpful. Encouraged to attend groups and come to staff with questions and concerns. Denies SI/HI and hallucinations.

## 2023-09-11 NOTE — TREATMENT TEAM
09/11/23 1230   Activity/Group Checklist   Group Other (Comment)  (art therapy)   Attendance Attended   Attendance Duration (min) 46-60   Interactions Interacted appropriately   Affect/Mood Appropriate   Goals Achieved Able to listen to others; Able to engage in interactions; Other (Comment)  (authentic,spontaneous self expression, connection and insight)

## 2023-09-12 PROCEDURE — 99233 SBSQ HOSP IP/OBS HIGH 50: CPT | Performed by: PSYCHIATRY & NEUROLOGY

## 2023-09-12 RX ADMIN — LEVOTHYROXINE SODIUM 75 MCG: 75 TABLET ORAL at 06:32

## 2023-09-12 RX ADMIN — ESCITALOPRAM OXALATE 10 MG: 10 TABLET ORAL at 09:00

## 2023-09-12 RX ADMIN — LOSARTAN POTASSIUM 75 MG: 50 TABLET, FILM COATED ORAL at 08:59

## 2023-09-12 RX ADMIN — ATORVASTATIN CALCIUM 40 MG: 40 TABLET, FILM COATED ORAL at 15:59

## 2023-09-12 NOTE — NURSING NOTE
Pt has been withdrawn to room for majority of day, pleasant upon approach. Pt stated, "I had a really bad yesterday, I would have tried to hurt myself if I was at home. Today I feel a little better, but if I didn't wake up I would be OK with that."  Pt does not have a plan and is able to contract for safety. Reports her job being a major stressor. Pt encouraged to continue using coping skills and talking with staff if she has any urges to harm herself. Pt was receptive.

## 2023-09-12 NOTE — PROGRESS NOTES
Progress Note - Behavioral Health   Jodi Dee 47 y.o. female MRN: [id number]  Unit/Bed#: [facility] 207-01 Encounter: [id number]    Assessment:  Principal Problem:    Recurrent major depressive disorder ([address])  Active Problems:    Anxiety    Medical clearance for psychiatric admission    Hypothyroidism    Hypertension    Hyperlipidemia      Plan:  --Increase Lexapro to 10mg PO QD today  --Continue with psychiatric hospitalization  --Continue with individual, group, and milieu therapy  --Continue the following medications:  Current Facility-Administered Medications   Medication Dose Route Frequency   • aluminum-magnesium hydroxide-simethicone (MAALOX) oral suspension 30 mL  30 mL Oral Q4H PRN   • atorvastatin (LIPITOR) tablet 40 mg  40 mg Oral Daily With Dinner   • haloperidol lactate (HALDOL) injection 2.5 mg  2.5 mg Intramuscular Q4H PRN Max 4/day    And   • LORazepam (ATIVAN) injection 1 mg  1 mg Intramuscular Q4H PRN Max 4/day    And   • benztropine (COGENTIN) injection 0.5 mg  0.5 mg Intramuscular Q4H PRN Max 4/day   • haloperidol lactate (HALDOL) injection 5 mg  5 mg Intramuscular Q4H PRN Max 4/day    And   • LORazepam (ATIVAN) injection 2 mg  2 mg Intramuscular Q4H PRN Max 4/day    And   • benztropine (COGENTIN) injection 1 mg  1 mg Intramuscular Q4H PRN Max 4/day   • benztropine (COGENTIN) tablet 1 mg  1 mg Oral Q4H PRN Max 6/day   • bisacodyl (DULCOLAX) rectal suppository 10 mg  10 mg Rectal Daily PRN   • hydrOXYzine HCL (ATARAX) tablet 50 mg  50 mg Oral Q6H PRN Max 4/day    Or   • diphenhydrAMINE (BENADRYL) injection 50 mg  50 mg Intramuscular Q6H PRN   • escitalopram (LEXAPRO) tablet 10 mg  10 mg Oral Daily   • haloperidol (HALDOL) tablet 1 mg  1 mg Oral Q6H PRN   • haloperidol (HALDOL) tablet 2.5 mg  2.5 mg Oral Q4H PRN Max 4/day   • haloperidol (HALDOL) tablet 5 mg  5 mg Oral Q4H PRN Max 4/day   • hydrOXYzine HCL (ATARAX) tablet 100 mg  100 mg Oral Q6H PRN Max 4/day    Or   • LORazepam (ATIVAN) injection 2 mg  2 mg Intramuscular Q6H PRN   • hydrOXYzine HCL (ATARAX) tablet 25 mg  25 mg Oral Q6H PRN Max 4/day   • ibuprofen (MOTRIN) tablet 400 mg  400 mg Oral Q4H PRN   • ibuprofen (MOTRIN) tablet 600 mg  600 mg Oral Q6H PRN   • ibuprofen (MOTRIN) tablet 800 mg  800 mg Oral Q8H PRN   • levothyroxine tablet 75 mcg  75 mcg Oral Early Morning   • losartan (COZAAR) tablet 75 mg  75 mg Oral Daily   • polyethylene glycol (MIRALAX) packet 17 g  17 g Oral Daily PRN   • senna-docusate sodium (SENOKOT S) 8.6-50 mg per tablet 1 tablet  1 tablet Oral Daily PRN   • traZODone (DESYREL) tablet 50 mg  50 mg Oral HS PRN       Subjective: Patient was seen for continuation of care. Chart was reviewed and discussed with treatment team.     No acute behavioral events over the past 24 hours. Today, patient was seen and examined at bedside for continuation of care. Today, patient reports feeling depressed and having a bad day. She is tolerating Lexapro but state she feels no effect from it yet. Discussed titration plan. She is passively suicidal but without plan or intent. She is conditionally suicidal if she goes home, but feels safe here. Patient denied adverse effects to their psychiatric medication regimen. Patient denied other new or worsening psychiatric symptoms/complaints at this time. Discussed the importance of continuing to take medications as prescribed, as well as the importance of continuing to attend groups on the unit. Psychiatric Review of Systems:  Medication adverse effects: none  Sleep: unchanged  Appetite: unchanged  Behavior over the past 24 hours: as per above    Vitals:  Vitals:    09/12/23 0730   BP: 117/78   Pulse: 58   Resp: 16   Temp: (!) 97.3 °F (36.3 °C)   SpO2:        Laboratory results:    I have personally reviewed all pertinent laboratory/tests results  No results found for this or any previous visit (from the past 48 hour(s)). Current Medications:  Current medications as per above.  All medications have been reviewed. Risks, benefits, alternatives, and possible side effects of patient's psychiatric medications were discussed with patient. Mental Status Evaluation:  Appearance: casually dressed, consistent with stated age  Motor: +psychomotor retardation, no gait abnormalities  Behavior: cooperative, answers questions appropriately  Speech: soft, normal rhythm  Mood: "I had a bad day"  Affect: constricted, depressed-appearing  Thought Process: linear and goal-oriented  Thought Content: denies auditory hallucinations, denies visual hallucinations, denies delusions  Risk Potential: +passive suicidal ideation, but WITHOUT plan, or intent. Denies homicidal ideation  Sensorium: Oriented to person, place, time, and situation  Cognition: cognitive ability appears intact but was not quantitatively tested  Consciousness: alert and awake  Attention: intact, able to focus without difficulty  Insight: fair  Judgement: limited        Progress Toward Goals & Illness Status: Patient is not at goal. They are not yet ready for discharge. The patient's condition currently requires active psychopharmacological medication management, interdisciplinary coordination with case management, and the utilization of adjunctive milieu and group therapy to augment psychopharmacological efficacy. The patient's risk of morbidity, and progression or decompensation of psychiatric disease, is higher without this current treatment. This note has been constructed using a voice recognition system. There may be translation, syntax, or grammatical errors. If you have any questions, please contact the dictating provider.

## 2023-09-13 PROCEDURE — 99232 SBSQ HOSP IP/OBS MODERATE 35: CPT | Performed by: PSYCHIATRY & NEUROLOGY

## 2023-09-13 RX ADMIN — ESCITALOPRAM OXALATE 10 MG: 10 TABLET ORAL at 08:51

## 2023-09-13 RX ADMIN — LOSARTAN POTASSIUM 75 MG: 50 TABLET, FILM COATED ORAL at 08:51

## 2023-09-13 RX ADMIN — ATORVASTATIN CALCIUM 40 MG: 40 TABLET, FILM COATED ORAL at 17:05

## 2023-09-13 RX ADMIN — LEVOTHYROXINE SODIUM 75 MCG: 75 TABLET ORAL at 06:11

## 2023-09-13 NOTE — PROGRESS NOTES
Progress Note - Behavioral Health   Jodi Dee 47 y.o. female MRN: [id number]  Unit/Bed#: [facility] 207-01 Encounter: [id number]    Assessment:  Principal Problem:    Recurrent major depressive disorder ([address])  Active Problems:    Anxiety    Medical clearance for psychiatric admission    Hypothyroidism    Hypertension    Hyperlipidemia      Plan:  --Plan for DC tomorrow  --Continue with psychiatric hospitalization  --Continue with individual, group, and milieu therapy  --Continue the following medications:  Current Facility-Administered Medications   Medication Dose Route Frequency   • aluminum-magnesium hydroxide-simethicone (MAALOX) oral suspension 30 mL  30 mL Oral Q4H PRN   • atorvastatin (LIPITOR) tablet 40 mg  40 mg Oral Daily With Dinner   • haloperidol lactate (HALDOL) injection 2.5 mg  2.5 mg Intramuscular Q4H PRN Max 4/day    And   • LORazepam (ATIVAN) injection 1 mg  1 mg Intramuscular Q4H PRN Max 4/day    And   • benztropine (COGENTIN) injection 0.5 mg  0.5 mg Intramuscular Q4H PRN Max 4/day   • haloperidol lactate (HALDOL) injection 5 mg  5 mg Intramuscular Q4H PRN Max 4/day    And   • LORazepam (ATIVAN) injection 2 mg  2 mg Intramuscular Q4H PRN Max 4/day    And   • benztropine (COGENTIN) injection 1 mg  1 mg Intramuscular Q4H PRN Max 4/day   • benztropine (COGENTIN) tablet 1 mg  1 mg Oral Q4H PRN Max 6/day   • bisacodyl (DULCOLAX) rectal suppository 10 mg  10 mg Rectal Daily PRN   • hydrOXYzine HCL (ATARAX) tablet 50 mg  50 mg Oral Q6H PRN Max 4/day    Or   • diphenhydrAMINE (BENADRYL) injection 50 mg  50 mg Intramuscular Q6H PRN   • escitalopram (LEXAPRO) tablet 10 mg  10 mg Oral Daily   • haloperidol (HALDOL) tablet 1 mg  1 mg Oral Q6H PRN   • haloperidol (HALDOL) tablet 2.5 mg  2.5 mg Oral Q4H PRN Max 4/day   • haloperidol (HALDOL) tablet 5 mg  5 mg Oral Q4H PRN Max 4/day   • hydrOXYzine HCL (ATARAX) tablet 100 mg  100 mg Oral Q6H PRN Max 4/day    Or   • LORazepam (ATIVAN) injection 2 mg  2 mg Intramuscular Q6H PRN   • hydrOXYzine HCL (ATARAX) tablet 25 mg  25 mg Oral Q6H PRN Max 4/day   • ibuprofen (MOTRIN) tablet 400 mg  400 mg Oral Q4H PRN   • ibuprofen (MOTRIN) tablet 600 mg  600 mg Oral Q6H PRN   • ibuprofen (MOTRIN) tablet 800 mg  800 mg Oral Q8H PRN   • levothyroxine tablet 75 mcg  75 mcg Oral Early Morning   • losartan (COZAAR) tablet 75 mg  75 mg Oral Daily   • polyethylene glycol (MIRALAX) packet 17 g  17 g Oral Daily PRN   • senna-docusate sodium (SENOKOT S) 8.6-50 mg per tablet 1 tablet  1 tablet Oral Daily PRN   • traZODone (DESYREL) tablet 50 mg  50 mg Oral HS PRN       Subjective: Patient was seen for continuation of care. Chart was reviewed and discussed with treatment team.     No acute behavioral events over the past 24 hours. Today, patient was seen and examined at bedside for continuation of care. Today, patient reports that she wants to be discharged ASAP. She is having difficulty staying comfortable in the milieu and attributes this to her room mates snoring. She states her mental health symptoms are "back to normal" and "I'll handle things myself outside of the hospital, I am fine." We discussed safety planning and the patient is able to confidently say she can be safe in the community. She is displaying some cluster B personality traits; she is attempting to split staff and is upset that staff is not spending more time discussing the content of her dreams with her. She explicitly denies SI, intent, or plan. Patient denied adverse effects to their psychiatric medication regimen. Patient denied other new or worsening psychiatric symptoms/complaints at this time. Discussed the importance of continuing to take medications as prescribed, as well as the importance of continuing to attend groups on the unit.      Psychiatric Review of Systems:  Medication adverse effects: none  Sleep: unchanged  Appetite: unchanged  Behavior over the past 24 hours: as per above    Vitals:  Vitals: 09/13/23 0734   BP: 139/88   Pulse: 78   Resp: 18   Temp: (!) 97.4 °F (36.3 °C)   SpO2:        Laboratory results:    I have personally reviewed all pertinent laboratory/tests results  No results found for this or any previous visit (from the past 48 hour(s)). Current Medications:  Current medications as per above. All medications have been reviewed. Risks, benefits, alternatives, and possible side effects of patient's psychiatric medications were discussed with patient. Mental Status Evaluation:  Appearance: casually dressed, appears consistent with stated age  Motor: no psychomotor disturbances, no gait abnormalities  Behavior: cooperative, interacts with this writer appropriately  Speech: normal rate, rhythm, and volume  Mood: "good"  Affect: constricted  Thought Process: organized, linear, and goal-oriented  Thought Content: denies auditory hallucinations, denies visual hallucinations, denies delusions  Risk Potential: denies suicidal ideation, plan, or intent. Denies homicidal ideation  Sensorium: Oriented to person, place, time, and situation  Cognition: cognitive ability appears intact but was not quantitatively tested  Consciousness: alert and awake  Attention: able to focus without difficulty  Insight: fair  Judgement: fair          Progress Toward Goals & Illness Status: Patient is not at goal. They are not yet ready for discharge. The patient's condition currently requires active psychopharmacological medication management, interdisciplinary coordination with case management, and the utilization of adjunctive milieu and group therapy to augment psychopharmacological efficacy. The patient's risk of morbidity, and progression or decompensation of psychiatric disease, is higher without this current treatment. This note has been constructed using a voice recognition system. There may be translation, syntax, or grammatical errors.  If you have any questions, please contact the dictating provider.

## 2023-09-13 NOTE — NURSING NOTE
Patient irritable this shift. Stated "nobody ever lets me know when group is starting. That's why I didn't go all day!" She was encouraged to attend evening group. When patient saw that she had a new roommate, she became irritated, and yelled out, "of course! I knew it was too good to be true!" She was counseled on appropriate behaviors and is not in a private room. She laid down on her floor and said she was going to sleep on her floor. Several staff encouraged her to lay in her bed, but she refused.

## 2023-09-13 NOTE — PROGRESS NOTES
Pt reported passive SI without a plan. Pt reported she feels like no one cares. Had outburst last evening. Slept on the floor. DC: TBD      09/13/23 9721   Team Meeting   Meeting Type Daily Rounds   Team Members Present   Team Members Present Physician;Nurse;; Other (Discipline and Name)   Physician Team Member Dr. Keyon Cabrera / Dr. Calin Pittman / Aleja Delgado / Esther Singh Team Member Liat Sal / [facility] Management Team Member Colette Mendoza / Robyn Apley / Soco Ling   Other (Discipline and Name) Zuleyma Lito - Art Therapy / Sigmund - Group Facilitator   Patient/Family Present   Patient Present No   Patient's Family Present No

## 2023-09-13 NOTE — CASE MANAGEMENT
CM sent transportation request to [employer] transport for pt dc on Thursday 9/14/2023 to her home at [address]. Pt scheduled for 10am transport.

## 2023-09-13 NOTE — NURSING NOTE
Pt remains mostly withdrawn to room, resting in bed. Pt denies SI/HI, minimal and appears disinterested in conversation however makes negative statements regarding treatment. Encouraged pt to notify staff with any concerns. Encouraged pt to attend groups, pt declined.

## 2023-09-13 NOTE — PLAN OF CARE
Problem: SELF HARM/SUICIDALITY  Goal: Will have no self-injury during hospital stay  Description: INTERVENTIONS:  - Q 15 MINUTES: Routine safety checks  - Q WAKING SHIFT & PRN: Assess risk to determine if routine checks are adequate to maintain patient safety  - Encourage patient to participate actively in care by formulating a plan to combat response to suicidal ideation, identify supports and resources  Outcome: Progressing     Problem: ANXIETY  Goal: Will report anxiety at manageable levels  Description: INTERVENTIONS:  - Administer medication as ordered  - Teach and encourage coping skills  - Provide emotional support  - Assess patient/family for anxiety and ability to cope  Outcome: Progressing     Problem: SLEEP DISTURBANCE  Goal: Will exhibit normal sleeping pattern  Description: Interventions:  -  Assess the patients sleep pattern, noting recent changes  - Administer medication as ordered  - Decrease environmental stimuli, including noise, as appropriate during the night  - Encourage the patient to actively participate in unit groups and or exercise during the day to enhance ability to achieve adequate sleep at night  - Assess the patient, in the morning, encouraging a description of sleep experience  Outcome: Progressing

## 2023-09-13 NOTE — NURSING NOTE
Pt quiet and withdrawn to self in room, reading book. More positive this shift. Apologized to roommate for last nights behavior upon her admission. States "I feel like I don't have control over my emotions at times". No voiced complaints or concerns at this time.  Denies SI/HI/AVH

## 2023-09-13 NOTE — CASE MANAGEMENT
CM called [phone] ([phone]) to see if pt can be scheduled for OP intake for Psychiatry after dc on Thursday 9/14/23. CM spoke to [place] to schedule pt. Pt is scheduled for PHONE Intake and Treatment plan for medication management on Monday 9/18/23 at 11am.     Pt can then choose if she wants to continue with Phone appointments or In person.

## 2023-09-14 VITALS
HEIGHT: 64 IN | TEMPERATURE: 98.7 F | RESPIRATION RATE: 17 BRPM | HEART RATE: 80 BPM | WEIGHT: 186 LBS | OXYGEN SATURATION: 98 % | SYSTOLIC BLOOD PRESSURE: 121 MMHG | BODY MASS INDEX: 31.76 KG/M2 | DIASTOLIC BLOOD PRESSURE: 77 MMHG

## 2023-09-14 PROCEDURE — 99239 HOSP IP/OBS DSCHRG MGMT >30: CPT | Performed by: PSYCHIATRY & NEUROLOGY

## 2023-09-14 RX ORDER — LOSARTAN POTASSIUM 25 MG/1
75 TABLET ORAL DAILY
Qty: 90 TABLET | Refills: 0 | Status: SHIPPED | OUTPATIENT
Start: 2023-09-14 | End: 2023-10-14

## 2023-09-14 RX ORDER — ESCITALOPRAM OXALATE 10 MG/1
10 TABLET ORAL DAILY
Qty: 30 TABLET | Refills: 0 | Status: SHIPPED | OUTPATIENT
Start: 2023-09-14 | End: 2023-10-14

## 2023-09-14 RX ADMIN — ESCITALOPRAM OXALATE 10 MG: 10 TABLET ORAL at 08:55

## 2023-09-14 RX ADMIN — LOSARTAN POTASSIUM 75 MG: 50 TABLET, FILM COATED ORAL at 08:55

## 2023-09-14 RX ADMIN — LEVOTHYROXINE SODIUM 75 MCG: 75 TABLET ORAL at 06:05

## 2023-09-14 NOTE — BH TRANSITION RECORD
Contact Information: If you have any questions, concerns, pended studies, tests and/or procedures, or emergencies regarding your inpatient behavioral health visit. Please contact [address] behavioral health unit [phone] and ask to speak to a , nurse or physician. A contact is available 24 hours/ 7 days a week at this number. Summary of Procedures Performed During your Stay:  Below is a list of major procedures performed during your hospital stay and a summary of results:  - No major procedures performed. Pending Studies (From admission, onward)    None        Please follow up on the above pending studies with your PCP and/or referring provider.

## 2023-09-14 NOTE — PLAN OF CARE
Problem: SELF HARM/SUICIDALITY  Goal: Will have no self-injury during hospital stay  Description: INTERVENTIONS:  - Q 15 MINUTES: Routine safety checks  - Q WAKING SHIFT & PRN: Assess risk to determine if routine checks are adequate to maintain patient safety  - Encourage patient to participate actively in care by formulating a plan to combat response to suicidal ideation, identify supports and resources  Outcome: Adequate for Discharge     Problem: DEPRESSION  Goal: Will be euthymic at discharge  Description: INTERVENTIONS:  - Administer medication as ordered  - Provide emotional support via 1:1 interaction with staff  - Encourage involvement in milieu/groups/activities  - Monitor for social isolation  Outcome: Adequate for Discharge     Problem: ANXIETY  Goal: Will report anxiety at manageable levels  Description: INTERVENTIONS:  - Administer medication as ordered  - Teach and encourage coping skills  - Provide emotional support  - Assess patient/family for anxiety and ability to cope  Outcome: Adequate for Discharge  Goal: By discharge: Patient will verbalize 2 strategies to deal with anxiety  Description: Interventions:  - Identify any obvious source/trigger to anxiety  - Staff will assist patient in applying identified coping technique/skills  - Encourage attendance of scheduled groups and activities  Outcome: Adequate for Discharge     Problem: SLEEP DISTURBANCE  Goal: Will exhibit normal sleeping pattern  Description: Interventions:  -  Assess the patients sleep pattern, noting recent changes  - Administer medication as ordered  - Decrease environmental stimuli, including noise, as appropriate during the night  - Encourage the patient to actively participate in unit groups and or exercise during the day to enhance ability to achieve adequate sleep at night  - Assess the patient, in the morning, encouraging a description of sleep experience  Outcome: Adequate for Discharge     Problem: SELF CARE DEFICIT  Goal: Return ADL status to a safe level of function  Description: INTERVENTIONS:  - Administer medication as ordered  - Assess ADL deficits and provide assistive devices as needed  - Obtain PT/OT consults as needed  - Assist and instruct patient to increase activity and self care as tolerated  Outcome: Adequate for Discharge     Problem: Ineffective Coping  Goal: Participates in unit activities  Description: Interventions:  - Provide therapeutic environment   - Provide required programming   - Redirect inappropriate behaviors   Outcome: Adequate for Discharge     Problem: DISCHARGE PLANNING  Goal: Discharge to home or other facility with appropriate resources  Description: INTERVENTIONS:  - Identify barriers to discharge w/patient and caregiver  - Arrange for needed discharge resources and transportation as appropriate  - Identify discharge learning needs (meds, wound care, etc.)  - Arrange for interpretive services to assist at discharge as needed  - Refer to Case Management Department for coordinating discharge planning if the patient needs post-hospital services based on physician/advanced practitioner order or complex needs related to functional status, cognitive ability, or social support system  Outcome: Adequate for Discharge     Problem: Ineffective Coping  Goal: Identifies ineffective coping skills  Outcome: Adequate for Discharge  Goal: Identifies healthy coping skills  Outcome: Adequate for Discharge  Goal: Demonstrates healthy coping skills  Outcome: Adequate for Discharge

## 2023-09-14 NOTE — DISCHARGE SUMMARY
Discharge Summary - [address] 47 y.o. female MRN: [id number]  Unit/Bed#: [facility] 207-01 Encounter: [id number]     Admission Date: 9/6/2023         Discharge Date: No discharge date for patient encounter. Attending Psychiatrist: Viki Harley,     Reason for Admission/HPI:     Per HPI from admission H&P obtained by Dr. Henri Barahona on 9/7/23:    "Per ED provider note:  Patient with a history of depression.  History of suicide attempt 2 years ago by overdosing on pills.  Was recently admitted to [address] [place] [address] in July.  Now complains of feeling depressed and having suicidal ideation. Carmela Sauer is to overdose on her pills.  Wrote a note last night to her family. Lyndsey Warren any hallucinations.  Compliant with her medications.  Denies any alcohol or drug use.  Would like to go back to [address] [place] [address].  No change in appetite.  Decreased sleep.     On admission to Inpatient Psychiatric Unit:  Patient is a 50-year-old white female with a past psychiatric history of major depressive disorder with anxiety component who presents voluntarily to inpatient U with suicidal ideation with a plan to overdose.     Symptoms prior to hospitalization include: Depressed mood, suicidal ideation with plan to overdose on pills, decreased energy, decreased concentration, decreased motivation, hopelessness, helplessness, insomnia, and nightmares. Timeline is progressively worsening over the course of the past month and a half. Severity is rated as severe. Mitigating factors are none. Exacerbating stressors include work stress interacting with other employees, health stress related to chronic Achilles/foot pain, and financial stress.     Today, the patient states that she has had a stressful time at home, specifically with work, her health, and her finances.   She states that she was feeling increasing pressure as a  at [employer], and went to [employer] to complain about interactions with another staff member there who is also a manager. 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 She is also experiencing health stress related to chronic foot and Achilles pain that is not treatable. She also has financial stress. As a result of these stressors, the patient has been depressed and suicidal with a plan to overdose. She has 1 prior suicide attempt. She has been experiencing nightmares and decreased sleep. She states that her Zoloft and BuSpar which she was taking before are no longer helping her and she is agreeable with plan to start Remeron. She denies manic or psychotic symptoms. She admits to generalized worrying and anxiety."      Social History     Tobacco History     Smoking Status  Never    Smokeless Tobacco Use  Never    Tobacco Comments  Non-smoker. Alcohol History     Alcohol Use Status  Never          Drug Use     Drug Use Status  Never          Sexual Activity     Sexually Active  Not Currently          Activities of Daily Living    Not Asked               Additional Substance Use Detail     Questions Responses    Problems Due to Past Use of Alcohol? No    Problems Due to Past Use of Substances?  No    Substance Use Assessment Denies substance use within the past 12 months    Alcohol Use Frequency Denies use in past 12 months    Cannabis frequency Never used    Comment:  Never used on 7/9/2023     Heroin Frequency Denies use in past 12 months    Cocaine frequency Never used    Comment:  Never used on 7/9/2023     Crack Cocaine Frequency Denies use in past 12 months    Methamphetamine Frequency Denies use in past 12 months    Narcotic Frequency Denies use in past 12 months    Benzodiazepine Frequency Denies use in past 12 months    Amphetamine frequency Denies use in past 12 months    Barbituate Frequency Denies use use in past 12 months    Inhalant frequency Never used    Comment:  Never used on 2023     Hallucinogen frequency Never used    Comment:  Never used on 2023     Ecstasy frequency Never used    Comment:  Never used on 2023     Other drug frequency Never used    Comment:  Never used on 2023     Opiate frequency Denies use in past 12 months    Last reviewed by Jeff Gay RN on 2023          Past Medical History:   Diagnosis Date   • Coronary artery disease    • Disease of thyroid gland    • Hypertension    • MI (myocardial infarction) ([address])    • Suicide ([address])     Attempt to OD on tylenol in      Past Surgical History:   Procedure Laterality Date   • HYSTERECTOMY         Medications: All current active medications have been reviewed. Medications prior to admission:    Prior to Admission Medications   Prescriptions Last Dose Informant Patient Reported? Taking?    Cholecalciferol 1.25 MG (57265 UT) capsule   Yes No   Sig: Take 1 capsule by mouth once a week   atorvastatin (LIPITOR) 40 mg tablet   No No   Sig: Take 1 tablet (40 mg total) by mouth daily with dinner   busPIRone (BUSPAR) 5 mg tablet   No No   Sig: Take 1 tablet (5 mg total) by mouth 2 (two) times a day   Patient taking differently: Take 5 mg by mouth 3 (three) times a day   levothyroxine 75 mcg tablet   No No   Sig: Take 1 tablet (75 mcg total) by mouth daily in the early morning   losartan (COZAAR) 25 mg tablet   No No   Sig: Take 1 tablet (25 mg total) by mouth 2 (two) times a day   Patient taking differently: Take 75 mg by mouth daily   nabumetone (RELAFEN) 500 mg tablet   Yes No   Si mg 2 (two) times a day   rosuvastatin (CRESTOR) 10 MG tablet   Yes No   Sig: Take 40 mg by mouth daily   sertraline (ZOLOFT) 25 mg tablet   No No   Sig: Take 1 tablet (25 mg total) by mouth daily   Patient taking differently: Take 50 mg by mouth daily   traZODone (DESYREL) 100 mg tablet   No No   Sig: Take 1 tablet (100 mg total) by mouth daily at bedtime   Patient not taking: Reported on 9/6/2023      Facility-Administered Medications: None       Allergies: Allergies   Allergen Reactions   • Acetaminophen Other (See Comments)     Pt attempted to OD on this med in the past and doesn't want to take it ever again       Objective     Vital signs in last 24 hours:    Temp:  [98.1 °F (36.7 °C)-98.7 °F (37.1 °C)] 98.7 °F (37.1 °C)  HR:  [68-80] 80  Resp:  [16-17] 17  BP: ()/(55-77) 121/77    No intake or output data in the 24 hours ending 09/14/23 [address] [place] [address] Course: Eugene Gallagher was admitted to the inpatient psychiatric unit and started on Behavioral Health checks every 7 minutes. During the hospitalization she was encouraged to attend individual therapy, group therapy, milieu therapy and occupational therapy. Psychiatric medications were adjusted over the hospital stay. To address depressive symptoms and anxiety symptoms, Eugene Gallagher was treated with antidepressant Zoloft, Lexapro, Remeron and Trazodone and anxiolytic medication Buspar. Medication doses were adjusted during the hospital course. Zoloft was added initially, but then discontinued in favor of Remeron. Remeron was added at the dose of 7.5mg qhs but then discontinued in favor of Lexapro. Lexapro was added and titrated to 10mg daily with good response. Trazodone was trialed briefly at a dose of 50mg qhs for sleep but discontinued due to patient preference. Buspar was also discontinued due to patient preference. Prior to beginning of treatment medications risks and benefits and possible side effects including risk of suicidality and serotonin syndrome related to treatment with antidepressants were reviewed with Eugene Gallagher. She verbalized understanding and agreement for treatment. Upon admission Eugene Gallagher was seen by medical service for medical clearance for inpatient treatment and medical follow up. Jodi's symptoms slowly improved over the hospital course.  Initially after admission she was still feeling depressed, anxious and overwhelmed. With adjustment of medications and therapeutic milieu her symptoms gradually improved. At the end of treatment Cher Ford was improved. Her mood was more stable at the time of discharge. Cher Ford denied suicidal ideation, intent or plan at the time of discharge and denied homicidal ideation, intent or plan at the time of discharge. Cher Ford was participating appropriately in milieu at the time of discharge. Behavior was appropriate on the unit at the time of discharge. Sleep and appetite were improved. Cher Ford was tolerating medications and was not reporting any significant side effects at the time of discharge. We felt that at the end of the hospital stay Cher Ford was at baseline and was ready for discharge. Cher Ford also felt stable and ready for discharge at the end of the hospital stay. The outpatient follow up with a psychiatrist at [facility] was arranged by the unit  upon discharge. Mental Status at Time of Discharge:     Appearance: casually dressed, appears consistent with stated age, normal grooming  Motor: no psychomotor disturbances, no gait abnormalities  Behavior: calm, cooperative, interacts with this writer appropriately  Speech: normal rate, rhythm, and volume  Mood: "good"  Affect: appropriate, normal range and intensity, mood-congruent  Thought Process: organized, linear, and goal-oriented; intact associations  Thought Content: denies any delusional material, no preoccupation  Perception: denies any auditory or visual hallucinations, denies other perceptual disturbances  Risk Potential: denies suicidal ideation, plan, or intent.  Denies homicidal ideation  Sensorium: Oriented to person, place, time, and situation  Cognition: cognitive ability appears intact but was not quantitatively tested  Consciousness: alert and awake  Attention/Concentration: able to focus without difficulty, attention and concentration are age appropriate  Insight: improved  Judgement: improved    Admission Diagnosis:    Principal Problem:    Recurrent major depressive disorder ([address])  Active Problems:    Anxiety    Medical clearance for psychiatric admission    Hypothyroidism    Hypertension    Hyperlipidemia      Discharge Diagnosis:     Principal Problem:    Recurrent major depressive disorder (HCC)  Active Problems:    Anxiety    Medical clearance for psychiatric admission    Hypothyroidism    Hypertension    Hyperlipidemia  Resolved Problems:    * No resolved hospital problems. *      Lab Results:   I have personally reviewed all pertinent laboratory/tests results. Most Recent Labs:   Lab Results   Component Value Date    WBC 7.21 09/07/2023    RBC 4.49 09/07/2023    HGB 13.5 09/07/2023    HCT 40.9 09/07/2023     09/07/2023    RDW 12.4 09/07/2023    NEUTROABS 4.23 09/07/2023    SODIUM 141 09/07/2023    K 4.2 09/07/2023     09/07/2023    CO2 31 09/07/2023    BUN 22 09/07/2023    CREATININE 0.69 09/07/2023    GLUC 89 09/07/2023    GLUF 89 09/07/2023    CALCIUM 9.6 09/07/2023    AST 15 09/07/2023    ALT 12 09/07/2023    ALKPHOS 75 09/07/2023    TP 7.6 09/07/2023    ALB 4.3 09/07/2023    TBILI 0.52 09/07/2023    CHOLESTEROL 158 09/07/2023    HDL 53 09/07/2023    TRIG 142 09/07/2023    LDLCALC 77 09/07/2023    NONHDLC 105 09/07/2023    ZFR2KOTQXLRF 6.577 (H) 09/06/2023    FREET4 0.83 09/06/2023    PREGSERUM Negative 10/18/2020    HCGQUANT 1 07/08/2023    HGBA1C 5.6 05/05/2021     05/05/2021       Discharge Medications:    See after visit summary for all reconciled discharge medications provided to patient and family. Discharge instructions/Information to patient and family:     See after visit summary for information provided to patient and family. Provisions for Follow-Up Care:    See after visit summary for information related to follow-up care and any pertinent home health orders.       Discharge Statement:    I spent 35 minutes discharging the patient. This time was spent on the day of discharge. I had direct contact with the patient on the day of discharge. Additional documentation is required if more than 30 minutes were spent on discharge:    I reviewed with Jodi importance of compliance with medications and outpatient treatment after discharge. I discussed the medication regimen and possible side effects of the medications with Jodi prior to discharge. At the time of discharge she was tolerating psychiatric medications. I discussed outpatient follow up with Jodi. I reviewed with Marlene Broussard crisis plan and safety plan upon discharge. Marlene Broussard was competent to understand risks and benefits of withholding information and risks and benefits of her actions.     Discharge on Two Antipsychotic Medications: Jackeline Wells PA-C 09/14/23

## 2023-09-14 NOTE — PROGRESS NOTES
Status: Pt mostly withdrawn to her room, resting in bed. She denies any SI/HI or hallucinations. She makes negatives statements about her treatment & has decreased group attendance. Pt appeared to have slept overnight. Medication: no changes / no PRNs  D/C: Today - via Ly[employer] at 1000 / Pt has an intake at [facility] on 9/18 & she has a therapist from [facility] that she said she will call when she gets home. 09/14/23 0750   Team Meeting   Meeting Type Daily Rounds   Team Members Present   Team Members Present Physician;Nurse; Other (Discipline and Name);    Physician Team Member Dr. Carlos Alvarado / Dr. Leanne Novoa / Pascual Hurley / Terrie Ceron Team Member Sonya Landau / [facility] Team Member Tong Faye / Radha Billy   Other (Discipline and Name) Jose(art therapy) / Fuad(group facilitator)   Patient/Family Present   Patient Present No   Patient's Family Present No

## 2023-09-14 NOTE — NURSING NOTE
AVS discharge instructions reviewed with patient. Patient denies any questions and concerns and expresses readiness for discharge. Patient discharged from the unit pleasant and calm.

## 2023-09-14 NOTE — NURSING NOTE
Pt remains calm and cooperative. Reports mild anxiety regarding discharge however overall does feel readiness for this. Denies SI/HI. Reports sleeping well overnight. Denies any questions at this time.

## 2023-09-15 NOTE — DISCHARGE INSTR - APPOINTMENTS
Emanuel Ferris or Mechelle, jimbo Elmore, will be calling you after your discharge, on the phone number that you provided. They will be available as an additional support, if needed. If you wish to speak with one of them, you may contact Emanuel Ferris at [phone] or Calin Leal at [phone].

## 2023-09-15 NOTE — DISCHARGE INSTR - OTHER ORDERS
[address]   Emergency Crisis Services: [phone]  After hours (4:30 p.m.-8:30 a.m. and weekends/holidays):  [phone] or [phone]  Emergency crisis services are available 24 hours a day, 7 days a week, and 365 days a year. Walk-ins go to the rear of [address] [place] [address]. (behind [address] [facility] on [address] ).

## 2023-10-17 NOTE — CMS CERTIFICATION NOTE
Recertification: Based upon physical, mental and social evaluations, I certify that inpatient psychiatric services continue to be medically necessary for this patient for a duration of 7 midnights for the treatment of  Recurrent major depressive disorder ([address])   Available alternative community resources still do not meet the patient's mental health care needs. I further attest that an established written individualized plan of care has been updated and is outlined in the patient's medical records.
none

## 2023-10-24 ENCOUNTER — HOSPITAL ENCOUNTER (OUTPATIENT)
Dept: RADIOLOGY | Facility: CLINIC | Age: 53
Discharge: HOME | End: 2023-10-24
Payer: COMMERCIAL

## 2023-10-24 VITALS — BODY MASS INDEX: 32.61 KG/M2 | WEIGHT: 191 LBS | HEIGHT: 64 IN

## 2023-10-24 DIAGNOSIS — Z12.39 ENCOUNTER FOR OTHER SCREENING FOR MALIGNANT NEOPLASM OF BREAST: ICD-10-CM

## 2023-10-24 PROCEDURE — 77067 SCR MAMMO BI INCL CAD: CPT

## 2023-10-24 PROCEDURE — 77063 BREAST TOMOSYNTHESIS BI: CPT

## 2023-11-14 ENCOUNTER — HOSPITAL ENCOUNTER (OUTPATIENT)
Dept: NON INVASIVE DIAGNOSTICS | Facility: HOSPITAL | Age: 53
Discharge: HOME | End: 2023-11-14
Attending: INTERNAL MEDICINE
Payer: COMMERCIAL

## 2023-11-14 ENCOUNTER — HOSPITAL ENCOUNTER (OUTPATIENT)
Dept: NON INVASIVE DIAGNOSTICS | Facility: HOSPITAL | Age: 53
Discharge: HOME | End: 2023-11-14
Payer: COMMERCIAL

## 2023-11-14 VITALS
HEIGHT: 64 IN | DIASTOLIC BLOOD PRESSURE: 112 MMHG | WEIGHT: 200 LBS | BODY MASS INDEX: 34.15 KG/M2 | HEART RATE: 80 BPM | SYSTOLIC BLOOD PRESSURE: 171 MMHG

## 2023-11-14 DIAGNOSIS — E78.5 DYSLIPIDEMIA, GOAL LDL BELOW 130: ICD-10-CM

## 2023-11-14 DIAGNOSIS — R07.89 CHEST HEAVINESS: ICD-10-CM

## 2023-11-14 DIAGNOSIS — R06.02 SHORTNESS OF BREATH: ICD-10-CM

## 2023-11-14 DIAGNOSIS — I51.7 CARDIOMEGALY: ICD-10-CM

## 2023-11-14 DIAGNOSIS — R07.89 OTHER CHEST PAIN: ICD-10-CM

## 2023-11-14 DIAGNOSIS — Z82.49 FH: HEART DISEASE: ICD-10-CM

## 2023-11-14 DIAGNOSIS — I10 ESSENTIAL HYPERTENSION: ICD-10-CM

## 2023-11-14 DIAGNOSIS — I25.10 ATHEROSCLEROSIS OF NATIVE CORONARY ARTERY WITHOUT ANGINA PECTORIS, UNSPECIFIED WHETHER NATIVE OR TRANSPLANTED HEART: ICD-10-CM

## 2023-11-14 LAB
AORTIC ROOT: 3.1 CM
APICAL FOUR CHAMBER EJECTION FRACTION: 60 %
E WAVE DECELERATION TIME: 196 MS
E/A RATIO: 1.35
FRACTIONAL SHORTENING: 28 (ref 28–44)
INTERVENTRICULAR SEPTUM IN DIASTOLE (PARASTERNAL SHORT AXIS VIEW): 1.2 CM
INTERVENTRICULAR SEPTUM: 1.2 CM (ref 0.6–1.1)
LAAS-AP2: 17.4 CM2
LAAS-AP4: 15.7 CM2
LEFT ATRIUM SIZE: 4 CM
LEFT ATRIUM VOLUME (MOD BIPLANE): 43 ML
LEFT ATRIUM VOLUME INDEX (MOD BIPLANE): 22.4 ML/M2
LEFT INTERNAL DIMENSION IN SYSTOLE: 3.4 CM (ref 2.1–4)
LEFT VENTRICLE DIASTOLIC VOLUME (MOD BIPLANE): 95 ML
LEFT VENTRICLE SYSTOLIC VOLUME (MOD BIPLANE): 46 ML
LEFT VENTRICULAR INTERNAL DIMENSION IN DIASTOLE: 4.7 CM (ref 3.5–6)
LEFT VENTRICULAR POSTERIOR WALL IN END DIASTOLE: 1.1 CM
LEFT VENTRICULAR STROKE VOLUME: 56 ML
LV EF: 52 %
LVSV (TEICH): 56 ML
MV E'TISSUE VEL-LAT: 10 CM/S
MV E'TISSUE VEL-SEP: 11 CM/S
MV PEAK A VEL: 0.63 M/S
MV PEAK E VEL: 85 CM/S
MV STENOSIS PRESSURE HALF TIME: 57 MS
MV VALVE AREA P 1/2 METHOD: 3.86
RIGHT ATRIUM AREA SYSTOLE A4C: 11.5 CM2
RIGHT VENTRICLE ID DIMENSION: 3.3 CM
SL CV LEFT ATRIUM LENGTH A2C: 5.3 CM
SL CV LV EF: 55
SL CV PED ECHO LEFT VENTRICLE DIASTOLIC VOLUME (MOD BIPLANE) 2D: 103 ML
SL CV PED ECHO LEFT VENTRICLE SYSTOLIC VOLUME (MOD BIPLANE) 2D: 47 ML
TRICUSPID ANNULAR PLANE SYSTOLIC EXCURSION: 2.2 CM

## 2023-11-14 PROCEDURE — 93306 TTE W/DOPPLER COMPLETE: CPT

## 2023-11-14 RX ORDER — DESVENLAFAXINE SUCCINATE 50 MG/1
50 TABLET, EXTENDED RELEASE ORAL DAILY
COMMUNITY
Start: 2023-11-02

## 2023-11-14 RX ORDER — ROSUVASTATIN CALCIUM 40 MG/1
TABLET, COATED ORAL
COMMUNITY

## 2023-11-14 RX ADMIN — PERFLUTREN 0.4 ML/MIN: 6.52 INJECTION, SUSPENSION INTRAVENOUS at 13:45

## 2023-11-14 NOTE — PROGRESS NOTES
Patient arrived for Dobutamine SE.BP was 172/112. Patient did not take any of her meds today prior to arrival. Discussed with Dr Prisca Waters. He requests an echo be done but we will not proceed with Dobutamine SE because of elevated BP. IV placed for Definity needed for echo.

## 2024-07-11 NOTE — PROGRESS NOTES
[facility] Group Note     07/10/23 1230   Activity/Group Checklist   Group Personal control  (Mindfulness Technique - 5 Senses Grounding and Mindful Perspective Discussion)   Attendance Attended   Attendance Duration (min) 46-60   Interactions Interacted appropriately   Affect/Mood Appropriate;Calm  (Focused)   Goals Achieved Discussed coping strategies; Able to listen to others; Able to engage in interactions; Able to recieve feedback; Able to give feedback to another  (benefited from social presence of the group) chest wall non-tender, breathing is unlabored without accessory muscle use, normal breath sounds

## 2025-04-30 NOTE — NURSING NOTE
04/30/25 1030   Pain Assessment   Pain Assessment Tool 0-10   Pain Score 2   Pain Location/Orientation Orientation: Left;Location: Knee   Restrictions/Precautions   Precautions Bed/chair alarms;Fall Risk;Fluid restriction;Supervision on toilet/commode   Weight Bearing Restrictions No   ROM Restrictions No   Grooming   Able To Initiate Tasks;Comb/Brush Hair;Wash/Dry Face;Wash/Dry Hands   Limitation Noted In Safety;Strength   Findings setup seated   Shower/Bathe Self   Type of Assistance Needed Incidental touching   Shower/Bathe Self CARE Score 4   Bathing   Assessed Bath Style Shower   Anticipated D/C Bath Style Shower;Sponge Bath   Able to Gather/Transport No   Able to Adjust Water Temperature No   Able to Wash/Rinse/Dry (body part) Left Arm;Right Arm;L Upper Leg;R Upper Leg;L Lower Leg/Foot;R Lower Leg/Foot;Chest;Abdomen;Perineal Area;Buttocks   Limitations Noted in Balance;Endurance;Problem Solving;ROM;Safety;Strength   Positioning Seated;Standing   Adaptive Equipment Longhand Sponge;Longhand Reacher;Tub Bench;Shower Bars;Hand Held Shower   Findings  dressing removed prior to shower and replaced by RN after   Tub/Shower Transfer   Limitations Noted In Balance;Endurance;Problem Solving;ROM;Safety;LE Strength   Adaptive Equipment Transfer Bench   Assessed Shower   Findings CGA   Upper Body Dressing   Type of Assistance Needed Set-up / clean-up   Upper Body Dressing CARE Score 5   Lower Body Dressing   Type of Assistance Needed Incidental touching   Lower Body Dressing CARE Score 4   Putting On/Taking Off Footwear   Type of Assistance Needed Physical assistance   Physical Assistance Level 25% or less   Putting On/Taking Off Footwear CARE Score 3   Picking Up Object   Type of Assistance Needed Incidental touching   Comment supporting with one arm and reching with the other   Picking Up Object CARE Score 4   Dressing/Undressing Clothing   Remove UB Clothes Pullover Shirt   Don UB Clothes Pullover Shirt   Remove LB  Patient was resting in bed with eyes closed, no episodes of restlessness observed. Safety rounds maintained throughout the night. Clothes Pants;Undergarment;Socks;Shoes   Don LB Clothes Pants;Undergarment;Socks;Shoes   Limitations Noted In Balance;Endurance;Problem Solving;Safety;Strength;ROM   Adaptive Equipment Reacher;LH Shoehorn;Sock Aide;Dressing Stick   Positioning Supported Sit;Standing   Sit to Stand   Type of Assistance Needed Supervision   Sit to Stand CARE Score 4   Bed-Chair Transfer   Type of Assistance Needed Supervision   Chair/Bed-to-Chair Transfer CARE Score 4   Exercise Tools   Hand Gripper 5# digiflex 2 sets 15 B hands   Other Exercise Tool 1 yellow flex therabar 2 sets 15 up and down with BUE   Other Exercise Tool 2 knots out of tubing with BUE   Cognition   Orientation Level Oriented X4   Other Comments   Assessment Pt participates in 35 minutes concurrent treatment focusing on BUE therex/ theract and self care tasks with similar goals asa nother patient to increase stregnth/ ROM and acitvity tolerance   Assessment   Treatment Assessment Pt presents seated in w/c agreeable to OT session including ADLs, transfers/ mobility, and BUE therex/ theract. Pt tolerates session without complaints and is progressing towards goals. Pt requires assist  and supervision due to decreased balance,safety, endurance and strength/ ROM with BLE edema. pt will benefit from continued skilled OT services to increase independence with daily tasks.   Problem List Decreased strength;Decreased range of motion;Decreased endurance;Impaired balance;Decreased safety awareness;Decreased skin integrity;Pain   Plan   Treatment/Interventions ADL retraining;Functional transfer training;Therapeutic exercise;Endurance training;Equipment eval/education;Patient/family training;Compensatory technique education   Progress Progressing toward goals   OT Therapy Minutes   OT Time In 1030   OT Time Out 1200   OT Total Time (minutes) 90   OT Mode of treatment - Individual (minutes) 55   OT Mode of treatment - Concurrent (minutes) 35   Therapy Time missed   Time missed?  No